# Patient Record
Sex: MALE | Race: WHITE | NOT HISPANIC OR LATINO | Employment: FULL TIME | URBAN - METROPOLITAN AREA
[De-identification: names, ages, dates, MRNs, and addresses within clinical notes are randomized per-mention and may not be internally consistent; named-entity substitution may affect disease eponyms.]

---

## 2018-05-28 ENCOUNTER — HOSPITAL ENCOUNTER (EMERGENCY)
Facility: HOSPITAL | Age: 33
Discharge: HOME | End: 2018-05-28
Attending: EMERGENCY MEDICINE | Admitting: EMERGENCY MEDICINE
Payer: COMMERCIAL

## 2018-05-28 VITALS
SYSTOLIC BLOOD PRESSURE: 119 MMHG | WEIGHT: 185 LBS | TEMPERATURE: 97.9 F | BODY MASS INDEX: 23 KG/M2 | DIASTOLIC BLOOD PRESSURE: 59 MMHG | RESPIRATION RATE: 18 BRPM | OXYGEN SATURATION: 99 % | HEIGHT: 75 IN | HEART RATE: 114 BPM

## 2018-05-28 DIAGNOSIS — R44.0 AUDITORY HALLUCINATIONS: Primary | ICD-10-CM

## 2018-05-28 PROCEDURE — 99284 EMERGENCY DEPT VISIT MOD MDM: CPT

## 2018-05-28 NOTE — ED NOTES
Pt changed into paper scrubs per protocol  Pt's belongings inventoried and placed in bags in locker number 2  Pt given blankets for comfort measures  Pt calm and cooperative  Constant watch observation initiated       Marielena Jackson  05/28/18 3808

## 2018-05-28 NOTE — ED PROVIDER NOTES
History  Chief Complaint   Patient presents with    Psychiatric Evaluation     Patient states"he wants to voluntarily admit himself"  Patient states"he is having auditory hallucinations"  History provided by:  Patient  Psychiatric Evaluation   Presenting symptoms: hallucinations    Presenting symptoms: no aggressive behavior, no agitation, no delusions, no depression, no disorganized speech, no disorganized thought process, no self-mutilation, no suicidal thoughts, no suicidal threats and no suicide attempt    Patient accompanied by: family member  Degree of incapacity (severity):  Mild  Onset quality:  Gradual  Duration:  10 weeks  Timing:  Constant  Progression:  Waxing and waning  Chronicity:  New  Context: drug abuse (prior methamphetamine use, just got out of [facility] for 40 days and has not used)    Context: not alcohol use, not noncompliant and not recent medication change    Treatment compliance:  Untreated  Relieved by:  Nothing  Worsened by:  Drugs  Ineffective treatments:  None tried  Associated symptoms: irritability    Associated symptoms: no abdominal pain, no anxiety, no appetite change, no chest pain, not distractible, no euphoric mood and no fatigue    Risk factors: no family hx of mental illness, no hx of mental illness, no hx of suicide attempts and no recent psychiatric admission        None       Past Medical History:   Diagnosis Date    Drug abuse     ETOH abuse        History reviewed  No pertinent surgical history  History reviewed  No pertinent family history  I have reviewed and agree with the history as documented  Social History   Substance Use Topics    Smoking status: Current Every Day Smoker    Smokeless tobacco: Current User    Alcohol use Yes        Review of Systems   Constitutional: Positive for irritability  Negative for appetite change and fatigue  Cardiovascular: Negative for chest pain  Gastrointestinal: Negative for abdominal pain  Psychiatric/Behavioral: Positive for hallucinations  Negative for agitation, self-injury and suicidal ideas  The patient is not nervous/anxious  All other systems reviewed and are negative  Physical Exam  Physical Exam   Constitutional: He appears well-developed and well-nourished  No distress  HENT:   Head: Normocephalic  Eyes: EOM are normal  Pupils are equal, round, and reactive to light  Neck: Neck supple  Cardiovascular: Normal rate and regular rhythm  Pulmonary/Chest: Effort normal and breath sounds normal    Abdominal: Soft  Bowel sounds are normal    Musculoskeletal: Normal range of motion  He exhibits no edema  Neurological: He is alert  No cranial nerve deficit  He exhibits normal muscle tone  Skin: Skin is warm  Capillary refill takes less than 2 seconds  He is not diaphoretic  Psychiatric: His mood appears not anxious  His speech is not slurred  He is not agitated and not actively hallucinating (no active hallucinations at present)  He does not express impulsivity or inappropriate judgment  He does not exhibit a depressed mood  He expresses no homicidal and no suicidal ideation  He expresses no suicidal plans and no homicidal plans  Vitals reviewed        Vital Signs  ED Triage Vitals   Temperature Pulse Respirations Blood Pressure SpO2   05/28/18 1815 05/28/18 1815 05/28/18 1815 05/28/18 1815 05/28/18 1815   97 9 °F (36 6 °C) 99 20 118/71 99 %      Temp Source Heart Rate Source Patient Position - Orthostatic VS BP Location FiO2 (%)   05/28/18 1815 05/28/18 1815 05/28/18 1815 05/28/18 1815 --   Oral Monitor Sitting Left arm       Pain Score       05/28/18 1830       No Pain           Vitals:    05/28/18 1815 05/28/18 1830   BP: 118/71 119/59   Pulse: 99 (!) 114   Patient Position - Orthostatic VS: Sitting Sitting       Visual Acuity      ED Medications  Medications - No data to display    Diagnostic Studies  Results Reviewed     None                 No orders to display Procedures  Procedures       Phone Contacts  ED Phone Contact    ED Course                               MDM  Number of Diagnoses or Management Options  Auditory hallucinations: new and requires workup     Amount and/or Complexity of Data Reviewed  Discuss the patient with other providers: yes (C/w crisis)      CritCare Time    Disposition  Final diagnoses: Auditory hallucinations     Time reflects when diagnosis was documented in both MDM as applicable and the Disposition within this note     Time User Action Codes Description Comment    5/28/2018  8:23 PM Maria Esther MALLOY Add [R44 0] Auditory hallucinations       ED Disposition     ED Disposition Condition Comment    Discharge  Bhavani Jena discharge to home/self care  Condition at discharge: Good        MD Documentation      Most Recent Value   Sending MD Dr Melinda Lockwood up With Specialties Details Why Contact Info Additional Information    [address] [facility] Emergency Department Emergency Medicine   [address] [facility] [address] [facility] ED, [address] [place] [address], [id number]          Patient's Medications    No medications on file     No discharge procedures on file      ED Provider  Electronically Signed by           Osman Keller MD  05/28/18 5003

## 2018-05-28 NOTE — ED NOTES
Patients belongings:   [address] [facility]  Following items sent to security per request:  $[phone]  [address] [place] [address] with ID  Notebook  2x credit cards         Marielena Jackson  05/28/18 3212

## 2018-05-28 NOTE — ED NOTES
Patient is seated in bed and appears to be talking to friend  Patient does not appear to be showing any signs of distress  Will continue to monitor       Malou Sheets  05/28/18 1958

## 2018-05-29 NOTE — DISCHARGE INSTRUCTIONS
Hallucinations   WHAT YOU NEED TO KNOW:   Hallucinations are things you see, hear, feel, taste, or smell that seem real but are not  Some hallucinations are temporary  Hallucinations that continue, interfere with daily activities, or worsen may be a sign of a serious medical or mental condition that needs treatment  DISCHARGE INSTRUCTIONS:   Call 911 if you or someone else notices any of the following:   · You want to harm yourself or someone else  · You hear voices telling you to harm yourself or someone else  · You have a seizure  · You are confused, do not know where you are, or are not making sense when you speak  Return to the emergency department if:   · Your hallucinations get worse  · You vomit several times in a row  · Your heartbeat or breathing is faster or slower than usual      · You have trouble breathing or shortness of breath  Contact your healthcare provider if:   · You have new hallucinations  · You have questions or concerns about your condition or care  Medicines:   · Medicines  may be given to stop the hallucinations, reduce anxiety, or relax your muscles  · Take your medicine as directed  Contact your healthcare provider if you think your medicine is not helping or if you have side effects  Tell him or her if you are allergic to any medicine  Keep a list of the medicines, vitamins, and herbs you take  Include the amounts, and when and why you take them  Bring the list or the pill bottles to follow-up visits  Carry your medicine list with you in case of an emergency  Follow up with your healthcare provider as directed:  Write down your questions so you remember to ask them during your visits  © 2017 [facility] Information is for End User's use only and may not be sold, redistributed or otherwise used for commercial purposes   All illustrations and images included in CareNotes® are the copyrighted property of A D A M , Inc  or [employer]  The above information is an  only  It is not intended as medical advice for individual conditions or treatments  Talk to your doctor, nurse or pharmacist before following any medical regimen to see if it is safe and effective for you
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
## 2018-05-31 LAB
ALBUMIN SERPL BCP-MCNC: 4.1 G/DL (ref 3.5–5.7)
ALP SERPL-CCNC: 72 IU/L (ref 40–150)
ALT SERPL W P-5'-P-CCNC: 17 IU/L (ref 0–50)
AMPHETAMINES (HISTORICAL): NEGATIVE
ANION GAP SERPL CALCULATED.3IONS-SCNC: 11.5 MM/L
AST SERPL W P-5'-P-CCNC: 20 U/L (ref 8–27)
BARBITURATES (HISTORICAL): NEGATIVE
BASOPHILS # BLD AUTO: 0 X3/UL (ref 0–0.3)
BASOPHILS # BLD AUTO: 0.6 % (ref 0–2)
BENZODIAZEPINES (HISTORICAL): NEGATIVE
BILIRUB SERPL-MCNC: 0.7 MG/DL (ref 0.3–1)
BILIRUB UR QL STRIP: NEGATIVE
BUN SERPL-MCNC: 13 MG/DL (ref 7–25)
CALCIUM SERPL-MCNC: 9.2 MG/DL (ref 8.6–10.5)
CANNABINOIDS (HISTORICAL): NEGATIVE
CHLORIDE SERPL-SCNC: 105 MM/L (ref 98–107)
CLARITY UR: CLEAR
CO2 SERPL-SCNC: 27 MM/L (ref 21–31)
COCAINE (HISTORICAL): NEGATIVE
COLOR UR: YELLOW
CREAT SERPL-MCNC: 0.91 MG/DL (ref 0.7–1.3)
DEPRECATED RDW RBC AUTO: 12.4 % (ref 11.5–14.5)
EGFR (HISTORICAL): > 60 GFR
EGFR AFRICAN AMERICAN (HISTORICAL): > 60 GFR
EOSINOPHIL # BLD AUTO: 0.1 X3/UL (ref 0–0.5)
EOSINOPHIL NFR BLD AUTO: 2.3 % (ref 0–5)
ETHANOL (HISTORICAL): < 10 MG/DL
GLUCOSE (HISTORICAL): 112 MG/DL (ref 65–99)
GLUCOSE UR STRIP-MCNC: NEGATIVE MG/DL
HCT VFR BLD AUTO: 40.7 % (ref 42–52)
HGB BLD-MCNC: 14.1 G/DL (ref 14–18)
HGB UR QL STRIP.AUTO: NEGATIVE
KETONES UR STRIP-MCNC: NEGATIVE MG/DL
LEUKOCYTE ESTERASE UR QL STRIP: NEGATIVE
LYMPHOCYTES # BLD AUTO: 2.3 X3/UL (ref 1.2–4.2)
LYMPHOCYTES NFR BLD AUTO: 37.5 % (ref 20.5–51.1)
MCH RBC QN AUTO: 30.4 PG (ref 26–34)
MCHC RBC AUTO-ENTMCNC: 34.7 G/DL (ref 31–36)
MCV RBC AUTO: 87.8 FL (ref 81–99)
MEDICAL ALCOHOL (HISTORICAL): 0 %
METHADONE (HISTORICAL): NEGATIVE
MONOCYTES # BLD AUTO: 0.5 X3/UL (ref 0–1)
MONOCYTES NFR BLD AUTO: 7.9 % (ref 1.7–12)
NEUTROPHILS # BLD AUTO: 3.1 X3/UL (ref 1.4–6.5)
NEUTS SEG NFR BLD AUTO: 51.7 % (ref 42.2–75.2)
NITRITE UR QL STRIP: NEGATIVE
OPIATES (HISTORICAL): NEGATIVE
OSMOLALITY, SERUM (HISTORICAL): 280 MOSM (ref 262–291)
OXYCODONE (HISTORICAL): NEGATIVE
PH UR STRIP.AUTO: 6.5 [PH] (ref 4.5–8)
PHENCYCLIDINE URINE (HISTORICAL): NEGATIVE
PLATELET # BLD AUTO: 221 X3/UL (ref 130–400)
PLEASE NOTE (HISTORICAL): NORMAL
PMV BLD AUTO: 8.2 FL (ref 8.6–11.7)
POTASSIUM SERPL-SCNC: 3.5 MM/L (ref 3.5–5.5)
PROPOXYPHENE (HISTORICAL): NEGATIVE
PROT UR STRIP-MCNC: NEGATIVE MG/DL
RBC # BLD AUTO: 4.64 X6/UL (ref 4.3–5.9)
SODIUM SERPL-SCNC: 140 MM/L (ref 134–143)
SP GR UR STRIP.AUTO: 1.02 (ref 1–1.03)
TOTAL PROTEIN (HISTORICAL): 6.2 G/DL (ref 6.4–8.9)
TSH SERPL DL<=0.05 MIU/L-ACNC: 36.63 UIU/M (ref 0.45–5.33)
UROBILINOGEN UR QL STRIP.AUTO: 0.2 EU/DL (ref 0.2–8)
WBC # BLD AUTO: 6 X3/UL (ref 4.8–10.8)

## 2018-05-31 PROCEDURE — 36415 COLL VENOUS BLD VENIPUNCTURE: CPT

## 2018-05-31 PROCEDURE — 9990 DRUG SCREEN MULTIPLE CLASS (71121123)

## 2018-05-31 PROCEDURE — 9990 ASSAY OF ETHANOL (71120794)

## 2018-05-31 PROCEDURE — 99285 EMERGENCY DEPT VISIT HI MDM: CPT

## 2018-05-31 PROCEDURE — 80053 COMPREHEN METABOLIC PANEL: CPT

## 2018-05-31 PROCEDURE — 80307 DRUG TEST PRSMV CHEM ANLYZR: CPT

## 2018-05-31 PROCEDURE — 9990 VENIPUNCTURE-ER (9010166)

## 2018-05-31 PROCEDURE — 9990 VENIPUNCTURE (14170013)

## 2018-05-31 PROCEDURE — 9990 CBC AUTO & AUTO DIFF (71200000)

## 2018-05-31 PROCEDURE — 81003 URINALYSIS AUTO W/O SCOPE: CPT

## 2018-05-31 PROCEDURE — 9990 URINALYSIS AUTO W/O MICRO (71400121)

## 2018-05-31 PROCEDURE — 9990 BACTERIAL CULTURE OTHER SOURCE (71700058)

## 2018-05-31 PROCEDURE — 85025 COMPLETE CBC W/AUTO DIFF WBC: CPT

## 2018-05-31 PROCEDURE — 9990 TSH (71100234)

## 2018-05-31 PROCEDURE — 87070 CULTURE OTHR SPECIMN AEROBIC: CPT

## 2018-05-31 PROCEDURE — 9990 EM LEVEL V (9020181)

## 2018-05-31 PROCEDURE — 9990 COMPREHEN METABOLIC PANEL (71120349)

## 2018-05-31 PROCEDURE — 84443 ASSAY THYROID STIM HORMONE: CPT

## 2018-06-01 ENCOUNTER — HOSPITAL ENCOUNTER (INPATIENT)
Dept: OTHER | Facility: HOSPITAL | Age: 33
LOS: 68 days | Discharge: HOME | DRG: 750 | End: 2018-08-08
Attending: PSYCHIATRY & NEUROLOGY | Admitting: PSYCHIATRY & NEUROLOGY
Payer: COMMERCIAL

## 2018-06-01 PROCEDURE — 9990 ADULT BEHAVIOR UNIT GEN SUPPLY (7900053)

## 2018-06-01 PROCEDURE — 9990

## 2018-06-01 PROCEDURE — 9990 ADULT BEHAVOR UNIT (100305)

## 2018-06-01 PROCEDURE — 9990 NICOTINE 21 MG/24H (27022359)

## 2018-06-02 LAB — TSH SERPL DL<=0.05 MIU/L-ACNC: 12.84 UIU/M (ref 0.45–5.33)

## 2018-06-02 PROCEDURE — 9990 VENIPUNCTURE (14170013)

## 2018-06-02 PROCEDURE — 9990 TSH (71100234)

## 2018-06-02 PROCEDURE — 9990 ADULT BEHAVIOR UNIT GEN SUPPLY (7900053)

## 2018-06-02 PROCEDURE — 9990 NICOTINE 21 MG/24H (27022359)

## 2018-06-02 PROCEDURE — 9990 ADULT BEHAVOR UNIT (100305)

## 2018-06-02 PROCEDURE — 36415 COLL VENOUS BLD VENIPUNCTURE: CPT

## 2018-06-02 PROCEDURE — 84443 ASSAY THYROID STIM HORMONE: CPT

## 2018-06-02 PROCEDURE — 9990

## 2018-06-03 PROCEDURE — 9990 ADULT BEHAVOR UNIT (100305)

## 2018-06-03 PROCEDURE — 9990 ADULT BEHAVIOR UNIT GEN SUPPLY (7900053)

## 2018-06-03 PROCEDURE — 9990 NICOTINE 21 MG/24H (27022359)

## 2018-06-03 PROCEDURE — 9990

## 2018-06-04 PROCEDURE — 9990 ADULT BEHAVIOR UNIT GEN SUPPLY (7900053)

## 2018-06-04 PROCEDURE — 9990 NICOTINE 21 MG/24H (27022359)

## 2018-06-04 PROCEDURE — 9990

## 2018-06-04 PROCEDURE — 9990 ADULT BEHAVOR UNIT (100305)

## 2018-06-05 PROCEDURE — 9990 NICOTINE 21 MG/24H (27022359)

## 2018-06-05 PROCEDURE — 9990 ADULT BEHAVIOR UNIT GEN SUPPLY (7900053)

## 2018-06-05 PROCEDURE — 9990

## 2018-06-05 PROCEDURE — 9990 ADULT BEHAVOR UNIT (100305)

## 2018-06-06 PROCEDURE — 9990

## 2018-06-06 PROCEDURE — 9990 ADULT BEHAVIOR UNIT GEN SUPPLY (7900053)

## 2018-06-06 PROCEDURE — 9990 HALOPERIDOL TAB 5 MG (27102102)

## 2018-06-06 PROCEDURE — 9990 NICOTINE 21 MG/24H (27022359)

## 2018-06-06 PROCEDURE — 9990 ADULT BEHAVOR UNIT (100305)

## 2018-06-06 PROCEDURE — 9990 LORAZEPAM TAB 1 MG (27102144)

## 2018-06-07 PROCEDURE — 9990 NICOTINE 21 MG/24H (27022359)

## 2018-06-07 PROCEDURE — 9990

## 2018-06-07 PROCEDURE — 9990 ADULT BEHAVOR UNIT (100305)

## 2018-06-07 PROCEDURE — 9990 ADULT BEHAVIOR UNIT GEN SUPPLY (7900053)

## 2018-06-08 PROCEDURE — 9990

## 2018-06-08 PROCEDURE — 9990 NICOTINE 21 MG/24H (27022359)

## 2018-06-08 PROCEDURE — 9990 ADULT BEHAVOR UNIT (100305)

## 2018-06-08 PROCEDURE — 9990 ADULT BEHAVIOR UNIT GEN SUPPLY (7900053)

## 2018-06-09 PROCEDURE — 9990

## 2018-06-09 PROCEDURE — 9990 ADULT BEHAVIOR UNIT GEN SUPPLY (7900053)

## 2018-06-09 PROCEDURE — 9990 ADULT BEHAVOR UNIT (100305)

## 2018-06-09 PROCEDURE — 9990 NICOTINE 21 MG/24H (27022359)

## 2018-06-10 PROCEDURE — 9990

## 2018-06-10 PROCEDURE — 9990 NICOTINE 21 MG/24H (27022359)

## 2018-06-10 PROCEDURE — 9990 ADULT BEHAVOR UNIT (100305)

## 2018-06-10 PROCEDURE — 9990 ADULT BEHAVIOR UNIT GEN SUPPLY (7900053)

## 2018-06-11 PROCEDURE — 9990 ADULT BEHAVOR UNIT (100305)

## 2018-06-11 PROCEDURE — 9990

## 2018-06-11 PROCEDURE — 9990 NICOTINE 21 MG/24H (27022359)

## 2018-06-11 PROCEDURE — 9990 ADULT BEHAVIOR UNIT GEN SUPPLY (7900053)

## 2018-06-12 PROCEDURE — 9990 HALOPERIDOL TAB 5 MG (27102102)

## 2018-06-12 PROCEDURE — 9990 BENZTROPINE 1 MG (27101039)

## 2018-06-12 PROCEDURE — 9990 ADULT BEHAVOR UNIT (100305)

## 2018-06-12 PROCEDURE — 9990 ADULT BEHAVIOR UNIT GEN SUPPLY (7900053)

## 2018-06-12 PROCEDURE — 9990

## 2018-06-12 PROCEDURE — 9990 NICOTINE 21 MG/24H (27022359)

## 2018-06-13 PROCEDURE — 9990 ADULT BEHAVIOR UNIT GEN SUPPLY (7900053)

## 2018-06-13 PROCEDURE — 9990 NICOTINE 21 MG/24H (27022359)

## 2018-06-13 PROCEDURE — 9990 ADULT BEHAVOR UNIT (100305)

## 2018-06-13 PROCEDURE — 9990 BENZTROPINE 1 MG (27101039)

## 2018-06-13 PROCEDURE — 9990 HALOPERIDOL TAB 5 MG (27102102)

## 2018-06-14 PROCEDURE — 9990 ADULT BEHAVIOR UNIT GEN SUPPLY (7900053)

## 2018-06-14 PROCEDURE — 9990 ADULT BEHAVOR UNIT (100305)

## 2018-06-14 PROCEDURE — 9990 BENZTROPINE 1 MG (27101039)

## 2018-06-14 PROCEDURE — 9990 NICOTINE 21 MG/24H (27022359)

## 2018-06-14 PROCEDURE — 9990 HALOPERIDOL TAB 5 MG (27102102)

## 2018-06-15 PROCEDURE — 9990 ADULT BEHAVIOR UNIT GEN SUPPLY (7900053)

## 2018-06-15 PROCEDURE — 9990 ADULT BEHAVOR UNIT (100305)

## 2018-06-15 PROCEDURE — 9990 BENZTROPINE 1 MG (27101039)

## 2018-06-15 PROCEDURE — 9990 NICOTINE 21 MG/24H (27022359)

## 2018-06-15 PROCEDURE — 9990 HALOPERIDOL TAB 5 MG (27102102)

## 2018-06-16 PROCEDURE — 9990 HALOPERIDOL TAB 5 MG (27102102)

## 2018-06-16 PROCEDURE — 9990 NICOTINE 21 MG/24H (27022359)

## 2018-06-16 PROCEDURE — 9990 ADULT BEHAVIOR UNIT GEN SUPPLY (7900053)

## 2018-06-16 PROCEDURE — 9990 ADULT BEHAVOR UNIT (100305)

## 2018-06-16 PROCEDURE — 9990

## 2018-06-16 PROCEDURE — 9990 BENZTROPINE 1 MG (27101039)

## 2018-06-17 PROCEDURE — 9990 NICOTINE 21 MG/24H (27022359)

## 2018-06-17 PROCEDURE — 9990 BENZTROPINE 1 MG (27101039)

## 2018-06-17 PROCEDURE — 9990

## 2018-06-17 PROCEDURE — 9990 ADULT BEHAVOR UNIT (100305)

## 2018-06-17 PROCEDURE — 9990 HALOPERIDOL TAB 5 MG (27102102)

## 2018-06-17 PROCEDURE — 9990 ADULT BEHAVIOR UNIT GEN SUPPLY (7900053)

## 2018-06-18 DIAGNOSIS — F25.0 SCHIZOAFFECTIVE DISORDER, BIPOLAR TYPE (HCC): Chronic | ICD-10-CM

## 2018-06-18 DIAGNOSIS — E03.9 HYPOTHYROIDISM, UNSPECIFIED TYPE: Primary | ICD-10-CM

## 2018-06-18 LAB — TSH SERPL DL<=0.05 MIU/L-ACNC: 36.55 UIU/M (ref 0.45–5.33)

## 2018-06-18 PROCEDURE — 84443 ASSAY THYROID STIM HORMONE: CPT

## 2018-06-18 PROCEDURE — 9990 NICOTINE 21 MG/24H (27022359)

## 2018-06-18 PROCEDURE — 80048 BASIC METABOLIC PNL TOTAL CA: CPT

## 2018-06-18 PROCEDURE — 9990 TSH (71100234)

## 2018-06-18 PROCEDURE — 9990 VENIPUNCTURE (14170013)

## 2018-06-18 PROCEDURE — 80178 ASSAY OF LITHIUM: CPT

## 2018-06-18 PROCEDURE — 36415 COLL VENOUS BLD VENIPUNCTURE: CPT

## 2018-06-18 PROCEDURE — 9990 METABOLIC PANEL TOTAL CA (71120323)

## 2018-06-18 PROCEDURE — 9990

## 2018-06-18 PROCEDURE — 9990 HALOPERIDOL TAB 5 MG (27102102)

## 2018-06-18 PROCEDURE — 9990 ADULT BEHAVIOR UNIT GEN SUPPLY (7900053)

## 2018-06-18 PROCEDURE — 9990 BENZTROPINE 1 MG (27101039)

## 2018-06-18 PROCEDURE — 9990 CBC AUTO & AUTO DIFF (71200000)

## 2018-06-18 PROCEDURE — 9990 ADULT BEHAVOR UNIT (100305)

## 2018-06-18 PROCEDURE — 85025 COMPLETE CBC W/AUTO DIFF WBC: CPT

## 2018-06-19 LAB — COMMENT (HISTORICAL): NORMAL

## 2018-06-19 PROCEDURE — 9990 HALOPERIDOL TAB 5 MG (27102102)

## 2018-06-19 PROCEDURE — 9990 ADULT BEHAVIOR UNIT GEN SUPPLY (7900053)

## 2018-06-19 PROCEDURE — 9990 BENZTROPINE 1 MG (27101039)

## 2018-06-19 PROCEDURE — 9990

## 2018-06-19 PROCEDURE — 9990 NICOTINE 21 MG/24H (27022359)

## 2018-06-19 PROCEDURE — 9990 ADULT BEHAVOR UNIT (100305)

## 2018-06-19 PROCEDURE — 9990 OLANZAPINE 5 MG (27112317)

## 2018-06-20 PROCEDURE — 9990 BENZTROPINE 1 MG (27101039)

## 2018-06-20 PROCEDURE — 9990

## 2018-06-20 PROCEDURE — 9990 ADULT BEHAVIOR UNIT GEN SUPPLY (7900053)

## 2018-06-20 PROCEDURE — 9990 ADULT BEHAVOR UNIT (100305)

## 2018-06-20 PROCEDURE — 9990 OLANZAPINE 5 MG (27112317)

## 2018-06-20 PROCEDURE — 9990 NICOTINE 21 MG/24H (27022359)

## 2018-06-21 PROCEDURE — 9990

## 2018-06-21 PROCEDURE — 9990 HALOPERIDOL TAB 5 MG (27102102)

## 2018-06-21 PROCEDURE — 9990 ADULT BEHAVOR UNIT (100305)

## 2018-06-21 PROCEDURE — 80156 ASSAY CARBAMAZEPINE TOTAL: CPT

## 2018-06-21 PROCEDURE — 9990 BENZTROPINE 1 MG (27101039)

## 2018-06-21 PROCEDURE — 9990 ADULT BEHAVIOR UNIT GEN SUPPLY (7900053)

## 2018-06-21 PROCEDURE — 9990 NICOTINE 21 MG/24H (27022359)

## 2018-06-21 PROCEDURE — 82248 BILIRUBIN DIRECT: CPT

## 2018-06-21 PROCEDURE — 80053 COMPREHEN METABOLIC PANEL: CPT

## 2018-06-21 PROCEDURE — 9990 OLANZAPINE 5 MG (27112317)

## 2018-06-21 PROCEDURE — 9990 COMPREHEN METABOLIC PANEL (71120349)

## 2018-06-22 LAB
ALBUMIN SERPL BCP-MCNC: 4.3 G/DL (ref 3.5–5.7)
ALP SERPL-CCNC: 60 IU/L (ref 40–150)
ALT SERPL W P-5'-P-CCNC: 21 IU/L (ref 0–50)
ANION GAP SERPL CALCULATED.3IONS-SCNC: 14.2 MM/L
AST SERPL W P-5'-P-CCNC: 14 U/L (ref 8–27)
BILIRUB SERPL-MCNC: 0.4 MG/DL (ref 0.3–1)
BILIRUBIN DIRECT (HISTORICAL): 0 MG/DL (ref 0–0.2)
BUN SERPL-MCNC: 23 MG/DL (ref 7–25)
CALCIUM SERPL-MCNC: 9.7 MG/DL (ref 8.6–10.5)
CARBAMAZEPINE LEVEL (HISTORICAL): 2.4 MCQ/M (ref 4–12)
CHLORIDE SERPL-SCNC: 101 MM/L (ref 98–107)
CO2 SERPL-SCNC: 25 MM/L (ref 21–31)
CREAT SERPL-MCNC: 0.97 MG/DL (ref 0.7–1.3)
EGFR (HISTORICAL): > 60 GFR
EGFR AFRICAN AMERICAN (HISTORICAL): > 60 GFR
GLUCOSE (HISTORICAL): 92 MG/DL (ref 65–99)
OSMOLALITY, SERUM (HISTORICAL): 275 MOSM (ref 262–291)
POTASSIUM SERPL-SCNC: 4.2 MM/L (ref 3.5–5.5)
SODIUM SERPL-SCNC: 136 MM/L (ref 134–143)
TOTAL PROTEIN (HISTORICAL): 6.5 G/DL (ref 6.4–8.9)

## 2018-06-22 PROCEDURE — 9990 VENIPUNCTURE (14170013)

## 2018-06-22 PROCEDURE — 9990 ADULT BEHAVIOR UNIT GEN SUPPLY (7900053)

## 2018-06-22 PROCEDURE — 36415 COLL VENOUS BLD VENIPUNCTURE: CPT

## 2018-06-22 PROCEDURE — 9990 NICOTINE 21 MG/24H (27022359)

## 2018-06-22 PROCEDURE — 9990

## 2018-06-22 PROCEDURE — 9990 ADULT BEHAVOR UNIT (100305)

## 2018-06-22 PROCEDURE — 9990 OLANZAPINE 10 MG (27112341)

## 2018-06-22 PROCEDURE — 9990 BENZTROPINE 1 MG (27101039)

## 2018-06-22 RX ORDER — DIPHENHYDRAMINE HYDROCHLORIDE 50 MG/ML
50 INJECTION INTRAMUSCULAR; INTRAVENOUS EVERY 4 HOURS PRN
Status: DISCONTINUED | OUTPATIENT
Start: 2018-06-23 | End: 2018-08-08 | Stop reason: HOSPADM

## 2018-06-22 RX ORDER — OLANZAPINE 5 MG/1
5 TABLET ORAL DAILY
Status: DISCONTINUED | OUTPATIENT
Start: 2018-06-23 | End: 2018-06-22

## 2018-06-22 RX ORDER — ACETAMINOPHEN 325 MG/1
650 TABLET ORAL EVERY 4 HOURS PRN
Status: DISCONTINUED | OUTPATIENT
Start: 2018-06-23 | End: 2018-08-08 | Stop reason: HOSPADM

## 2018-06-22 RX ORDER — CHLORPROMAZINE HYDROCHLORIDE 25 MG/ML
25 INJECTION INTRAMUSCULAR EVERY 6 HOURS PRN
Status: DISCONTINUED | OUTPATIENT
Start: 2018-06-23 | End: 2018-08-08 | Stop reason: HOSPADM

## 2018-06-22 RX ORDER — DIPHENHYDRAMINE HCL 25 MG
50 TABLET ORAL EVERY 4 HOURS PRN
Status: DISCONTINUED | OUTPATIENT
Start: 2018-06-23 | End: 2018-08-08 | Stop reason: HOSPADM

## 2018-06-22 RX ORDER — CHLORPROMAZINE HYDROCHLORIDE 25 MG/1
25 TABLET, FILM COATED ORAL EVERY 6 HOURS PRN
Status: DISCONTINUED | OUTPATIENT
Start: 2018-06-23 | End: 2018-08-08 | Stop reason: HOSPADM

## 2018-06-22 RX ORDER — PALIPERIDONE 3 MG/1
3 TABLET, EXTENDED RELEASE ORAL DAILY
Status: DISCONTINUED | OUTPATIENT
Start: 2018-06-23 | End: 2018-06-26

## 2018-06-22 RX ORDER — CARBAMAZEPINE 200 MG/1
200 TABLET ORAL 2 TIMES DAILY
Status: DISCONTINUED | OUTPATIENT
Start: 2018-06-23 | End: 2018-07-01

## 2018-06-22 RX ORDER — TRAZODONE HYDROCHLORIDE 50 MG/1
50 TABLET ORAL
Status: DISCONTINUED | OUTPATIENT
Start: 2018-06-23 | End: 2018-08-08 | Stop reason: HOSPADM

## 2018-06-22 RX ORDER — MAGNESIUM HYDROXIDE/ALUMINUM HYDROXICE/SIMETHICONE 120; 1200; 1200 MG/30ML; MG/30ML; MG/30ML
30 SUSPENSION ORAL EVERY 4 HOURS PRN
Status: DISCONTINUED | OUTPATIENT
Start: 2018-06-23 | End: 2018-08-08 | Stop reason: HOSPADM

## 2018-06-22 RX ORDER — BENZTROPINE MESYLATE 1 MG/1
1 TABLET ORAL 2 TIMES DAILY
Status: DISCONTINUED | OUTPATIENT
Start: 2018-06-23 | End: 2018-08-08 | Stop reason: HOSPADM

## 2018-06-22 RX ORDER — OLANZAPINE 10 MG/1
10 TABLET ORAL DAILY
Status: DISCONTINUED | OUTPATIENT
Start: 2018-06-23 | End: 2018-06-22

## 2018-06-22 RX ORDER — NICOTINE 21 MG/24HR
21 PATCH, TRANSDERMAL 24 HOURS TRANSDERMAL DAILY
Status: DISCONTINUED | OUTPATIENT
Start: 2018-06-23 | End: 2018-08-08 | Stop reason: HOSPADM

## 2018-06-22 RX ORDER — LEVOTHYROXINE SODIUM 0.03 MG/1
50 TABLET ORAL
Status: DISCONTINUED | OUTPATIENT
Start: 2018-06-23 | End: 2018-08-08 | Stop reason: HOSPADM

## 2018-06-23 RX ADMIN — CARBAMAZEPINE 200 MG: 200 TABLET ORAL at 21:43

## 2018-06-23 RX ADMIN — CHLORPROMAZINE HYDROCHLORIDE 25 MG: 25 TABLET, SUGAR COATED ORAL at 21:42

## 2018-06-23 RX ADMIN — BENZTROPINE MESYLATE 1 MG: 1 TABLET ORAL at 09:26

## 2018-06-23 RX ADMIN — PALIPERIDONE 3 MG: 3 TABLET, EXTENDED RELEASE ORAL at 09:27

## 2018-06-23 RX ADMIN — BENZTROPINE MESYLATE 1 MG: 1 TABLET ORAL at 21:42

## 2018-06-23 RX ADMIN — CARBAMAZEPINE 200 MG: 200 TABLET ORAL at 09:27

## 2018-06-23 RX ADMIN — LEVOTHYROXINE SODIUM 50 MCG: 25 TABLET ORAL at 05:55

## 2018-06-23 NOTE — NURSING NOTE
Pt  Has been isolated to room for most of day  Compliant with meals and meds  Continues to have auditory hallucinations but has not acted on them  Denies SI, HI  Minimally social with peers  Cooperative

## 2018-06-24 PROBLEM — F39 UNSPECIFIED MOOD (AFFECTIVE) DISORDER (HCC): Status: ACTIVE | Noted: 2018-06-24

## 2018-06-24 PROBLEM — F15.10 AMPHETAMINE ABUSE (HCC): Status: ACTIVE | Noted: 2018-06-24

## 2018-06-24 PROBLEM — F15.951 AMPHETAMINE AND PSYCHOSTIMULANT-INDUCED PSYCHOSIS WITH HALLUCINATIONS (HCC): Status: ACTIVE | Noted: 2018-06-24

## 2018-06-24 RX ORDER — PALIPERIDONE 3 MG/1
3 TABLET, EXTENDED RELEASE ORAL DAILY
Status: COMPLETED | OUTPATIENT
Start: 2018-06-24 | End: 2018-06-24

## 2018-06-24 RX ADMIN — CARBAMAZEPINE 200 MG: 200 TABLET ORAL at 10:29

## 2018-06-24 RX ADMIN — CARBAMAZEPINE 200 MG: 200 TABLET ORAL at 21:58

## 2018-06-24 RX ADMIN — LEVOTHYROXINE SODIUM 50 MCG: 25 TABLET ORAL at 06:03

## 2018-06-24 RX ADMIN — NICOTINE 21 MG: 21 PATCH, EXTENDED RELEASE TRANSDERMAL at 10:31

## 2018-06-24 RX ADMIN — DIPHENHYDRAMINE HCL 50 MG: 25 TABLET ORAL at 13:48

## 2018-06-24 RX ADMIN — PALIPERIDONE 3 MG: 3 TABLET, EXTENDED RELEASE ORAL at 14:49

## 2018-06-24 RX ADMIN — BENZTROPINE MESYLATE 1 MG: 1 TABLET ORAL at 10:29

## 2018-06-24 RX ADMIN — PALIPERIDONE 3 MG: 3 TABLET, EXTENDED RELEASE ORAL at 10:29

## 2018-06-24 RX ADMIN — CHLORPROMAZINE HYDROCHLORIDE 25 MG: 25 TABLET, SUGAR COATED ORAL at 21:59

## 2018-06-24 RX ADMIN — BENZTROPINE MESYLATE 1 MG: 1 TABLET ORAL at 21:59

## 2018-06-24 RX ADMIN — ACETAMINOPHEN 650 MG: 325 TABLET ORAL at 13:47

## 2018-06-24 NOTE — PLAN OF CARE
Alteration in Thoughts and Perception     Treatment Goal: Gain control of psychotic behaviors/thinking, reduce/eliminate presenting symptoms and demonstrate improved reality functioning upon discharge Progressing     Verbalize thoughts and feelings Progressing     Refrain from acting on delusional thinking/internal stimuli Progressing     Agree to be compliant with medication regime, as prescribed and report medication side effects Progressing     Attend and participate in unit activities, including therapeutic, recreational, and educational groups Progressing     Recognize dysfunctional thoughts, communicate reality-based thoughts at the time of discharge Progressing     Complete daily ADLs, including personal hygiene independently, as able Progressing        Anxiety     Anxiety is at manageable level Progressing        Individualized Interventions     Patient will verbalize need for hospitalization and will no longer attempt elopement within 5 days Progressing     Patient will recognize inappropriate behaviors and develop alternative behaviors within 5 days Progressing        Risk for Self Injury/Neglect     Treatment Goal: Remain safe during length of stay, learn and adopt new coping skills, and be free of self-injurious ideation, impulses and acts at the time of discharge Progressing     Verbalize thoughts and feelings Progressing     Refrain from harming self Progressing     Attend and participate in unit activities, including therapeutic, recreational, and educational groups Progressing     Recognize maladaptive responses and adopt new coping mechanisms Progressing     Complete daily ADLs, including personal hygiene independently, as able Progressing        Risk for Violence/Aggression Toward Others     Treatment Goal: Refrain from acts of violence/aggression during length of stay, and demonstrate improved impulse control at the time of discharge Progressing     Verbalize thoughts and feelings Progressing     Refrain from harming others Progressing     Refrain from destructive acts on the environment or property Progressing     Control angry outbursts Progressing     Attend and participate in unit activities, including therapeutic, recreational, and educational groups Progressing     Identify appropriate positive anger management techniques Progressing

## 2018-06-24 NOTE — NURSING NOTE
Jael Myers had a noted irritable edge during assessment  Stated that he was feeling "fine"  Denied active hallucinations, but appears internally preoccupied  No overt aggression noted  Maintained on Q 15 minute safety checks  No acting out, suicidal ideations, or homicidal behaviors  No change in medical condition or current complaints  Remains a fall risk  Fluids maintained at bedside to promote hydration

## 2018-06-24 NOTE — PROGRESS NOTES
Sebastian Peters 33 y o  Sex:male; [facility]#[id number]        History of Present Illness  This is a 15 minute progress note on Sebastian Peters, of which five minutes was spent in coordination of care, which consisted of meeting with the nursing staff to discuss the patient's progress, response to treatment, documentation, medication orders,and behaviors over the past 24 hours  Sebastian Peters was seen on the unit today  Chart reviewed  Nursing reports that the patient eats and sleeps well  The patient told me he feels much better today  He has been observed socializing appropriately with staff and peers  He denies any adverse side effects from using his medication  He said that he no longer has visual hallucinations  He still has auditory hallucinations         Mental Status Evaluation  Adult male  Not in acute distress  Casually dressed  Poor hygiene  Friendly on approach  Good eye contact  No psychomotor agitation or retardation  Speech: poverty of speech  Mood has improved  He is less anxious  Thought process: less tangential  Thought content: he denies suicidal or homicidal thoughts or plans  He reports hallucinations  Hehas been observed responding to internal stimuli  He has not been aggressive to self or others since he came to the unit  Poor attention and  Concentration  Poor short term and long term memory  Limited understanding of his mental illness  Compliant with his treatment plan      Diagnosis  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  Mood disorder unspecified  Tobacco use disorder      Assessment/ Plan    Tyler Davenport been using crystal meth prior to admission  A drug-induced psychosis resembling paranoid schizophrenia can occur with repeated high-dose use of methamphetamine in vulnerable individuals,  and neuroleptics may prevent the recurrence of further psychosis [place], 2006)   The patient has responded to treatment with medication and the therapeutic lieberman milieu  He reports that visual hallucinations have ceased, but auditory hallucinations persist  Consider Quetiapine  Continue current medication and treatment  Discharge planning and disposition is ongoing

## 2018-06-24 NOTE — PROGRESS NOTES
Gadiel Waite 33 y o  Sex:male; FKQ#[id number]           History of Present Illness  This is a 15 minute progress note on Gadiel Waite, of which five minutes was spent in coordination of care, which consisted of meeting with the nursing staff to discuss the patient's progress, response to treatment, documentation, medication orders,and behaviors over the past 24 hours  Gadiel Waite was seen on the unit today  Chart reviewed  Nursing reports that the patient eats and sleeps well  The patient told me he feels much better today  He has been observed socializing appropriately with staff and peers  Ashley Patel denies any adverse side effects from using his medication  He said that he no longer has visual hallucinations  He still has auditory hallucinations         Mental Status Evaluation  Adult male    Casually dressed  Poor hygiene  Friendly on approach  Good eye contact  No psychomotor agitation or retardation  Speech: poverty of speech  Mood has improved  He is less anxious  Thought process: less tangential  Thought content: he denies suicidal or homicidal thoughts or plans  He reports command auditory  hallucinations  Hehas been observed responding to internal stimuli  He has not been aggressive to self or others since he came to the unit  Poor attention and  Concentration  Poor short term and long term memory  Limited understanding of his mental illness  Compliant with his treatment plan      Diagnosis  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  Mood disorder unspecified  Tobacco use disorder      Assessment/ Plan    Day John been using crystal meth prior to admission  A drug-induced psychosis resembling paranoid schizophrenia can occur with repeated high-dose use of methamphetamine in vulnerable individuals,  and neuroleptics may prevent the recurrence of further psychosis [employer], 2006)   The patient has responded to treatment with medication and the therapeutic lieberman milieu  He reports that visual hallucinations have ceased, but auditory hallucinations persist  Increase Invega to 6mg daily- additional 3mg today    Continue current medication and treatment  Discharge planning and disposition is ongoing

## 2018-06-24 NOTE — NURSING NOTE
Pt reports feeling stressed out due to continued auditory hallucinations  Pt given verbal education meds and PRNs administered, which were affective

## 2018-06-25 RX ADMIN — BENZTROPINE MESYLATE 1 MG: 1 TABLET ORAL at 09:10

## 2018-06-25 RX ADMIN — PALIPERIDONE 3 MG: 3 TABLET, EXTENDED RELEASE ORAL at 09:10

## 2018-06-25 RX ADMIN — CARBAMAZEPINE 200 MG: 200 TABLET ORAL at 21:49

## 2018-06-25 RX ADMIN — LEVOTHYROXINE SODIUM 50 MCG: 25 TABLET ORAL at 05:25

## 2018-06-25 RX ADMIN — CARBAMAZEPINE 200 MG: 200 TABLET ORAL at 09:10

## 2018-06-25 RX ADMIN — BENZTROPINE MESYLATE 1 MG: 1 TABLET ORAL at 21:49

## 2018-06-25 NOTE — PLAN OF CARE
Alteration in Thoughts and Perception     Treatment Goal: Gain control of psychotic behaviors/thinking, reduce/eliminate presenting symptoms and demonstrate improved reality functioning upon discharge Progressing     Verbalize thoughts and feelings Progressing     Refrain from acting on delusional thinking/internal stimuli Progressing     Agree to be compliant with medication regime, as prescribed and report medication side effects Progressing     Attend and participate in unit activities, including therapeutic, recreational, and educational groups Progressing     Recognize dysfunctional thoughts, communicate reality-based thoughts at the time of discharge Progressing     Complete daily ADLs, including personal hygiene independently, as able Progressing        Anxiety     Anxiety is at manageable level Progressing        Individualized Interventions     Patient will verbalize need for hospitalization and will no longer attempt elopement within 5 days Progressing     Patient will recognize inappropriate behaviors and develop alternative behaviors within 5 days Progressing        Ineffective Coping     Participates in unit activities Progressing        Risk for Self Injury/Neglect     Treatment Goal: Remain safe during length of stay, learn and adopt new coping skills, and be free of self-injurious ideation, impulses and acts at the time of discharge Progressing     Verbalize thoughts and feelings Progressing     Refrain from harming self Progressing     Attend and participate in unit activities, including therapeutic, recreational, and educational groups Progressing     Recognize maladaptive responses and adopt new coping mechanisms Progressing     Complete daily ADLs, including personal hygiene independently, as able Progressing        Risk for Violence/Aggression Toward Others     Treatment Goal: Refrain from acts of violence/aggression during length of stay, and demonstrate improved impulse control at the time of discharge Progressing     Verbalize thoughts and feelings Progressing     Refrain from harming others Progressing     Refrain from destructive acts on the environment or property Progressing     Control angry outbursts Progressing     Attend and participate in unit activities, including therapeutic, recreational, and educational groups Progressing     Identify appropriate positive anger management techniques Progressing

## 2018-06-25 NOTE — NURSING NOTE
Withdrawn to room mostly  No acting out noted  Continues to have active auditory hallucinations  Maintained on Q 15 minute safety checks  No acting out, suicidal ideations, and/or homicidal behaviors  No changes in medical condition or current complaints noted  Remains a fall risk  Fluids maintained at bedside to promote hydration

## 2018-06-25 NOTE — PROGRESS NOTES
Sw met with patient  Patient reports; voices kept me up last night, I didn't get to sleep until 5am  Pt reports the voices aren't as bad today  Patient pleasant and cooperative with staff  Denies current SI/HI/VH  Encouraged pt to attend group therapy sessions

## 2018-06-25 NOTE — NURSING NOTE
Pt is flat and remains WD to self  Pt admits that he is still having AH  Pt stated that they are a little more controlled  Pt does not elaborate on what the voices are saying  No c/o pain  No S/S of distress at this time  Pt tends to his hygiene and ADL's  Pt is able to make his needs known  No issues with pt trying to elope at this time  No outbursts at this time  Pt has been compliant with meds  Pt cooperative with staff  Denies SI/HI at this time  Will continue Q 15 min checks to maintain pt safety

## 2018-06-25 NOTE — PROGRESS NOTES
Angela hart o  Sex:male; [facility]#[id number]           History of Present Illness  This is a 25 minute progress note on Grace Armstrong, of which fifteen minutes was spent in coordination of care, which consisted of meeting with the multidisciplinary treatment team to discuss the patient's progress, response to treatment, documentation, medication orders,and behaviors over the past 24 hours  Nadiya POMPA Tien was seen on the unit today  Chart reviewed  Nursing reports that the patient eats and sleeps well  The patient told me he feels 'okay' today  He has been observed socializing appropriately with staff and peers  Shaniqua Champion denies any adverse side effects from using his medication  He said that he no longer has visual hallucinations  He still has auditory hallucinations         Mental Status Evaluation  Adult male  Casually dressed  Poor hygiene  Cooperative  Good eye contact  No psychomotor agitation or retardation  Speech: poverty of speech  Mood has improved  Affect: restricted  Thought content: he denies suicidal or homicidal thoughts or plans  He reports command auditory  hallucinations  He has been observed responding to internal stimuli  He has not been aggressive to self or others since he came to the unit  Poor attention and  Concentration  Poor short term and long term memory  Limited understanding of his mental illness  Compliant with his treatment plan      Diagnosis  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

## 2018-06-25 NOTE — PROGRESS NOTES
SW received a phone call from pt's friend, Deepti Igor [phone]  Farrah Preciado stated pt has called her five times today about being discharged  Pt believes he is being discharged today  Farrah Preciado stated concerns for pt's mental health  When Farrah Preciado visits, she notices pt having agitation  Farrah Preciado stated she did not feel current medications are working    CASTRO will discuss concerns with treatment team

## 2018-06-26 LAB
ALBUMIN SERPL BCP-MCNC: 4.1 G/DL (ref 3.5–5.7)
ALP SERPL-CCNC: 52 U/L (ref 40–150)
ALT SERPL W P-5'-P-CCNC: 18 U/L (ref 7–52)
ANION GAP SERPL CALCULATED.3IONS-SCNC: 9 MMOL/L (ref 4–13)
AST SERPL W P-5'-P-CCNC: 12 U/L (ref 13–39)
BILIRUB DIRECT SERPL-MCNC: 0 MG/DL (ref 0–0.2)
BILIRUB SERPL-MCNC: 0.4 MG/DL (ref 0.2–1)
BUN SERPL-MCNC: 22 MG/DL (ref 7–25)
CALCIUM SERPL-MCNC: 9.3 MG/DL (ref 8.6–10.5)
CHLORIDE SERPL-SCNC: 101 MMOL/L (ref 98–107)
CO2 SERPL-SCNC: 27 MMOL/L (ref 21–31)
CREAT SERPL-MCNC: 0.86 MG/DL (ref 0.7–1.3)
GFR SERPL CREATININE-BSD FRML MDRD: 116 ML/MIN/1.73SQ M
GLUCOSE SERPL-MCNC: 90 MG/DL (ref 65–99)
POTASSIUM SERPL-SCNC: 3.8 MMOL/L (ref 3.5–5.5)
PROT SERPL-MCNC: 6.9 G/DL (ref 6.4–8.9)
SODIUM SERPL-SCNC: 137 MMOL/L (ref 134–143)

## 2018-06-26 PROCEDURE — 80053 COMPREHEN METABOLIC PANEL: CPT

## 2018-06-26 PROCEDURE — 82248 BILIRUBIN DIRECT: CPT

## 2018-06-26 RX ORDER — QUETIAPINE FUMARATE 25 MG/1
25 TABLET, FILM COATED ORAL 2 TIMES DAILY
Status: DISCONTINUED | OUTPATIENT
Start: 2018-06-26 | End: 2018-06-27

## 2018-06-26 RX ADMIN — QUETIAPINE FUMARATE 25 MG: 25 TABLET ORAL at 17:13

## 2018-06-26 RX ADMIN — CARBAMAZEPINE 200 MG: 200 TABLET ORAL at 20:55

## 2018-06-26 RX ADMIN — LEVOTHYROXINE SODIUM 50 MCG: 25 TABLET ORAL at 05:43

## 2018-06-26 RX ADMIN — CARBAMAZEPINE 200 MG: 200 TABLET ORAL at 09:08

## 2018-06-26 RX ADMIN — BENZTROPINE MESYLATE 1 MG: 1 TABLET ORAL at 09:07

## 2018-06-26 RX ADMIN — PALIPERIDONE 3 MG: 3 TABLET, EXTENDED RELEASE ORAL at 09:08

## 2018-06-26 RX ADMIN — BENZTROPINE MESYLATE 1 MG: 1 TABLET ORAL at 20:55

## 2018-06-26 RX ADMIN — QUETIAPINE FUMARATE 25 MG: 25 TABLET ORAL at 12:25

## 2018-06-26 RX ADMIN — CHLORPROMAZINE HYDROCHLORIDE 25 MG: 25 TABLET, SUGAR COATED ORAL at 21:23

## 2018-06-26 NOTE — NURSING NOTE
Pt remains flat and mostly WD to self  Pt is still internally preoccupied and having AH  Pt refused to have his Tegretol lab drawn because he stated "the voices are telling me not to "   Explained to the pt the importance of having the labs done  Pt stated the voices are telling me that "too much blood went out today "  Unable to draw the lab  Pt denies any VH at this time  Pt denies AH telling him to harm himself or others  Denies SI/HI at this time  Pt compliant with meds and meals  No exit seeking at this time  No c/o pain  No S/S of distress at this time  Will continue Q 15 min checks to maintain pt safety

## 2018-06-26 NOTE — NURSING NOTE
Spent the evening withdrawn to room and sleeping in bed  Pt denies changes in condition for the past few days  Continues to reports active auditory hallucinations which are "tolerable"  Also reports occasional visual hallucinations of people he recognizes but aren't really here  States he is coping by just "laughing it off"  Pt is calm and attentive during interaction  Appropriate responses to prompting  Poor insight  Denies SI or other acute concerns  Med compliant  Agreeable to making needs known   Will maintain sp1 and 15 minute checks for safety

## 2018-06-26 NOTE — PROGRESS NOTES
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)  Active Problems:    Unspecified mood (affective) disorder (HCC)    Amphetamine abuse    Behavior over the last 24 hours:  Patient secluded to room and sleeping intermittently per nursing report  He has not exhibited exit-seeking behaviors over the past 24 hours  Appetite good  During interview today, Anne-Marie Lynch was internally preoccupied and attests that he is hearing voices all of the time  He relates one of the voices is that of his brother who wants to "keep me here forever"  He states the voices are non-commanding but are insulting  He feels Mer[facility]bar[facility] Lee is not helping  He appears calm and offers no other complaints at this time  He rates his mood as "OK" and states he feels safe here  Sleep: normal  Appetite: normal  Medication side effects: No  ROS: no complaints    Mental Status Evaluation:  Appearance:  casually dressed and disheveled   Behavior:  restless and fidgety   Speech:  normal pitch, normal volume and tangential   Mood:  anxious   Affect:  mood-congruent   Thought Process:  circumstantial, disorganized, flight of ideas, illogical, loose associations and tangential   Thought Content:  paranoia toward family   Perceptual Disturbances: Auditory hallucinations without commands   Risk Potential: Pt  denies ISIAH   Sensorium:  person and place and time   Cognition:  grossly intact   Consciousness:  alert and awake    Attention: impaired   Insight:  limited   Judgment: limited   Gait/Station: normal gait/station   Motor Activity: no abnormal movements     Progress Toward Goals: Unimproved    Recommended Treatment: Continue with group therapy, milieu therapy and occupational therapy        Risks, benefits and possible side effects of Medications:   Risks, benefits, and possible side effects of medications explained to patient and patient verbalizes understanding  Medications: all current active meds have been reviewed  Labs: I have personally reviewed all laboratory/test results  Counseling / Coordination of Care  Medications and treatment plan reviewed with physician and treatment team, and per physician, treatment plan is as follows:  1  Will D/C Invega due to inefectiveness  2  Will initiate Seroquel 25mg PO BID for psychotic symptoms  3  Will continue to observe patient and re-adjust medications as needed  4  Will encourage patient to participate in groups in order to benefit from the therapeutic milieu as tolerated

## 2018-06-26 NOTE — PLAN OF CARE
PT has been coming to 45% of Art Therapy groups with much encouragement  Pt lacks motivation into participation in art therapy groups but has positive interactions when he participates  Pt is pleasant in social interactions  PT will continue to be encouraged to attend art therapy groups  Will continue to follow

## 2018-06-26 NOTE — SOCIAL WORK
SW attempted to meet with patient for clinical update  Patient was sleeping soundly at the times contact was attempted  Will return to evaluate at a later time

## 2018-06-27 RX ORDER — QUETIAPINE FUMARATE 25 MG/1
50 TABLET, FILM COATED ORAL 2 TIMES DAILY
Status: DISCONTINUED | OUTPATIENT
Start: 2018-06-27 | End: 2018-06-29

## 2018-06-27 RX ADMIN — CARBAMAZEPINE 200 MG: 200 TABLET ORAL at 08:33

## 2018-06-27 RX ADMIN — BENZTROPINE MESYLATE 1 MG: 1 TABLET ORAL at 21:05

## 2018-06-27 RX ADMIN — QUETIAPINE FUMARATE 50 MG: 25 TABLET ORAL at 17:41

## 2018-06-27 RX ADMIN — CHLORPROMAZINE HYDROCHLORIDE 25 MG: 25 TABLET, SUGAR COATED ORAL at 21:07

## 2018-06-27 RX ADMIN — BENZTROPINE MESYLATE 1 MG: 1 TABLET ORAL at 08:33

## 2018-06-27 RX ADMIN — LEVOTHYROXINE SODIUM 50 MCG: 25 TABLET ORAL at 06:49

## 2018-06-27 RX ADMIN — QUETIAPINE FUMARATE 25 MG: 25 TABLET ORAL at 08:33

## 2018-06-27 RX ADMIN — CARBAMAZEPINE 200 MG: 200 TABLET ORAL at 21:05

## 2018-06-27 NOTE — DISCHARGE INSTR - OTHER ORDERS
You are being discharged to     Triggers you have identified during your hospital that led to distressed mood and auditory hallucinations include: long history of substance abuse and a necessary medication adjustment  Coping skills you have identified during your hospitalization include: talking to your brother and listening to music  If you are unable to deal with your distressed mood alone please call your psychiatric provider, Dr Lior Davis  If that is not effective and you continue to have distressed mood, please call [facility] at [phone], dial [phone], or go to the nearest emergency center

## 2018-06-27 NOTE — PLAN OF CARE
Alteration in Thoughts and Perception     Treatment Goal: Gain control of psychotic behaviors/thinking, reduce/eliminate presenting symptoms and demonstrate improved reality functioning upon discharge Not Progressing     Attend and participate in unit activities, including therapeutic, recreational, and educational groups Not Progressing        Anxiety     Anxiety is at manageable level Not Progressing        Risk for Self Injury/Neglect     Treatment Goal: Remain safe during length of stay, learn and adopt new coping skills, and be free of self-injurious ideation, impulses and acts at the time of discharge Not Progressing     Recognize maladaptive responses and adopt new coping mechanisms Not Progressing        Risk for Violence/Aggression Toward Others     Identify appropriate positive anger management techniques Not Progressing          Alteration in Thoughts and Perception     Verbalize thoughts and feelings Progressing     Refrain from acting on delusional thinking/internal stimuli Progressing     Agree to be compliant with medication regime, as prescribed and report medication side effects Progressing     Recognize dysfunctional thoughts, communicate reality-based thoughts at the time of discharge Progressing     Complete daily ADLs, including personal hygiene independently, as able Progressing        Individualized Interventions     Patient will verbalize need for hospitalization and will no longer attempt elopement within 5 days Progressing     Patient will recognize inappropriate behaviors and develop alternative behaviors within 5 days Progressing        Risk for Self Injury/Neglect     Refrain from harming self Progressing        Risk for Violence/Aggression Toward Others     Treatment Goal: Refrain from acts of violence/aggression during length of stay, and demonstrate improved impulse control at the time of discharge Progressing     Refrain from harming others Progressing     Refrain from destructive acts on the environment or property Progressing     Control angry outbursts Progressing

## 2018-06-27 NOTE — NURSING NOTE
Pt bright alert for breakfast, ate in dining room but back to bed after eating  Pt did shower without resistance  Pt denies si hi and c/o non-command auditory hallucinations that are "softer and not as often"  Pt seen by provider and increases eroquel from 25mg to 50mg, pt agreeable  Will maintain on safety precautions and 15 minute checks  No needs identified

## 2018-06-27 NOTE — PROGRESS NOTES
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)  Active Problems:    Unspecified mood (affective) disorder (HCC)    Amphetamine abuse      Behavior over the last 24 hours:  Unchanged  Nursing reports that Andre Crum refused morning labs, which included carbamazepine level  He remains very delusional, frequently observed to be responding to internal stimuli  He was cooperative throughout the interview  He states he doesn't feel he is improving at all  He continues to report that there are "still people in my head " He reports the voices are not improving, but thinks maybe the visual hallucinations are slightly less  He is tolerating a trial of Seroquel thus far, with no adverse effects  Sleep: normal  Appetite: normal  Medication side effects: No  ROS: no complaints    Mental Status Evaluation:  Appearance:  age appropriate and disheveled   Behavior:  cooperative   Speech:  normal   Mood:  anxious and dysthymic   Affect:  mood-congruent   Thought Process:  disorganized and tangential   Thought Content:  delusions  paranoid   Perceptual Disturbances: Auditory hallucinations without commands and Visual hallucinations   Risk Potential: denies suicidal and homicidal ideations   Sensorium:  person, place, time/date and situation   Cognition:  grossly intact   Consciousness:  alert and awake    Attention: attention span appeared shorter than expected for age   Insight:  limited   Judgment: limited   Gait/Station: normal gait/station   Motor Activity: no abnormal movements     Progress Toward Goals: Ongoing    Recommended Treatment: Continue with group therapy, milieu therapy and occupational therapy        Risks, benefits and possible side effects of Medications:   Risks, benefits, and possible side effects of medications explained to patient and patient verbalizes understanding  Medications: all current active meds have been reviewed and planned medication changes: Increase Seroquel dose and monitor response and tolerability       Labs: Reviewed    Counseling / Coordination of Care  Total floor / unit time spent today 15 minutes  Greater than 50% of total time was spent with the patient and / or family counseling and / or coordination of care   A description of the counseling / coordination of care: 15

## 2018-06-27 NOTE — PLAN OF CARE
PT continues to decline most art therapy groups and is mostly isolative to pt room   Will continue to encourage groups

## 2018-06-27 NOTE — NURSING NOTE
Pt has been withdrawn to self and room  Little activity noted in the community  No socialization with peers  No longer seeks out HS medication, requires staff prompting  Pt continues to report auditory hallucinations that are bothersome and causing him to be "grumpy"  He states the voices haven't changed in volume but that there are more voices now  Pt was actively responding to internal stimulation during interaction with this writer  Required restatement of assessment questions  Pt made request for PRN medication to help with sleep  Pt reports poor sleep was d/t voices  PRN thorazine was administered for hallucinations  Pt was asleep shortly after  Pt does deny SI  Pt denies elopement urges  Pt is able to make needs known and agrees to report changes in condition/needs to staff  Nursing will continue to monitor and assess for changes and safety with SP1 and 15 minute checks

## 2018-06-27 NOTE — NURSING NOTE
With use of PRN thorazine, Pt is and has been sleeping soundly through the night  No periods of waking have been observed  No acute concerns have been noted  SP1 and 15 minute checks have been maintained incident free   Will continue to monitor

## 2018-06-27 NOTE — PROGRESS NOTES
SW met with patient  Patient in bed this afternoon  Pt reports hearing voices;"trying to mess with me"  Patient denies current SI/HI/VH/, pt is less paranoid  Patient has insight to his limitations  SW discussesd with patient the importance of medication compliance; attending groups, and utilizing coping skills

## 2018-06-28 RX ADMIN — LEVOTHYROXINE SODIUM 50 MCG: 25 TABLET ORAL at 06:12

## 2018-06-28 RX ADMIN — QUETIAPINE FUMARATE 50 MG: 25 TABLET ORAL at 08:43

## 2018-06-28 RX ADMIN — BENZTROPINE MESYLATE 1 MG: 1 TABLET ORAL at 21:03

## 2018-06-28 RX ADMIN — BENZTROPINE MESYLATE 1 MG: 1 TABLET ORAL at 08:44

## 2018-06-28 RX ADMIN — QUETIAPINE FUMARATE 50 MG: 25 TABLET ORAL at 18:02

## 2018-06-28 RX ADMIN — CHLORPROMAZINE HYDROCHLORIDE 25 MG: 25 TABLET, SUGAR COATED ORAL at 21:04

## 2018-06-28 RX ADMIN — CARBAMAZEPINE 200 MG: 200 TABLET ORAL at 08:44

## 2018-06-28 RX ADMIN — CARBAMAZEPINE 200 MG: 200 TABLET ORAL at 21:04

## 2018-06-28 NOTE — CASE MANAGEMENT
Pt requested to meet with  to discuss discharge  He stated he has been here a long time  Pt states he still has hallucinations both auditory and visual but the visual are much decreased  Pt states that he believes his brothers are causing these hallucinations

## 2018-06-28 NOTE — PROGRESS NOTES
PT has been attending art therapy groups  Pt has currently been declining to come to art therapy groups but continues to be encouraged to attend  Pt has been pleasant with both staff and peers  Will continue to follow

## 2018-06-28 NOTE — NURSING NOTE
Pt has been withdrawn to his room this shift  Pt presents as disheveled  Flat affect  Poor eye contact  Depressed mood  No attendance to evening programming or snack  When prompted for such, pt reports he is avoiding the community as it increases his aud stein  Pt does report active aud stein  Pt informs they are his brothers voices tonight and they are "pissing me off"  Pt was speaking negatively of his brothers d/t the hallucinations  With attempted reality orientation, pt angrily replied, "no, they are real people you know, they are actually telling me these things"  Pt denies seeing his brothers on the unit and states only one has visited him  Pt admitted to feelings of agitation  Pt shows little insight when expressing desire to be discharged given no improvement in condition  Continues to deny SI  Denies HI or urges to elope  After discussion pt requested PRN thorazine which he reports was effective in calming the hallucinations and promoting sleep last night  PRN thorazine mg was administered  Pt is able to make needs known and agrees to report changes in condition/needs to staff  Nursing will continue to monitor and assess for changes and safety with SP1 and 15 minute checks

## 2018-06-28 NOTE — NURSING NOTE
Following receipt of PRN thorazine, Pt is and has been sleeping soundly through the night  No periods of waking have been observed  No acute concerns have been noted  SP1 and 15 minute checks have been maintained incident free   Will continue to monitor

## 2018-06-28 NOTE — PLAN OF CARE
Problem: Nutrition/Hydration-ADULT  Goal: Nutrient/Hydration intake appropriate for improving, restoring or maintaining nutritional needs  Monitor and assess patient's nutrition/hydration status for malnutrition (ex- brittle hair, bruises, dry skin, pale skin and conjunctiva, muscle wasting, smooth red tongue, and disorientation)  Collaborate with interdisciplinary team and initiate plan and interventions as ordered  Monitor patient's weight and dietary intake as ordered or per policy  Utilize nutrition screening tool and intervene per policy  Determine patient's food preferences and provide high-protein, high-caloric foods as appropriate  INTERVENTIONS:  - Monitor oral intake, urinary output, labs, and treatment plans  - Assess nutrition and hydration status and recommend course of action  - Evaluate amount of meals eaten  - Assist patient with eating if necessary   - Allow adequate time for meals  - Recommend/ encourage appropriate diets, oral nutritional supplements, and vitamin/mineral supplements  - Order, calculate, and assess calorie counts as needed  - Recommend, monitor, and adjust tube feedings and TPN/PPN based on assessed needs  - Assess need for intravenous fluids  - Provide specific nutrition/hydration education as appropriate  - Include patient/family/caregiver in decisions related to nutrition  Outcome: Progressing  He is on a regular meal plan double portions with ensure at meals  His skin is intact  His intake ranges form 0 to 100 %  He is taking the ensure per staff  His weight was 185 on 6-22 then up 2 #  to 187 on 6-23 which was desirable  He is on a nicotine patch  RDN visited on rounds this am and he did eat his breakfast in the dining room  His albumin was 4 1 and BG was 90 on 6-26 which are both WNL  RDN will evaluate his labs when available    RDN to reassess his nutrition needs at low risk and follow PRN with his team

## 2018-06-28 NOTE — NURSING NOTE
On approach for blood work, pt again refused ordered Tegretol level stating, "No, I have had enough blood taken out of me"  Even after education on importance of monitoring blood levels when taking tegretol was offered, pt declined   Will endorse to day shift nursing

## 2018-06-29 LAB — CARBAMAZEPINE SERPL-MCNC: 8.2 UG/ML (ref 4–12)

## 2018-06-29 PROCEDURE — 80156 ASSAY CARBAMAZEPINE TOTAL: CPT

## 2018-06-29 PROCEDURE — 99232 SBSQ HOSP IP/OBS MODERATE 35: CPT | Performed by: PHYSICIAN ASSISTANT

## 2018-06-29 RX ORDER — QUETIAPINE FUMARATE 100 MG/1
100 TABLET, FILM COATED ORAL
Status: DISCONTINUED | OUTPATIENT
Start: 2018-06-29 | End: 2018-06-30

## 2018-06-29 RX ORDER — QUETIAPINE FUMARATE 25 MG/1
50 TABLET, FILM COATED ORAL
Status: DISCONTINUED | OUTPATIENT
Start: 2018-06-30 | End: 2018-07-01

## 2018-06-29 RX ADMIN — BENZTROPINE MESYLATE 1 MG: 1 TABLET ORAL at 21:11

## 2018-06-29 RX ADMIN — CARBAMAZEPINE 200 MG: 200 TABLET ORAL at 21:11

## 2018-06-29 RX ADMIN — QUETIAPINE FUMARATE 100 MG: 100 TABLET ORAL at 21:11

## 2018-06-29 RX ADMIN — LEVOTHYROXINE SODIUM 50 MCG: 25 TABLET ORAL at 05:43

## 2018-06-29 RX ADMIN — BENZTROPINE MESYLATE 1 MG: 1 TABLET ORAL at 09:39

## 2018-06-29 RX ADMIN — QUETIAPINE FUMARATE 50 MG: 25 TABLET ORAL at 09:39

## 2018-06-29 RX ADMIN — CARBAMAZEPINE 200 MG: 200 TABLET ORAL at 09:39

## 2018-06-29 NOTE — NURSING NOTE
Collins Stacks remains mildly paranoid  Continues to have auditory hallucination  Complaint with medications  Maintained on Q 15 minute safety checks  No acting out, suicidal ideations, and/or homicidal behaviors  No changes in medical condition or current complaints noted  Remains a fall risk  Fluids maintained at bedside to promote hydration

## 2018-06-29 NOTE — SOCIAL WORK
SW met with pt privately  Pt presents as calm but internally preoccupied at times  Pt reports that he is having AH and states that he believes it is his brothers and they are making fun of him  He reports that sometimes it makes him really angry  Pt expressed frustration regarding medication not helping  Pt is hoping to discharge next week and hopes they will find a medication that helps  Pt states that hallucinations have not improved and that this has been going on for 4 months  Pt states that even if they don't go away he wants to discharge next week as he doesn't feel he is a danger to himself or others  Pt denies depression, SI, and HI  Pt rates anxiety 5/10  Pt admits to [facility]; states that he see the "crab franca "  Pt states he feels safe in the hospital   SW spoke to pt about the ultimate goal is to eliminate AH/VH and at a minimum at least reduce them to a more controllable level  SW spoke to pt about coping skills he can use to maintain control and not escalate when [facility] becomes overwhelming  Pt identified: exercising, carving wood, and talking to his friend Deniz Villaloboss  Pt states that he has been opening up to staff more on the unit which is somewhat helpful  SW encouraged pt to continue to open up to staff and to speak openly to psychiatrist/CRNP about medication effectiveness and MH symptoms  Additionally, pt stated that he will likely return home rather than going to his friends  No needs expressed or behaviors observed

## 2018-06-29 NOTE — PROGRESS NOTES
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)  Active Problems:    Unspecified mood (affective) disorder (HCC)    Amphetamine abuse      Behavior over the last 24 hours:  unchanged  Sleep: normal  Appetite: normal  Medication side effects: No  ROS: no complaints    Mental Status Evaluation:  Appearance:  age appropriate and disheveled   Behavior:  cooperative   Speech:  soft   Mood:  anxious   Affect:  constricted   Thought Process:  blocked   Thought Content:  delusions  paranoid   Perceptual Disturbances: Auditory hallucinations without commands and Visual hallucinations   Risk Potential: No SI/HI   Sensorium:  person, place, time/date and situation   Cognition:  grossly intact   Consciousness:  alert and awake    Attention: attention span appeared shorter than expected for age   Insight:  limited   Judgment: limited   Gait/Station: normal gait/station   Motor Activity: no abnormal movements     Progress Toward Goals: Ongoing    Recommended Treatment: Continue with group therapy, milieu therapy and occupational therapy  Risks, benefits and possible side effects of Medications:   Risks, benefits, and possible side effects of medications explained to patient and patient verbalizes understanding  Medications: all current active meds have been reviewed and planned medication changes: Increase Seroquel to 50mg in morning and afternoon, 100mg qhs       Labs: Reviewed      Counseling / Coordination of Care  Total floor / unit time spent today 15 minutes  Greater than 50% of total time was spent with the patient and / or family counseling and / or coordination of care   A description of the counseling / coordination of care: 15

## 2018-06-29 NOTE — PLAN OF CARE
Problem: Alteration in Thoughts and Perception  Goal: Treatment Goal: Gain control of psychotic behaviors/thinking, reduce/eliminate presenting symptoms and demonstrate improved reality functioning upon discharge  Outcome: Not Progressing    Goal: Verbalize thoughts and feelings  Interventions:  - Promote a nonjudgmental and trusting relationship with the patient through active listening and therapeutic communication  - Assess patient's level of functioning, behavior and potential for risk  - Engage patient in 1 on 1 interactions for a minimum of 15 minutes each session  - Encourage patient to express fears, feelings, frustrations, and discuss symptoms    - [place] patient to reality, help patient recognize reality-based thinking   - Administer medications as ordered and assess for potential side effects  - Provide the patient education related to the signs and symptoms of the illness and desired effects of prescribed medications   Outcome: Not Progressing    Goal: Refrain from acting on delusional thinking/internal stimuli  Interventions:  - Monitor patient closely, per order   - Utilize least restrictive measures   - Set reasonable limits, give positive feedback for acceptable   - Administer medications as ordered and monitor of potential side effects   Outcome: Progressing    Goal: Agree to be compliant with medication regime, as prescribed and report medication side effects  Interventions:  - Offer appropriate PRN medication and supervise ingestion; conduct aims, as needed    Outcome: Progressing    Goal: Attend and participate in unit activities, including therapeutic, recreational, and educational groups  Interventions:  - Provide therapeutic and educational activities daily, encourage attendance and participation, and document same in the medical record    Outcome: Not Progressing    Goal: Recognize dysfunctional thoughts, communicate reality-based thoughts at the time of discharge  Interventions:  - Provide medication and psycho-education to assist patient in compliance and developing insight into his/her illness    Outcome: Not Progressing    Goal: Complete daily ADLs, including personal hygiene independently, as able  Interventions:  - Observe, teach, and assist patient with ADLS  - Monitor and promote a balance of rest/activity, with adequate nutrition and elimination    Outcome: Progressing      Problem: Risk for Self Injury/Neglect  Goal: Treatment Goal: Remain safe during length of stay, learn and adopt new coping skills, and be free of self-injurious ideation, impulses and acts at the time of discharge  Outcome: Progressing    Goal: Refrain from harming self  Interventions:  - Monitor patient closely, per order  - Develop a trusting relationship  - Supervise medication ingestion, monitor effects and side effects    Outcome: Progressing    Goal: Recognize maladaptive responses and adopt new coping mechanisms  Outcome: Not Progressing      Problem: Anxiety  Goal: Anxiety is at manageable level  Interventions:  - Assess and monitor patient's anxiety level  - Monitor for signs and symptoms of anxiety both physical and emotional (heart palpitations, chest pain, shortness of breath, headaches, nausea, feeling jumpy, restlessness, irritable, apprehensive)  - Collaborate with interdisciplinary team and initiate plan and interventions as ordered    - [place] patient to unit/surroundings  - Explain treatment plan  - Encourage participation in care  - Encourage verbalization of concerns/fears  - Identify coping mechanisms  - Assist in developing anxiety-reducing skills  - Administer/offer alternative therapies  - Limit or eliminate stimulants   Outcome: Not Progressing      Problem: Risk for Violence/Aggression Toward Others  Goal: Treatment Goal: Refrain from acts of violence/aggression during length of stay, and demonstrate improved impulse control at the time of discharge  Outcome: Progressing    Goal: Refrain from harming others  Outcome: Progressing    Goal: Refrain from destructive acts on the environment or property  Outcome: Progressing    Goal: Control angry outbursts  Interventions:  - Monitor patient closely, per order  - Ensure early verbal de-escalation  - Monitor prn medication needs  - Set reasonable/therapeutic limits, outline behavioral expectations, and consequences   - Provide a non-threatening milieu, utilizing the least restrictive interventions    Outcome: Progressing    Goal: Identify appropriate positive anger management techniques  Interventions:  - Offer anger management and coping skills groups   - Staff will provide positive feedback for appropriate anger control   Outcome: Progressing      Problem: Individualized Interventions  Goal: Patient will verbalize need for hospitalization and will no longer attempt elopement within 5 days  Interventions:  - Ongoing education to help patient understand need for hospitalization   Outcome: Not Progressing    Goal: Patient will recognize inappropriate behaviors and develop alternative behaviors within 5 days  Interventions:  - Patient in collaboration with Treatment Team will develop a behavior management plan to help identify effective coping skills to deal with stressors   Outcome: Progressing      Problem: Nutrition/Hydration-ADULT  Goal: Nutrient/Hydration intake appropriate for improving, restoring or maintaining nutritional needs  Monitor and assess patient's nutrition/hydration status for malnutrition (ex- brittle hair, bruises, dry skin, pale skin and conjunctiva, muscle wasting, smooth red tongue, and disorientation)  Collaborate with interdisciplinary team and initiate plan and interventions as ordered  Monitor patient's weight and dietary intake as ordered or per policy  Utilize nutrition screening tool and intervene per policy  Determine patient's food preferences and provide high-protein, high-caloric foods as appropriate       INTERVENTIONS:  - Monitor oral intake, urinary output, labs, and treatment plans  - Assess nutrition and hydration status and recommend course of action  - Evaluate amount of meals eaten  - Assist patient with eating if necessary   - Allow adequate time for meals  - Recommend/ encourage appropriate diets, oral nutritional supplements, and vitamin/mineral supplements  - Order, calculate, and assess calorie counts as needed  - Recommend, monitor, and adjust tube feedings and TPN/PPN based on assessed needs  - Assess need for intravenous fluids  - Provide specific nutrition/hydration education as appropriate  - Include patient/family/caregiver in decisions related to nutrition   Outcome: Progressing      Problem: Potential for Falls  Goal: Patient will remain free of falls  INTERVENTIONS:  - Assess patient frequently for physical needs  -  Identify cognitive and physical deficits and behaviors that affect risk of falls    -  [place] fall precautions as indicated by assessment   - Educate patient/family on patient safety including physical limitations  - Instruct patient to call for assistance with activity based on assessment  - Modify environment to reduce risk of injury  - Consider OT/PT consult to assist with strengthening/mobility   Outcome: Progressing

## 2018-06-29 NOTE — NURSING NOTE
Patient observed sleeping during most Q15 minute safety checks (second portion of shift)  No suicidal ideations, acting out or homicidal behaviors  No change in medical condition or complaints voiced  Remains a fall risk  Fluids maintained at bedside to promote hydration

## 2018-06-29 NOTE — PROGRESS NOTES
Sukhdeep Lay y o  Sex:male; ECV#[id number]           History of Present Illness  This is a 25 minute progress note on Grace Armstrong, of which fifteen minutes was spent in coordination of care, which consisted of meeting with the multidisciplinary treatment team to discuss the patient's progress, response to treatment, documentation, medication orders,and behaviors over the past 24 hours  Yesenia Chauhan was seen on the unit today  Chart reviewed  Nursing reports that the patient eats and sleeps well  The patient told me still hears voices  He thinks its his brothers are talking to him, but he is aware that his brothers are not here on the unit      Mental Status Evaluation  Adult male  Casually dressed  Poor hygiene  Cooperative  Good eye contact  He appeared restless  Speech: poverty of speech  Mood has improved  Affect: restricted  Thought content: he denies suicidal or homicidal thoughts or plans  He reports command auditory  hallucinations  He has been observed responding to internal stimuli  He has not been aggressive to self or others since he came to the unit  Poor attention and  Concentration  Poor short term and long term memory  Limited understanding of his mental illness  Compliant with his treatment plan      Diagnosis  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

## 2018-06-29 NOTE — PLAN OF CARE
Problem: DISCHARGE PLANNING - CARE MANAGEMENT  Goal: Discharge to post-acute care or home with appropriate resources  INTERVENTIONS:  - Conduct assessment to determine patient/family and health care team treatment goals, and need for post-acute services based on payer coverage, community resources, and patient preferences, and barriers to discharge  - Address psychosocial, clinical, and financial barriers to discharge as identified in assessment in conjunction with the patient/family and health care team  - Arrange appropriate level of post-acute services according to patient's   needs and preference and payer coverage in collaboration with the physician and health care team  - Communicate with and update the patient/family, physician, and health care team regarding progress on the discharge plan  - Arrange appropriate transportation to post-acute venues  Outcome: Progressing  Pt working with SW to determine safe and appropriate aftercare plan

## 2018-06-29 NOTE — PROGRESS NOTES
Patient eats measl and returns immediately to bed after eating  Pt c/o auditory hallucinations and "wants them to stop" pt denies desire to elope and states mild irritabilty present related to a/h but denies si and HI  Will continue to monitor

## 2018-06-29 NOTE — PLAN OF CARE
Problem: Ineffective Coping  Goal: Participates in unit activities  Interventions:  - Provide therapeutic environment   - Provide required programming   - Redirect inappropriate behaviors   Outcome: Not Progressing  PT continues to decline most art therapy groups and is mostly isolative to pt room, minimal social interactions with peers

## 2018-06-30 PROBLEM — F25.0 SCHIZOAFFECTIVE DISORDER, BIPOLAR TYPE (HCC): Chronic | Status: ACTIVE | Noted: 2018-06-30

## 2018-06-30 PROCEDURE — 93005 ELECTROCARDIOGRAM TRACING: CPT

## 2018-06-30 PROCEDURE — 99231 SBSQ HOSP IP/OBS SF/LOW 25: CPT | Performed by: NURSE PRACTITIONER

## 2018-06-30 RX ADMIN — QUETIAPINE FUMARATE 50 MG: 25 TABLET ORAL at 17:11

## 2018-06-30 RX ADMIN — BENZTROPINE MESYLATE 1 MG: 1 TABLET ORAL at 08:53

## 2018-06-30 RX ADMIN — CARBAMAZEPINE 200 MG: 200 TABLET ORAL at 21:35

## 2018-06-30 RX ADMIN — CARBAMAZEPINE 200 MG: 200 TABLET ORAL at 08:53

## 2018-06-30 RX ADMIN — CHLORPROMAZINE HYDROCHLORIDE 25 MG: 25 TABLET, SUGAR COATED ORAL at 08:53

## 2018-06-30 RX ADMIN — QUETIAPINE FUMARATE 50 MG: 25 TABLET ORAL at 08:53

## 2018-06-30 RX ADMIN — QUETIAPINE FUMARATE 150 MG: 100 TABLET ORAL at 21:36

## 2018-06-30 RX ADMIN — BENZTROPINE MESYLATE 1 MG: 1 TABLET ORAL at 21:35

## 2018-06-30 RX ADMIN — LEVOTHYROXINE SODIUM 50 MCG: 25 TABLET ORAL at 05:56

## 2018-06-30 NOTE — PLAN OF CARE
Individualized Interventions     Patient will verbalize need for hospitalization and will no longer attempt elopement within 5 days Completed     Patient will recognize inappropriate behaviors and develop alternative behaviors within 5 days Completed          Alteration in Thoughts and Perception     Treatment Goal: Gain control of psychotic behaviors/thinking, reduce/eliminate presenting symptoms and demonstrate improved reality functioning upon discharge Not Progressing     Attend and participate in unit activities, including therapeutic, recreational, and educational groups Not Progressing     Complete daily ADLs, including personal hygiene independently, as able Not Progressing        Anxiety     Anxiety is at manageable level Not Progressing        Ineffective Coping     Participates in unit activities Not Progressing        Risk for Violence/Aggression Toward Others     Identify appropriate positive anger management techniques Not Progressing

## 2018-06-30 NOTE — PROGRESS NOTES
Pt is and has been sleeping soundly through the night  No periods of waking have been observed  No acute concerns have been noted  SP1 and 15 minute checks have been maintained incident free   Will continue to monitor

## 2018-06-30 NOTE — PROGRESS NOTES
Progress Note - [address] 33 y o  male MRN: [id number]  Unit/Bed#: [facility] 220-02 Encounter: [id number]    The patient was seen for continuing care and reviewed with treatment team     Mental Status Evaluation:  Appearance:  Adequate hygiene and grooming and Poor eye contact   Behavior:  cooperative and guarded   Mood:  dysphoric   Affect: blunted   Speech: Increased latency of response   Thought Process:  Poverty of thoughts   Thought Content:  Does not verbalize delusional material   Perceptual Disturbances: Auditory hallucinations without commands   Risk Potential: No suicidal or homicidal ideation   Attention/Concentration attention span appeared shorter than expected for age   Orientation:   Oriented x 3   Gait/Station: normal gait/station   Motor Activity: No abnormal movement noted     Progress Toward Goals: Patient reports he continues to hear voices, although he reports they are less bothersome and he can ignore them  He otherwise presents with blunted affect and internally preoccupied but cooperative and agreeable  Staff report no issues  Will continue to titrate up his Seroquel, increasing his HS dose to 150 mg, for total of 250 mg/ day  Will check Carbamezapine level in AM      Assessment/Plan    Principal Problem:    Schizoaffective disorder, bipolar type ([facility] [address] )  Active Problems:    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)    Unspecified mood (affective) disorder (HCC)    Amphetamine abuse      Recommended Treatment: Continue with pharmacotherapy, group therapy, milieu therapy and occupational therapy    The patient will be maintained on the following medications:    Current Facility-Administered Medications:  acetaminophen 650 mg Oral Q4H PRN Provider Cutover   aluminum-magnesium hydroxide-simethicone 30 mL Oral Q4H PRN Provider Cutover   benztropine 1 mg Oral BID Provider Cutover   carBAMazepine 200 mg Oral BID Provider [facility] chlorproMAZINE 25 mg Intramuscular Q6H PRN Provider Cutover   chlorproMAZINE 25 mg Oral Q6H PRN Provider Cutover   diphenhydrAMINE 50 mg Intramuscular Q4H PRN Provider Cutover   diphenhydrAMINE 50 mg Oral Q4H PRN Provider Cutover   levothyroxine 50 mcg Oral Early Morning Provider Cutover   magnesium hydroxide 30 mL Oral Daily PRN Provider Cutover   nicotine 21 mg Transdermal Daily Provider Cutover   QUEtiapine 150 mg Oral HS BRIDGET Rodriguez   QUEtiapine 50 mg Oral BID (AM & Afternoon) Gabbie Milian PA-C   traZODone 50 mg Oral HS PRN Provider Cutover       Risks, benefits and possible side effects of Medications:   Risks, benefits, and possible side effects of medications explained to patient and patient verbalizes understanding

## 2018-06-30 NOTE — PROGRESS NOTES
Pt has been withdrawn to his room for the entire night  No attendance in snack or groups  On approach in room, pt readily sits up in bed and greets staff  Is pleasant  Continues to report active aud hallucination, which he actively laughs and responds to during assessment  Pt denies any acute changes in the hallucinations  Continues to report that they are the voices of his brothers  Denies current agitation/urges to elope  Does agree to notify staff of changes in hallucinations  Denies SI/HI  Pt was compliant with HS medication including new dose of seroquel 100  Pt is able to make needs known and agrees to report changes in condition/needs to staff  Nursing will continue to monitor and assess for changes and safety with SP1 and 15 minute checks

## 2018-06-30 NOTE — PROGRESS NOTES
Patient bright alert for breakfast,  back to bed after eating  Pt remains internally preoccupied, withdrawn to room and continues to c/o non-command auditory hallucinations  Pt denies si hi and visual hallucinations   Patient  Given thorazine prn in am for excessive laughing in bed alone under the covers  Pt asleep shortly thereafter  Will maintain on safety precautions and 15 minute checks    No needs identified

## 2018-07-01 PROCEDURE — 99231 SBSQ HOSP IP/OBS SF/LOW 25: CPT | Performed by: NURSE PRACTITIONER

## 2018-07-01 RX ORDER — QUETIAPINE FUMARATE 100 MG/1
100 TABLET, FILM COATED ORAL
Status: DISCONTINUED | OUTPATIENT
Start: 2018-07-01 | End: 2018-07-06

## 2018-07-01 RX ORDER — QUETIAPINE FUMARATE 200 MG/1
200 TABLET, FILM COATED ORAL
Status: DISCONTINUED | OUTPATIENT
Start: 2018-07-01 | End: 2018-07-03

## 2018-07-01 RX ORDER — CARBAMAZEPINE 200 MG/1
200 TABLET ORAL DAILY
Status: DISCONTINUED | OUTPATIENT
Start: 2018-07-02 | End: 2018-07-07

## 2018-07-01 RX ADMIN — QUETIAPINE FUMARATE 50 MG: 25 TABLET ORAL at 08:43

## 2018-07-01 RX ADMIN — BENZTROPINE MESYLATE 1 MG: 1 TABLET ORAL at 21:20

## 2018-07-01 RX ADMIN — BENZTROPINE MESYLATE 1 MG: 1 TABLET ORAL at 08:42

## 2018-07-01 RX ADMIN — QUETIAPINE FUMARATE 100 MG: 100 TABLET ORAL at 14:13

## 2018-07-01 RX ADMIN — QUETIAPINE FUMARATE 200 MG: 200 TABLET ORAL at 21:20

## 2018-07-01 RX ADMIN — LEVOTHYROXINE SODIUM 50 MCG: 25 TABLET ORAL at 05:15

## 2018-07-01 RX ADMIN — CARBAMAZEPINE 200 MG: 200 TABLET ORAL at 08:43

## 2018-07-01 NOTE — PLAN OF CARE
Alteration in Thoughts and Perception     Treatment Goal: Gain control of psychotic behaviors/thinking, reduce/eliminate presenting symptoms and demonstrate improved reality functioning upon discharge Not Progressing     Attend and participate in unit activities, including therapeutic, recreational, and educational groups Not Progressing     Recognize dysfunctional thoughts, communicate reality-based thoughts at the time of discharge Not Progressing     Complete daily ADLs, including personal hygiene independently, as able Not Progressing        Anxiety     Anxiety is at manageable level Not [facility] Discharge to post-acute care or home with appropriate resources Not Progressing        Ineffective Coping     Participates in unit activities Not Progressing        Risk for Self Injury/Neglect     Refrain from harming self Not Progressing     Recognize maladaptive responses and adopt new coping mechanisms Not Progressing          Alteration in Thoughts and Perception     Verbalize thoughts and feelings Progressing     Refrain from acting on delusional thinking/internal stimuli Progressing     Agree to be compliant with medication regime, as prescribed and report medication side effects Progressing        Nutrition/Hydration-ADULT     Nutrient/Hydration intake appropriate for improving, restoring or maintaining nutritional needs Progressing        Potential for Falls     Patient will remain free of falls Progressing        Risk for Self Injury/Neglect     Treatment Goal: Remain safe during length of stay, learn and adopt new coping skills, and be free of self-injurious ideation, impulses and acts at the time of discharge Progressing        Risk for Violence/Aggression Toward Others     Treatment Goal: Refrain from acts of violence/aggression during length of stay, and demonstrate improved impulse control at the time of discharge Progressing     Refrain from harming others Progressing     Refrain from destructive acts on the environment or property Progressing     Control angry outbursts Progressing     Identify appropriate positive anger management techniques Progressing          Alteration in Thoughts and Perception     Treatment Goal: Gain control of psychotic behaviors/thinking, reduce/eliminate presenting symptoms and demonstrate improved reality functioning upon discharge Not Progressing     Attend and participate in unit activities, including therapeutic, recreational, and educational groups Not Progressing     Recognize dysfunctional thoughts, communicate reality-based thoughts at the time of discharge Not Progressing     Complete daily ADLs, including personal hygiene independently, as able Not Progressing        Anxiety     Anxiety is at manageable level Not Progressing        DISCHARGE PLANNING - CARE MANAGEMENT     Discharge to post-acute care or home with appropriate resources Not Progressing        Ineffective Coping     Participates in unit activities Not Progressing        Risk for Self Injury/Neglect     Refrain from harming self Not Progressing     Recognize maladaptive responses and adopt new coping mechanisms Not Progressing          Alteration in Thoughts and Perception     Verbalize thoughts and feelings Progressing     Refrain from acting on delusional thinking/internal stimuli Progressing     Agree to be compliant with medication regime, as prescribed and report medication side effects Progressing        Nutrition/Hydration-ADULT     Nutrient/Hydration intake appropriate for improving, restoring or maintaining nutritional needs Progressing        Potential for Falls     Patient will remain free of falls Progressing        Risk for Self Injury/Neglect     Treatment Goal: Remain safe during length of stay, learn and adopt new coping skills, and be free of self-injurious ideation, impulses and acts at the time of discharge Progressing        Risk for Violence/Aggression Toward Others     Treatment Goal: Refrain from acts of violence/aggression during length of stay, and demonstrate improved impulse control at the time of discharge Progressing     Refrain from harming others Progressing     Refrain from destructive acts on the environment or property Progressing     Control angry outbursts Progressing     Identify appropriate positive anger management techniques Progressing

## 2018-07-01 NOTE — PLAN OF CARE
Problem: PSYCHOSIS  Goal: Will report no hallucinations or delusions  Interventions:  - Administer medication as  ordered  - Every waking shifts and PRN assess for the presence of hallucinations and or delusions  - Assist with reality testing to support increasing orientation  - Assess if patient's hallucinations or delusions are encouraging self-harm or harm to others and intervene as appropriate  Outcome: Not Progressing

## 2018-07-01 NOTE — PROGRESS NOTES
Progress Note - [address] 33 y o  male MRN: [id number]  Unit/Bed#: Janessa Villeda 220-02 Encounter: [id number]    The patient was seen for continuing care and reviewed with treatment team     Mental Status Evaluation:  Appearance:  Marginal/poor hygiene   Behavior:  guarded   Mood:  dysphoric   Affect: blunted   Speech: Increased latency of response   Thought Process:  Poverty of thoughts and disorganized   Thought Content:  Delusions of persecution   Perceptual Disturbances: Auditory hallucinations without commands   Risk Potential: No suicidal or homicidal ideation   Attention/Concentration attention span appeared shorter than expected for age   Orientation:   Oriented x 3   Gait/Station: normal gait/station   Motor Activity: No abnormal movement noted     Progress Toward Goals: Continues to report AH both "internal and external" per patient  Received PRN thorazine yesterday for AH and laughing in his room alone  Will continue to titrate up his Seroquel while reducing then discontinuing the tegretol - as it may be reducing effectiveness of Seroquel  Assessment/Plan    Principal Problem:    Schizoaffective disorder, bipolar type (HCC)  Active Problems:    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)    Unspecified mood (affective) disorder (HCC)    Amphetamine abuse      Recommended Treatment: Continue with pharmacotherapy, group therapy, milieu therapy and occupational therapy    The patient will be maintained on the following medications:    Current Facility-Administered Medications:  acetaminophen 650 mg Oral Q4H PRN Provider Cutover   aluminum-magnesium hydroxide-simethicone 30 mL Oral Q4H PRN Provider Cutover   benztropine 1 mg Oral BID Provider Cutover   [START ON 7/2/2018] carBAMazepine 200 mg Oral Daily BRIDGET Melo   chlorproMAZINE 25 mg Intramuscular Q6H PRN Provider Cutover   chlorproMAZINE 25 mg Oral Q6H PRN Provider Cutover   diphenhydrAMINE 50 mg Intramuscular Q4H PRN Provider Cutover   diphenhydrAMINE 50 mg Oral Q4H PRN Provider Cutover   levothyroxine 50 mcg Oral Early Morning Provider Cutover   magnesium hydroxide 30 mL Oral Daily PRN Provider Cutover   nicotine 21 mg Transdermal Daily Provider Cutover   QUEtiapine 100 mg Oral BID (AM & Afternoon) BRIDGET [place]   QUEtiapine 200 mg Oral HS BRIDEGT [place]   traZODone 50 mg Oral HS PRN Provider Cutover       Risks, benefits and possible side effects of Medications:   Risks, benefits, and possible side effects of medications explained to patient and patient verbalizes understanding

## 2018-07-01 NOTE — PROGRESS NOTES
Patient alert awake ate breakfast with peers, back to bed after eating  Pt was visible on unit for short period of time but withdrawn to self  Pt states to this nurse" im not better, nothings changed since I got here, Im still hearing the same voices" pt denies si hi and remains controlled on the unit  Will maintain on safety precautions and 15 minute checks  No needs identified

## 2018-07-01 NOTE — PROGRESS NOTES
Pt has been visible in the community this shift  Contrary to previous evenings, pt has been attending all programming and evening snack  Pt does still report hallucinations but reports he is trying to be more involved  Pt reports he has internal voices of his brothers and now also has what he called "external voices" of the state police  Pt was unable to further describe such  Pt was more focused during assessment however-was not observed actively responding to internal preoccupation  Pt was medication compliant including increased dose of seroquel-education provided about increased appetite and smart snacking-pt attentive  Pt denies SI  Denies urges to elope  Pt is able to make needs known and agrees to report changes in condition/needs to staff  Nursing will continue to monitor and assess for changes and safety with 15 minute checks

## 2018-07-02 RX ORDER — LEVOTHYROXINE SODIUM 0.03 MG/1
TABLET ORAL
Status: DISPENSED
Start: 2018-07-02 | End: 2018-07-02

## 2018-07-02 RX ORDER — CHLORPROMAZINE HYDROCHLORIDE 25 MG/1
25 TABLET, FILM COATED ORAL DAILY
Status: DISCONTINUED | OUTPATIENT
Start: 2018-07-02 | End: 2018-07-03

## 2018-07-02 RX ADMIN — BENZTROPINE MESYLATE 1 MG: 1 TABLET ORAL at 08:35

## 2018-07-02 RX ADMIN — QUETIAPINE FUMARATE 100 MG: 100 TABLET ORAL at 08:35

## 2018-07-02 RX ADMIN — QUETIAPINE FUMARATE 100 MG: 100 TABLET ORAL at 17:45

## 2018-07-02 RX ADMIN — CHLORPROMAZINE HYDROCHLORIDE 25 MG: 25 TABLET, SUGAR COATED ORAL at 12:08

## 2018-07-02 RX ADMIN — CARBAMAZEPINE 200 MG: 200 TABLET ORAL at 08:35

## 2018-07-02 RX ADMIN — BENZTROPINE MESYLATE 1 MG: 1 TABLET ORAL at 21:42

## 2018-07-02 RX ADMIN — LEVOTHYROXINE SODIUM 50 MCG: 25 TABLET ORAL at 06:31

## 2018-07-02 RX ADMIN — CHLORPROMAZINE HYDROCHLORIDE 25 MG: 25 TABLET, SUGAR COATED ORAL at 21:42

## 2018-07-02 RX ADMIN — QUETIAPINE FUMARATE 200 MG: 200 TABLET ORAL at 21:43

## 2018-07-02 NOTE — PLAN OF CARE
Problem: Ineffective Coping  Goal: Participates in unit activities  Interventions:  - Provide therapeutic environment   - Provide required programming   - Redirect inappropriate behaviors    Outcome: Not Progressing  PT continues to decline most art therapy groups and is mostly observed isolating in pt room  When prompted to attend art therapy group, PT reports that he either is not interested or that he is tired  PT also declines all offers of educational information  Will continue to follow

## 2018-07-02 NOTE — PROGRESS NOTES
Pt has been withdrawn to his room this evening  Resting quietly in bed  Presents promptly on approach by staff for assessment  Denies acute changes in condition  Continues to report "internal and external" auditory hallucinations  Denies active visual stein but reports he saw the "crab franca" this am  Denies SI/HI  Does offer decent focus during assessment  Was agreeable to newly increased dose of seroquel  Pt is able to make needs known and agrees to report changes in condition/needs to staff  Nursing will continue to monitor and assess for changes and safety with 15 minute checks

## 2018-07-02 NOTE — PROGRESS NOTES
Patient is withdrawn to room  Poor eye contact and dismissive when answering assessment questions  Patient is vague when questioned about delusions and hallucinations  Patient states " I hear a lot of internal and external voices, to be honest nothing is getting any better " Patient appears disheveled  Encouraged patient to shower and participate in personal hygiene  Patient is receptive  Cooperative with medications  Patient denies SI and HI, no behavioral problems  Alert and oriented  Able to make needs known  No signs or symptoms of distress  SP1 and Q 15 minute checks will be maintained for patient safety  Will continue to monitor

## 2018-07-02 NOTE — PROGRESS NOTES
SW met with patient  Pt reports, "I'm still hearing voices"  Pt denies current SI/HI/VH  Pt reports attending one group yesterday and taking all medications as ordered  SW discussed with pt with importance of medication management, utilizing coping skills, and attending group therapy  Will continue to monitor mood and coping

## 2018-07-03 PROCEDURE — 99232 SBSQ HOSP IP/OBS MODERATE 35: CPT | Performed by: PHYSICIAN ASSISTANT

## 2018-07-03 RX ORDER — CHLORPROMAZINE HYDROCHLORIDE 25 MG/1
50 TABLET, FILM COATED ORAL 2 TIMES DAILY
Status: DISCONTINUED | OUTPATIENT
Start: 2018-07-03 | End: 2018-07-07

## 2018-07-03 RX ADMIN — LEVOTHYROXINE SODIUM 50 MCG: 25 TABLET ORAL at 05:46

## 2018-07-03 RX ADMIN — QUETIAPINE FUMARATE 100 MG: 100 TABLET ORAL at 08:06

## 2018-07-03 RX ADMIN — QUETIAPINE FUMARATE 300 MG: 200 TABLET ORAL at 20:41

## 2018-07-03 RX ADMIN — QUETIAPINE FUMARATE 100 MG: 100 TABLET ORAL at 15:30

## 2018-07-03 RX ADMIN — BENZTROPINE MESYLATE 1 MG: 1 TABLET ORAL at 08:06

## 2018-07-03 RX ADMIN — CHLORPROMAZINE HYDROCHLORIDE 50 MG: 25 TABLET, SUGAR COATED ORAL at 17:52

## 2018-07-03 RX ADMIN — CHLORPROMAZINE HYDROCHLORIDE 25 MG: 25 TABLET, SUGAR COATED ORAL at 20:41

## 2018-07-03 RX ADMIN — BENZTROPINE MESYLATE 1 MG: 1 TABLET ORAL at 20:40

## 2018-07-03 RX ADMIN — CARBAMAZEPINE 200 MG: 200 TABLET ORAL at 08:06

## 2018-07-03 RX ADMIN — CHLORPROMAZINE HYDROCHLORIDE 25 MG: 25 TABLET, SUGAR COATED ORAL at 08:06

## 2018-07-03 NOTE — PROGRESS NOTES
Pt has been calm and cooperative while on the unit, pt  Does report auditory hallucinations, pt reports that they are his family members talking about his father's train set  Pt has been letting his needs be known to others  Pt is currently resting in bed

## 2018-07-03 NOTE — PROGRESS NOTES
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)    Unspecified mood (affective) disorder (HCC)    Amphetamine abuse      Behavior over the last 24 hours:  Unchanged  Staff reports that the patient isolative, with limited peer interaction  He has not been attending the most therapeutic groups in the unit  He is withdrawn to his room, but does come out for meals  Initially this morning, he reported to staff that the voices were nicer    When I met him this morning, he stated that the voices were initially less when he woke up this morning, but were increasing gradually  He states that [employer] voices are bothering me and ganging up on me again    He is sleeping well at night and he is tolerating medications well  He is not reporting a significant improvement in his symptoms at this time  Sleep: normal  Appetite: normal  Medication side effects: No  ROS: no complaints    Mental Status Evaluation:  Appearance:  disheveled   Behavior:  Cooperative   Speech:  soft   Mood:  anxious   Affect:  constricted   Thought Process:  Blocked versus poverty of thoughts   Thought Content:  Paranoid delusions   Perceptual Disturbances: Auditory hallucinations without commands   Risk Potential: Denies SI/HI   Sensorium:  person, place, time/date and situation   Cognition:  grossly intact   Consciousness:  alert and awake    Attention: attention span appeared shorter than expected for age   Insight:  limited   Judgment: limited   Gait/Station: normal gait/station   Motor Activity: no abnormal movements     Progress Toward Goals: Ongoing    Recommended Treatment: Continue with group therapy, milieu therapy and occupational therapy        Risks, benefits and possible side effects of Medications:   Risks, benefits, and possible side effects of medications explained to patient and patient verbalizes understanding  Medications:   all current active meds have been reviewed, continue current psychiatric medications, current meds:   Current Facility-Administered Medications   Medication Dose Route Frequency    acetaminophen (TYLENOL) tablet 650 mg  650 mg Oral Q4H PRN    aluminum-magnesium hydroxide-simethicone (MYLANTA) 200-200-20 mg/5 mL oral suspension 30 mL  30 mL Oral Q4H PRN    benztropine (COGENTIN) tablet 1 mg  1 mg Oral BID    carBAMazepine (TEGretol) tablet 200 mg  200 mg Oral Daily    chlorproMAZINE (THORAZINE) injection SOLN 25 mg  25 mg Intramuscular Q6H PRN    chlorproMAZINE (THORAZINE) tablet 25 mg  25 mg Oral Q6H PRN    chlorproMAZINE (THORAZINE) tablet 50 mg  50 mg Oral BID    diphenhydrAMINE (BENADRYL) injection 50 mg  50 mg Intramuscular Q4H PRN    diphenhydrAMINE (BENADRYL) tablet 50 mg  50 mg Oral Q4H PRN    levothyroxine tablet 50 mcg  50 mcg Oral Early Morning    magnesium hydroxide (MILK OF MAGNESIA) 400 mg/5 mL oral suspension 30 mL  30 mL Oral Daily PRN    nicotine (NICODERM CQ) 21 mg/24 hr TD 24 hr patch 21 mg  21 mg Transdermal Daily    QUEtiapine (SEROquel) tablet 100 mg  100 mg Oral BID (AM & Afternoon)    QUEtiapine (SEROquel) tablet 300 mg  300 mg Oral HS    traZODone (DESYREL) tablet 50 mg  50 mg Oral HS PRN   Planned medication changes: Increase total daily dose of Seroquel to 500mg and chlorpromazine to 100mg  Labs: Reviewed  Counseling / Coordination of Care  He is to participate in therapeutic groups offered on the unit  Total floor / unit time spent today 25 minutes  Greater than 50% of total time was spent with the patient and / or family counseling and / or coordination of care   A description of the counseling / coordination of care: 25

## 2018-07-03 NOTE — PLAN OF CARE
Problem: Ineffective Coping  Goal: Participates in unit activities  Interventions:  - Provide therapeutic environment   - Provide required programming   - Redirect inappropriate behaviors    Outcome: Not Progressing  PT has been refusing to attend Art Therapy groups and isolates self to his room  Pt is pleasant with both staff and peers  Pt will continue to be encouraged to attend Art Therapy groups to increase + coping skills, increase + thinking, and increase self-awareness  Pt will be encouraged to engage in Art Therapy directives to allow for creative expression of thoughts and emotions

## 2018-07-03 NOTE — PLAN OF CARE
Problem: Anxiety  Goal: Anxiety is at manageable level  Interventions:  - Assess and monitor patient's anxiety level  - Monitor for signs and symptoms of anxiety both physical and emotional (heart palpitations, chest pain, shortness of breath, headaches, nausea, feeling jumpy, restlessness, irritable, apprehensive)  - Collaborate with interdisciplinary team and initiate plan and interventions as ordered    - [place] patient to unit/surroundings  - Explain treatment plan  - Encourage participation in care  - Encourage verbalization of concerns/fears  - Identify coping mechanisms  - Assist in developing anxiety-reducing skills  - Administer/offer alternative therapies  - Limit or eliminate stimulants   Outcome: Progressing      Problem: Risk for Violence/Aggression Toward Others  Goal: Treatment Goal: Refrain from acts of violence/aggression during length of stay, and demonstrate improved impulse control at the time of discharge  Outcome: Progressing    Goal: Refrain from destructive acts on the environment or property  Outcome: Progressing    Goal: Control angry outbursts  Interventions:  - Monitor patient closely, per order  - Ensure early verbal de-escalation  - Monitor prn medication needs  - Set reasonable/therapeutic limits, outline behavioral expectations, and consequences   - Provide a non-threatening milieu, utilizing the least restrictive interventions    Outcome: Progressing    Goal: Identify appropriate positive anger management techniques  Interventions:  - Offer anger management and coping skills groups   - Staff will provide positive feedback for appropriate anger control   Outcome: Progressing      Problem: DISCHARGE PLANNING - CARE MANAGEMENT  Goal: Discharge to post-acute care or home with appropriate resources  INTERVENTIONS:  - Conduct assessment to determine patient/family and health care team treatment goals, and need for post-acute services based on payer coverage, community resources, and patient preferences, and barriers to discharge  - Address psychosocial, clinical, and financial barriers to discharge as identified in assessment in conjunction with the patient/family and health care team  - Arrange appropriate level of post-acute services according to patient's   needs and preference and payer coverage in collaboration with the physician and health care team  - Communicate with and update the patient/family, physician, and health care team regarding progress on the discharge plan  - Arrange appropriate transportation to post-acute venues   Outcome: Progressing      Problem: Ineffective Coping  Goal: Participates in unit activities  Interventions:  - Provide therapeutic environment   - Provide required programming   - Redirect inappropriate behaviors    Outcome: Progressing      Problem: PSYCHOSIS  Goal: Will report no hallucinations or delusions  Interventions:  - Administer medication as  ordered  - Every waking shifts and PRN assess for the presence of hallucinations and or delusions  - Assist with reality testing to support increasing orientation  - Assess if patient's hallucinations or delusions are encouraging self-harm or harm to others and intervene as appropriate   Outcome: Progressing

## 2018-07-04 PROCEDURE — 99232 SBSQ HOSP IP/OBS MODERATE 35: CPT | Performed by: PHYSICIAN ASSISTANT

## 2018-07-04 RX ADMIN — BENZTROPINE MESYLATE 1 MG: 1 TABLET ORAL at 08:41

## 2018-07-04 RX ADMIN — NICOTINE 21 MG: 21 PATCH, EXTENDED RELEASE TRANSDERMAL at 08:48

## 2018-07-04 RX ADMIN — CHLORPROMAZINE HYDROCHLORIDE 50 MG: 25 TABLET, SUGAR COATED ORAL at 17:53

## 2018-07-04 RX ADMIN — CHLORPROMAZINE HYDROCHLORIDE 50 MG: 25 TABLET, SUGAR COATED ORAL at 08:41

## 2018-07-04 RX ADMIN — CARBAMAZEPINE 200 MG: 200 TABLET ORAL at 08:41

## 2018-07-04 RX ADMIN — QUETIAPINE FUMARATE 100 MG: 100 TABLET ORAL at 14:11

## 2018-07-04 RX ADMIN — QUETIAPINE FUMARATE 300 MG: 200 TABLET ORAL at 21:40

## 2018-07-04 RX ADMIN — BENZTROPINE MESYLATE 1 MG: 1 TABLET ORAL at 21:39

## 2018-07-04 RX ADMIN — QUETIAPINE FUMARATE 100 MG: 100 TABLET ORAL at 08:41

## 2018-07-04 RX ADMIN — LEVOTHYROXINE SODIUM 50 MCG: 25 TABLET ORAL at 06:19

## 2018-07-04 NOTE — PROGRESS NOTES
Pt has been withdrawn to his room with the exception of medication and meals  Pt does report hearing voices today, pt verbalizes, " I know it's my brothers and they are pissing me off " pt was reminded that if he did need any help today to let this nurse or other staff know  Pt has been reporting that it is too early to tell if the medication is helping

## 2018-07-04 NOTE — PROGRESS NOTES
With receipt of PRN thorazine 25mg along with scheduled HS medication, pt has been sleeping soundly through the night  No periods of waking or signs of concern have been noted    Will continue to monitor and assess for changes/safety with 15 minute checks

## 2018-07-04 NOTE — PROGRESS NOTES
Pt has been present in the community for evening group and snack  Has been among peers but withdrawn to self  Presents for assessment flat, internally preoccupied  Eye contact fleeting  Pt reports he is having, "a bad day" stating, "the voices are really pissing me off"  When asked to describe the voices pt reports that his brothers and their girlfriends won't leave him alone  Pt was unable to identify coping mechanisms with exception of "trying to sleep it off"  Pt was encouraged to utilize distraction techniques ie exercise/groups  Pt did poorly with reality orientation  HS medications were administered early d/t pt c/o hallucinations-pt aware of increased seroquel dose of 300mg  PRN thorazine 25mg also administered by pts request for active hallucinations  Pt is able to make needs known and agrees to report changes in condition/needs to staff  Nursing will continue to monitor and assess for changes and safety with 15 minute checks

## 2018-07-04 NOTE — PLAN OF CARE
Agree to be compliant with medication regime, as prescribed and report medication side effects Completed      Pt has been med compliant throughout most of stay  Continues to be agreeable to all regimen changes and is hopeful for finding a regimen that is effective for his psychosis  Pt has stated he plans to remain medication compliant following d/c to "keep the voices away"  Participates in unit activities Not Progressing      Pt has shown small improvement in visibility in the milieu and attendance to unit programming, but does remain mostly withdrawn to room  Nursing will continue to encourage pt's participation in programming    Will report no hallucinations or delusions Not Progressing      Pt continues to report active auditory hallucinations  Pt often responds to this internal stimuli, even when in the presence of staff   Nursing encourages pt to report changes in hallucinations and contract for unit safety

## 2018-07-04 NOTE — PROGRESS NOTES
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)    Unspecified mood (affective) disorder (HCC)    Amphetamine abuse      Behavior over the last 24 hours:  He is tolerating the increase in medication doses thus far  He reports that the voices are bad this morning; patient was seen approximately 30 minutes after he received morning medications  He states that "my brothers are pissing me off " Staff reports that he does attend some groups  He is visible on the unit and is cooperative and polite to staff  Sleep: normal  Appetite: normal  Medication side effects: No  ROS: no complaints    Mental Status Evaluation:  Appearance:  age appropriate and disheveled   Behavior:  cooperative   Speech:  soft   Mood:  anxious and constricted   Affect:  constricted   Thought Process:  blocked   Thought Content:  delusions  persecutory   Perceptual Disturbances: Auditory hallucinations without commands   Risk Potential: deinies current SI/HI   Sensorium:  person, place, time/date and situation   Cognition:  grossly intact   Consciousness:  alert and awake    Attention: attention span appeared shorter than expected for age   Insight:  limited   Judgment: limited   Gait/Station: normal gait/station   Motor Activity: no abnormal movements     Progress Toward Goals: Ongoing    Recommended Treatment: Continue with group therapy, milieu therapy and occupational therapy  Risks, benefits and possible side effects of Medications:   Risks, benefits, and possible side effects of medications explained to patient and patient verbalizes understanding        Medications:   all current active meds have been reviewed, continue current psychiatric medications, current meds:   Current Facility-Administered Medications   Medication Dose Route Frequency    acetaminophen (TYLENOL) tablet 650 mg  650 mg Oral Q4H PRN    aluminum-magnesium hydroxide-simethicone (MYLANTA) 200-200-20 mg/5 mL oral suspension 30 mL  30 mL Oral Q4H PRN    benztropine (COGENTIN) tablet 1 mg  1 mg Oral BID    carBAMazepine (TEGretol) tablet 200 mg  200 mg Oral Daily    chlorproMAZINE (THORAZINE) injection SOLN 25 mg  25 mg Intramuscular Q6H PRN    chlorproMAZINE (THORAZINE) tablet 25 mg  25 mg Oral Q6H PRN    chlorproMAZINE (THORAZINE) tablet 50 mg  50 mg Oral BID    diphenhydrAMINE (BENADRYL) injection 50 mg  50 mg Intramuscular Q4H PRN    diphenhydrAMINE (BENADRYL) tablet 50 mg  50 mg Oral Q4H PRN    levothyroxine tablet 50 mcg  50 mcg Oral Early Morning    magnesium hydroxide (MILK OF MAGNESIA) 400 mg/5 mL oral suspension 30 mL  30 mL Oral Daily PRN    nicotine (NICODERM CQ) 21 mg/24 hr TD 24 hr patch 21 mg  21 mg Transdermal Daily    QUEtiapine (SEROquel) tablet 100 mg  100 mg Oral BID (AM & Afternoon)    QUEtiapine (SEROquel) tablet 300 mg  300 mg Oral HS    traZODone (DESYREL) tablet 50 mg  50 mg Oral HS PRN    and will monitor response to recent medication dose increases  Labs: Reviewed    Counseling / Coordination of Care  Total floor / unit time spent today 15 minutes  Greater than 50% of total time was spent with the patient and / or family counseling and / or coordination of care   A description of the counseling / coordination of care: 15

## 2018-07-05 PROCEDURE — 99232 SBSQ HOSP IP/OBS MODERATE 35: CPT | Performed by: NURSE PRACTITIONER

## 2018-07-05 RX ADMIN — CHLORPROMAZINE HYDROCHLORIDE 50 MG: 25 TABLET, SUGAR COATED ORAL at 08:40

## 2018-07-05 RX ADMIN — QUETIAPINE FUMARATE 300 MG: 200 TABLET ORAL at 20:21

## 2018-07-05 RX ADMIN — QUETIAPINE FUMARATE 100 MG: 100 TABLET ORAL at 13:46

## 2018-07-05 RX ADMIN — CARBAMAZEPINE 200 MG: 200 TABLET ORAL at 08:40

## 2018-07-05 RX ADMIN — BENZTROPINE MESYLATE 1 MG: 1 TABLET ORAL at 20:20

## 2018-07-05 RX ADMIN — QUETIAPINE FUMARATE 100 MG: 100 TABLET ORAL at 08:40

## 2018-07-05 RX ADMIN — BENZTROPINE MESYLATE 1 MG: 1 TABLET ORAL at 08:40

## 2018-07-05 RX ADMIN — CHLORPROMAZINE HYDROCHLORIDE 50 MG: 25 TABLET, SUGAR COATED ORAL at 17:17

## 2018-07-05 RX ADMIN — CHLORPROMAZINE HYDROCHLORIDE 25 MG: 25 TABLET, SUGAR COATED ORAL at 20:21

## 2018-07-05 NOTE — PROGRESS NOTES
Pt presents as flat and WD to self  When this nurse walked into pt's room to give him AM meds, he was in his bed laughing to himself  Asked pt if he was OK and he stated "ya "  Pt internally preoccupied  Pt admits to having AH  Pt states the AH are of his brothers  Pt states they are more controlled and he tries to ignore them  Pt denies agitation  Denies SI/HI at this time  No elopement issues at this time  Pt compliant and cooperative with staff  Denies any pain  Will continue Q 15 min checks to maintain pt safety

## 2018-07-05 NOTE — PROGRESS NOTES
Progress Note - [address] 33 y o  male MRN: [id number]   Unit/Bed#: [facility] 220-02 Encounter: [id number]    Behavior over the last 24 hours:  Raul Soni was calm, pleasant, and cooperative during the interview  Nursing staff reports that he has been eating 100% of meals and has been sleeping without difficulty  He denies any side effects from medications  He has had no behavioral issues in the past 24 hours and is participating in groups and interacting appropriately with staff and peers  He describes  his current mood as "fine"  He relates he is still hearing the voices of his brothers and he does not believe his medications are helping despite the recent increase in dosages  Per nursing staff, he remains isolative and internally preoccupied  He is refusing to take his synthroid because he thinks it will make him gain weight  Importance of thyroid level control was discussed  Patient was encouraged to take his medication as prescribed and agreed at time of interview  ROS: no complaints    Mental Status Evaluation:    Appearance:  disheveled   Behavior:  pleasant, cooperative, calm   Speech:  normal rate, normal volume   Mood:  euthymic   Affect:  normal range and intensity   Thought Process:  organized, logical, coherent   Associations: intact associations   Thought Content:  persecutory delusions   Perceptual Disturbances:  Auditory hallucinations (non-command)   Risk Potential: Suicidal ideation - None at present  Homicidal ideation - None  Potential for aggression - No   Sensorium:  oriented to person, place, time/date and situation   Memory:  recent and remote memory grossly intact   Consciousness:  alert and awake   Attention: attention span and concentration appear shorter than expected for age   Insight:  poor   Judgment: poor   Gait/Station: normal gait/station   Motor Activity: no abnormal movements     Vital signs in last 24 hours:    Temp:  [97 °F (36 1 °C)-97 1 °F (36 2 °C)] 97 1 °F (36 2 °C)  HR:  [] 79  Resp:  [16] 16  BP: (104-134)/(58-71) 134/71    Laboratory results:  I have personally reviewed all pertinent laboratory/tests results  Progress Toward Goals: mood is stabilizing    Assessment/Plan   Principal Problem:    Schizoaffective disorder, bipolar type (HCC)  Active Problems:    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)    Unspecified mood (affective) disorder (HCC)    Amphetamine abuse    Recommended Treatment:   1  Continue current medications and treatment plan as prescribed  2  Consider alternative antipsychotic if no response to current medications  3  Will obtain serum Tegretol level  4  Will continue to encourage patient to remain interactive with peers and staff and participate in groups as tolerated in order to receive maximum benefit from the therapeutic milieu  All current active medications have been reviewed      Current Facility-Administered Medications:  acetaminophen 650 mg Oral Q4H PRN Provider Cutover   aluminum-magnesium hydroxide-simethicone 30 mL Oral Q4H PRN Provider Cutover   benztropine 1 mg Oral BID Provider Cutover   carBAMazepine 200 mg Oral Daily Quang Sarabia PA-C   chlorproMAZINE 25 mg Intramuscular Q6H PRN Provider Cutover   chlorproMAZINE 25 mg Oral Q6H PRN Provider Cutover   chlorproMAZINE 50 mg Oral BID BRIDGET Kuhn   diphenhydrAMINE 50 mg Intramuscular Q4H PRN Provider Cutover   diphenhydrAMINE 50 mg Oral Q4H PRN Provider Cutover   levothyroxine 50 mcg Oral Early Morning Provider Cutover   magnesium hydroxide 30 mL Oral Daily PRN Provider Cutover   nicotine 21 mg Transdermal Daily Provider Cutover   QUEtiapine 100 mg Oral BID (AM & Afternoon) BRIDGET Li   QUEtiapine 300 mg Oral HS BRIDGET Kuhn   traZODone 50 mg Oral HS PRN Provider Cutover       Risks / Benefits of Treatment:    Risks, benefits, and possible side effects of medications explained to patient and patient verbalizes understanding and agreement for treatment  Counseling / Coordination of Care:      Patient's progress discussed with staff in treatment team meeting  Medications, treatment progress and treatment plan reviewed with patient

## 2018-07-05 NOTE — PLAN OF CARE
Problem: Nutrition/Hydration-ADULT  Goal: Nutrient/Hydration intake appropriate for improving, restoring or maintaining nutritional needs  Monitor and assess patient's nutrition/hydration status for malnutrition (ex- brittle hair, bruises, dry skin, pale skin and conjunctiva, muscle wasting, smooth red tongue, and disorientation)  Collaborate with interdisciplinary team and initiate plan and interventions as ordered  Monitor patient's weight and dietary intake as ordered or per policy  Utilize nutrition screening tool and intervene per policy  Determine patient's food preferences and provide high-protein, high-caloric foods as appropriate  INTERVENTIONS:  - Monitor oral intake, urinary output, labs, and treatment plans  - Assess nutrition and hydration status and recommend course of action  - Evaluate amount of meals eaten  - Assist patient with eating if necessary   - Allow adequate time for meals  - Recommend/ encourage appropriate diets, oral nutritional supplements, and vitamin/mineral supplements  - Order, calculate, and assess calorie counts as needed  - Recommend, monitor, and adjust tube feedings and TPN/PPN based on assessed needs  - Assess need for intravenous fluids  - Provide specific nutrition/hydration education as appropriate  - Include patient/family/caregiver in decisions related to nutrition  Outcome: Progressing  He is on a regular double portions with ensure at meals  His intake is listed at 100 %  No new labs RDN will evaluate his labs when available  His weight did increase from 190 on 6-30 up 5 # to 195 on 7-5 which was desirable  His skin is intact  He was taking his breakfast on rounds this am and he stated he is enjoying his ensure  He is on a nicotine patch   RDN to reassess his nutrition needs at low risk and follow PRN with his team

## 2018-07-05 NOTE — PROGRESS NOTES
Pt has been visible in the community this shift  Pt has attended all evening programming and has been present among peers, but does remain quiet and withdrawn to self  On approach, pt is polite but superficial  Good eye contact  Eduin Gordon, but oral hygiene is poorly maintained  Remains internally preoccupied and reporting auditory hallucinations  Pt states he had a "rough day" but informed, "I am doing better now that I am out and about"  Positive reinforcement offered  Pt denies acute concerns  Was compliant with HS medication  Made no request for PRN thorazine  Pt is able to make needs known and agrees to report changes in condition/needs to staff  Nursing will continue to monitor and assess for changes and safety with 15 minute checks

## 2018-07-05 NOTE — PROGRESS NOTES
Pt woke at approx 0545 to check the time  Pt was offered am synthroid  Pt shared that he feels he has gained too much weight while here and he feels it is because of the synthroid  Education offered but pt continued to refuse  Pt was approached again later regarding synthroid  Education offered along with reminder about weight gain while taking seroquel  Pt stated, "No, I just will skip it (synthroid) for a few days and see if I lose any weight, if not I'll take it again"  Because pt had been heard laughing alone in room, hallucinations were assessed  Pt informed they are still there but that, "I am waking up in a good mood"  Pt was agreeable to seeking out staff should the hallucinations become more bothersome   Will continue to monitor

## 2018-07-05 NOTE — PROGRESS NOTES
SW met with patient  Pt reports,"I feel like I can control the voices better"  Denies current SI/HI/VH  Pt reports hearing voices but has better control  SW discussed with patient his outpatient services; to be seen by Amparo Lynn after discharge and medication management services will be scheduled at that appointment

## 2018-07-05 NOTE — PROGRESS NOTES
Progress Note - [address] [facility] 33 y o  male MRN: [id number]   Unit/Bed#: U 220-02 Encounter: [id number]    Behavior over the last 24 hours:  Elizabeth Valenzuela was calm, pleasant, and cooperative during the interview  Nursing staff reports that he has been eating 100% of meals and has been sleeping without difficulty  He denies any side effects from medications  He has had no behavioral issues in the past 24 hours and is participating in groups and interacting appropriately with staff and peers  He has been taking medications as prescribed and rates his current mood as "good"  He denies any psychiatric symptoms and offer no complaints  He wishes to speak to  today to discuss future plans for discharge  ROS: no complaints    Mental Status Evaluation:    Appearance:  dressed in hospital attire   Behavior:  pleasant, cooperative, calm   Speech:  normal pitch, rate, and volume   Mood:  normal   Affect:  blunted   Thought Process:  organized, logical, coherent, goal directed   Associations: intact associations   Thought Content:  normal, no overt delusions   Perceptual Disturbances: no auditory hallucinations, no visual hallucinations   Risk Potential: Suicidal ideation - None  Homicidal ideation - None  Potential for aggression - No   Sensorium:  oriented to person, place, time/date and situation   Memory:  recent and remote memory grossly intact   Consciousness:  alert and awake   Attention: attention span and concentration are age appropriate   Insight:  fair   Judgment: poor   Gait/Station: normal gait/station   Motor Activity: no abnormal movements     Vital signs in last 24 hours:    Temp:  [97 °F (36 1 °C)-97 1 °F (36 2 °C)] 97 1 °F (36 2 °C)  HR:  [] 79  Resp:  [16] 16  BP: (104-134)/(58-71) 134/71    Laboratory results:  I have personally reviewed all pertinent laboratory/tests results      Progress Toward Goals: progressing    Assessment/Plan   Principal Problem: Schizoaffective disorder, bipolar type ([facility] [address] )  Active Problems:    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)    Unspecified mood (affective) disorder (HCC)    Amphetamine abuse    Recommended Treatment:   1  Will continue current medications and treatment plan as prescribed  2  Will continue to monitor patient regarding medication compliance and adjust medications and treatment plan as needed  3  Will continue to encourage patient to participate in groups and remain interactive with staff and peers in order to receive maximum benefit from the therapeutic milieu  4  Discharge disposition and planning are ongoing  All current active medications have been reviewed  Current Facility-Administered Medications:  acetaminophen 650 mg Oral Q4H PRN Provider Cutover   aluminum-magnesium hydroxide-simethicone 30 mL Oral Q4H PRN Provider Cutover   benztropine 1 mg Oral BID Provider Cutover   carBAMazepine 200 mg Oral Daily Quang Sarabia PA-C   chlorproMAZINE 25 mg Intramuscular Q6H PRN Provider Cutover   chlorproMAZINE 25 mg Oral Q6H PRN Provider Cutover   chlorproMAZINE 50 mg Oral BID BRIDGET Kuhn   diphenhydrAMINE 50 mg Intramuscular Q4H PRN Provider Cutover   diphenhydrAMINE 50 mg Oral Q4H PRN Provider Cutover   levothyroxine 50 mcg Oral Early Morning Provider Cutover   magnesium hydroxide 30 mL Oral Daily PRN Provider Cutover   nicotine 21 mg Transdermal Daily Provider Cutover   QUEtiapine 100 mg Oral BID (AM & Afternoon) BRIDGET Li   QUEtiapine 300 mg Oral HS BRIDGET Kuhn   traZODone 50 mg Oral HS PRN Provider Cutover       Risks / Benefits of Treatment:    Risks, benefits, and possible side effects of medications explained to patient and patient verbalizes understanding and agreement for treatment  Counseling / Coordination of Care:      Patient's progress discussed with staff in treatment team meeting    Medications, treatment progress and treatment plan reviewed with patient

## 2018-07-05 NOTE — PLAN OF CARE
Alteration in Thoughts and Perception     Verbalize thoughts and feelings Completed     Attend and participate in unit activities, including therapeutic, recreational, and educational groups Completed        Ineffective Coping     Participates in unit activities Completed

## 2018-07-05 NOTE — PLAN OF CARE
Problem: Ineffective Coping  Goal: Participates in unit activities  Interventions:  - Provide therapeutic environment   - Provide required programming   - Redirect inappropriate behaviors    Outcome: Progressing  PT has mostly been isolative to PT room, however he did come out to the morning art therapy group today, where he presented with a bright mood and affect, and actively engaged in the group directive  PT maintained focus for most of the group and did share during group processing   PT will continue to attend unit programming to express feelings and emotions

## 2018-07-05 NOTE — PLAN OF CARE
Complete daily ADLs, including personal hygiene independently, as able Completed      Control angry outbursts Completed      Identify appropriate positive anger management techniques Completed      Pt has completed the above listed goals by utilizing effective means of coping to manage anger/agitation related to psychosis  Pt has used exercise and increased involvement in the community to distract self from irritating hallucinations  Will report no hallucinations or delusions Not Progressing      Pt continues to report active auditory hallucinations of familiar voices  Pt frequently reports being "pissed off" by such  Is redirectable and agreeable to distraction techniques and communication, as encouraged by nursing       Attend and participate in unit activities, including therapeutic, recreational, and educational groups Progressing      Participates in unit activities Progressing      Pt has become more involved in the milieu and programming

## 2018-07-06 LAB — CARBAMAZEPINE SERPL-MCNC: 3.9 UG/ML (ref 4–12)

## 2018-07-06 PROCEDURE — 99232 SBSQ HOSP IP/OBS MODERATE 35: CPT | Performed by: PSYCHIATRY & NEUROLOGY

## 2018-07-06 PROCEDURE — 80156 ASSAY CARBAMAZEPINE TOTAL: CPT | Performed by: NURSE PRACTITIONER

## 2018-07-06 RX ORDER — HALOPERIDOL 5 MG
5 TABLET ORAL 2 TIMES DAILY
Status: DISCONTINUED | OUTPATIENT
Start: 2018-07-06 | End: 2018-07-07

## 2018-07-06 RX ADMIN — CHLORPROMAZINE HYDROCHLORIDE 50 MG: 25 TABLET, SUGAR COATED ORAL at 08:45

## 2018-07-06 RX ADMIN — CHLORPROMAZINE HYDROCHLORIDE 50 MG: 25 TABLET, SUGAR COATED ORAL at 17:40

## 2018-07-06 RX ADMIN — BENZTROPINE MESYLATE 1 MG: 1 TABLET ORAL at 22:04

## 2018-07-06 RX ADMIN — HALOPERIDOL 5 MG: 5 TABLET ORAL at 13:15

## 2018-07-06 RX ADMIN — HALOPERIDOL 5 MG: 5 TABLET ORAL at 17:40

## 2018-07-06 RX ADMIN — BENZTROPINE MESYLATE 1 MG: 1 TABLET ORAL at 08:45

## 2018-07-06 RX ADMIN — QUETIAPINE FUMARATE 100 MG: 100 TABLET ORAL at 08:45

## 2018-07-06 RX ADMIN — CARBAMAZEPINE 200 MG: 200 TABLET ORAL at 08:45

## 2018-07-06 NOTE — PLAN OF CARE
Alteration in Thoughts and Perception     Treatment Goal: Gain control of psychotic behaviors/thinking, reduce/eliminate presenting symptoms and demonstrate improved reality functioning upon discharge Not Progressing     Recognize dysfunctional thoughts, communicate reality-based thoughts at the time of discharge Not [address] Discharge to post-acute care or home with appropriate resources Not Progressing        PSYCHOSIS     Will report no hallucinations or delusions Not Progressing          Alteration in Thoughts and Perception     Refrain from acting on delusional thinking/internal stimuli Progressing        Anxiety     Anxiety is at manageable level Progressing        Ineffective Coping     Participates in unit activities Progressing        Nutrition/Hydration-ADULT     Nutrient/Hydration intake appropriate for improving, restoring or maintaining nutritional needs Progressing        Risk for Violence/Aggression Toward Others     Treatment Goal: Refrain from acts of violence/aggression during length of stay, and demonstrate improved impulse control at the time of discharge Progressing     Refrain from destructive acts on the environment or property Progressing

## 2018-07-06 NOTE — PROGRESS NOTES
On approach by MHT for am blood draw for tegretol level, pt refused and stated, "Not right now, maybe later"  On approach by this writer, pt again refused  Pt was informed of protocol for drawing blood in the early am though pt made no reply  Will endorse such to day shift nursing  Pt also refused 0600 synthroid, still believing it is causing him weight gain-pt evasive with education
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
## 2018-07-06 NOTE — PROGRESS NOTES
With the use of PRN thorazine at the start of the night, Pt is and has been sleeping soundly through the night  No periods of waking have been observed  No acute concerns have been noted  15 minute checks have been maintained incident free   Will continue to monitor

## 2018-07-06 NOTE — PROGRESS NOTES
Patient alert awake, ate breakfast with peers, back to bed after eating  Patient did allow this nurse to draw tegretol level but with resistance  Patient states "I just don't like needles and they keep taking my blood   This information passed to providers and tegretol to be discontinued eventually  Pt also declined to take thyroid medication related to weight gain and states "I will go back on it if I still dont lose the weight"  Pt c/o paranoid, non command  auditory hallucinations and denies si hi  Will maintain on safety precautions and 15 minute checks  No needs identified

## 2018-07-06 NOTE — PLAN OF CARE
Problem: Ineffective Coping  Goal: Participates in unit activities  Interventions:  - Provide therapeutic environment   - Provide required programming   - Redirect inappropriate behaviors     Outcome: Progressing  PT did not attend the morning art therapy group today  When prompted to attend group, PT reported that he was not feeling well physically and asked if he could rest for the morning  PT did state that if he is feeling better that he would make an attempt at coming out to the afternoon group  AT will continue to monitor and to encourage group participation

## 2018-07-06 NOTE — PROGRESS NOTES
Progress Note - [address] 33 y o  male MRN: [id number]  Unit/Bed#: [facility] 220-02 Encounter: [id number]    The patient was seen for continuing care and reviewed with treatment team   He is very much disturbed by hearing his brother's voice all the time  At times this interferes with him listening to other people talking  He has not improved on his current regimen of medications  According to staff, he was doing better on haloperidol but he was taken off of it by another psychiatrist   He is not responding to chlorpromazine or Seroquel  Mental Status Evaluation:  Appearance:  Adequate hygiene and grooming and Good eye contact   Behavior:  calm and cooperative   Mood:  anxious and dysphoric   Affect: blunted and constricted   Speech: Normal rate and Normal volume   Thought Process:  Goal directed and coherent   Thought Content:  Does not verbalize delusional material   Perceptual Disturbances: Auditory hallucinations without commands   Risk Potential: No suicidal or homicidal ideation   Attention/Concentration attention span appeared shorter than expected for age   Orientation:      Gait/Station: normal gait/station and normal balance   Motor Activity: No abnormal movement noted     Progress Toward Goals:     Assessment/Plan    Principal Problem:    Schizoaffective disorder, bipolar type (HCC)  Active Problems:    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)    Unspecified mood (affective) disorder (HCC)    Amphetamine abuse      Recommended Treatment: We will start the patient on haloperidol and continue with Thorazine for now Continue with pharmacotherapy, group therapy, milieu therapy and occupational therapy    The patient will be maintained on the following medications:    Current Facility-Administered Medications:  acetaminophen 650 mg Oral Q4H PRN Provider Cutover   aluminum-magnesium hydroxide-simethicone 30 mL Oral Q4H PRN Provider [facility] benztropine 1 mg Oral BID Provider Cutover   carBAMazepine 200 mg Oral Daily Farhana Jama PA-C   chlorproMAZINE 25 mg Intramuscular Q6H PRN Provider Cutover   chlorproMAZINE 25 mg Oral Q6H PRN Provider Cutover   chlorproMAZINE 50 mg Oral BID BRIDGET Kuhn   diphenhydrAMINE 50 mg Intramuscular Q4H PRN Provider Cutover   diphenhydrAMINE 50 mg Oral Q4H PRN Provider Cutover   levothyroxine 50 mcg Oral Early Morning Provider Cutover   magnesium hydroxide 30 mL Oral Daily PRN Provider Cutover   nicotine 21 mg Transdermal Daily Provider Cutover   QUEtiapine 100 mg Oral BID (AM & Afternoon) BRIDGET Van   QUEtiapine 300 mg Oral HS BRIDGET Kuhn   traZODone 50 mg Oral HS PRN Provider Cutover       Risks, benefits and possible side effects of Medications:   Risks, benefits, and possible side effects of medications explained to patient and patient verbalizes understanding

## 2018-07-07 PROCEDURE — 99232 SBSQ HOSP IP/OBS MODERATE 35: CPT | Performed by: NURSE PRACTITIONER

## 2018-07-07 RX ORDER — HALOPERIDOL 5 MG
5 TABLET ORAL 3 TIMES DAILY
Status: DISCONTINUED | OUTPATIENT
Start: 2018-07-07 | End: 2018-07-07

## 2018-07-07 RX ORDER — HALOPERIDOL 5 MG
5 TABLET ORAL 3 TIMES DAILY
Status: DISCONTINUED | OUTPATIENT
Start: 2018-07-07 | End: 2018-07-09

## 2018-07-07 RX ORDER — CHLORPROMAZINE HYDROCHLORIDE 25 MG/1
50 TABLET, FILM COATED ORAL DAILY
Status: DISCONTINUED | OUTPATIENT
Start: 2018-07-08 | End: 2018-07-11

## 2018-07-07 RX ADMIN — HALOPERIDOL 5 MG: 5 TABLET ORAL at 17:32

## 2018-07-07 RX ADMIN — CARBAMAZEPINE 200 MG: 200 TABLET ORAL at 08:08

## 2018-07-07 RX ADMIN — HALOPERIDOL 5 MG: 5 TABLET ORAL at 21:11

## 2018-07-07 RX ADMIN — BENZTROPINE MESYLATE 1 MG: 1 TABLET ORAL at 21:11

## 2018-07-07 RX ADMIN — DIPHENHYDRAMINE HCL 50 MG: 25 TABLET ORAL at 17:37

## 2018-07-07 RX ADMIN — LEVOTHYROXINE SODIUM 50 MCG: 25 TABLET ORAL at 05:36

## 2018-07-07 RX ADMIN — BENZTROPINE MESYLATE 1 MG: 1 TABLET ORAL at 08:08

## 2018-07-07 RX ADMIN — CHLORPROMAZINE HYDROCHLORIDE 50 MG: 25 TABLET, SUGAR COATED ORAL at 08:08

## 2018-07-07 RX ADMIN — HALOPERIDOL 5 MG: 5 TABLET ORAL at 08:08

## 2018-07-07 NOTE — PROGRESS NOTES
Radha Tapia continues to present as paranoid  Admits to ongoing auditory hallucination  Withdrawn to room after 1930  Maintained on Q 15 minute safety checks  No acting out, suicidal ideations, and/or homicidal behaviors  No changes in medical condition or current complaints noted  Remains a fall risk  Fluids maintained at bedside to promote hydration

## 2018-07-07 NOTE — PROGRESS NOTES
No violent behaviors noted  Patient observed sleeping during most Q15 minute safety checks (second portion of shift)  No suicidal ideations, acting out or homicidal behaviors  No change in medical condition or complaints voiced  Remains a fall risk  Fluids maintained at bedside to promote hydration

## 2018-07-07 NOTE — PLAN OF CARE
Problem: Alteration in Thoughts and Perception  Goal: Treatment Goal: Gain control of psychotic behaviors/thinking, reduce/eliminate presenting symptoms and demonstrate improved reality functioning upon discharge  Outcome: Progressing    Goal: Refrain from acting on delusional thinking/internal stimuli  Interventions:  - Monitor patient closely, per order   - Utilize least restrictive measures   - Set reasonable limits, give positive feedback for acceptable   - Administer medications as ordered and monitor of potential side effects   Outcome: Progressing    Goal: Recognize dysfunctional thoughts, communicate reality-based thoughts at the time of discharge  Interventions:  - Provide medication and psycho-education to assist patient in compliance and developing insight into his/her illness    Outcome: Progressing      Problem: Anxiety  Goal: Anxiety is at manageable level  Interventions:  - Assess and monitor patient's anxiety level  - Monitor for signs and symptoms of anxiety both physical and emotional (heart palpitations, chest pain, shortness of breath, headaches, nausea, feeling jumpy, restlessness, irritable, apprehensive)  - Collaborate with interdisciplinary team and initiate plan and interventions as ordered    - [place] patient to unit/surroundings  - Explain treatment plan  - Encourage participation in care  - Encourage verbalization of concerns/fears  - Identify coping mechanisms  - Assist in developing anxiety-reducing skills  - Administer/offer alternative therapies  - Limit or eliminate stimulants   Outcome: Progressing      Problem: Risk for Violence/Aggression Toward Others  Goal: Treatment Goal: Refrain from acts of violence/aggression during length of stay, and demonstrate improved impulse control at the time of discharge  Outcome: Progressing    Goal: Refrain from destructive acts on the environment or property  Outcome: Progressing      Problem: DISCHARGE PLANNING - CARE MANAGEMENT  Goal: Discharge to post-acute care or home with appropriate resources  INTERVENTIONS:  - Conduct assessment to determine patient/family and health care team treatment goals, and need for post-acute services based on payer coverage, community resources, and patient preferences, and barriers to discharge  - Address psychosocial, clinical, and financial barriers to discharge as identified in assessment in conjunction with the patient/family and health care team  - Arrange appropriate level of post-acute services according to patient's   needs and preference and payer coverage in collaboration with the physician and health care team  - Communicate with and update the patient/family, physician, and health care team regarding progress on the discharge plan  - Arrange appropriate transportation to post-acute venues   Outcome: Progressing      Problem: Ineffective Coping  Goal: Participates in unit activities  Interventions:  - Provide therapeutic environment   - Provide required programming   - Redirect inappropriate behaviors     Outcome: Progressing      Problem: PSYCHOSIS  Goal: Will report no hallucinations or delusions  Interventions:  - Administer medication as  ordered  - Every waking shifts and PRN assess for the presence of hallucinations and or delusions  - Assist with reality testing to support increasing orientation  - Assess if patient's hallucinations or delusions are encouraging self-harm or harm to others and intervene as appropriate   Outcome: Progressing      Problem: Nutrition/Hydration-ADULT  Goal: Nutrient/Hydration intake appropriate for improving, restoring or maintaining nutritional needs  Monitor and assess patient's nutrition/hydration status for malnutrition (ex- brittle hair, bruises, dry skin, pale skin and conjunctiva, muscle wasting, smooth red tongue, and disorientation)  Collaborate with interdisciplinary team and initiate plan and interventions as ordered    Monitor patient's weight and dietary intake as ordered or per policy  Utilize nutrition screening tool and intervene per policy  Determine patient's food preferences and provide high-protein, high-caloric foods as appropriate       INTERVENTIONS:  - Monitor oral intake, urinary output, labs, and treatment plans  - Assess nutrition and hydration status and recommend course of action  - Evaluate amount of meals eaten  - Assist patient with eating if necessary   - Allow adequate time for meals  - Recommend/ encourage appropriate diets, oral nutritional supplements, and vitamin/mineral supplements  - Order, calculate, and assess calorie counts as needed  - Recommend, monitor, and adjust tube feedings and TPN/PPN based on assessed needs  - Assess need for intravenous fluids  - Provide specific nutrition/hydration education as appropriate  - Include patient/family/caregiver in decisions related to nutrition   Outcome: Progressing

## 2018-07-07 NOTE — PROGRESS NOTES
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)    Unspecified mood (affective) disorder (HCC)    Amphetamine abuse   This is a 25 min psychiatric progress note on patient  15 min were spent in coordination and care of treatment of this patient  Coordination of care consisted of meeting with the nursing staff discussed patient's progress, documentation, medication orders and behaviors over the last 24 hr   Per the nursing staff, the patient is eating 100% of his meals and supple through the night  The patient remains withdrawn to his room for most of the day  He still is experiencing auditory hallucinations not of a command nature  They are mostly of his family, especially his brothers, [place] Linus him or telling him what to do  He would like these auditory hallucination to be mostly eliminated before he is discharged  We discussed treatment options and we are continuing to titrate the dose of Haldol as we are reducing dose of Thorazine  The goal would be to get the Haldol dose up to at least 40 mg per day  Patient is tolerated 30 mg in the past   He had been on Tegretol but that has been discontinued because he declines any blood tests  For now, his mood is euthymic and not agitated or aggressive  Discharge planning in dispositions ongoing  Behavior over the last 24 hours:  unchanged  Sleep: normal  Appetite: normal  Medication side effects: No  ROS: no complaints    Mental Status Evaluation:  Appearance:  casually dressed   Behavior:  evasive and guarded   Speech:  soft   Mood:  constricted   Affect:  constricted   Thought Process:  circumstantial and tangential   Thought Content:  Paranoia   Perceptual Disturbances:  Auditory hallucinations without commands   Risk Potential: Potential for Aggression No   Sensorium:  person and place   Cognition:  impaired due to psychosis   Consciousness:  alert and awake    Attention: attention span appeared shorter than expected for age   Insight:  limited   Judgment: limited   Gait/Station: normal gait/station   Motor Activity: no abnormal movements     Progress Toward Goals: No improvement     Recommended Treatment: Continue with group therapy, milieu therapy and occupational therapy  Risks, benefits and possible side effects of Medications:   Risks, benefits, and possible side effects of medications explained to patient and patient verbalizes understanding  Medications: planned medication changes: increase Haldol and decrease Thorazine  Labs: Reviewed    Counseling / Coordination of Care  Total floor / unit time spent today 25 minutes  Greater than 50% of total time was spent with the patient and / or family counseling and / or coordination of care   A description of the counseling / coordination of care: 25

## 2018-07-07 NOTE — PLAN OF CARE
Problem: DISCHARGE PLANNING - CARE MANAGEMENT  Goal: Discharge to post-acute care or home with appropriate resources  INTERVENTIONS:  - Conduct assessment to determine patient/family and health care team treatment goals, and need for post-acute services based on payer coverage, community resources, and patient preferences, and barriers to discharge  - Address psychosocial, clinical, and financial barriers to discharge as identified in assessment in conjunction with the patient/family and health care team  - Arrange appropriate level of post-acute services according to patient's   needs and preference and payer coverage in collaboration with the physician and health care team  - Communicate with and update the patient/family, physician, and health care team regarding progress on the discharge plan  - Arrange appropriate transportation to post-acute venues   Outcome: Progressing      Problem: PSYCHOSIS  Goal: Will report no hallucinations or delusions  Interventions:  - Administer medication as  ordered  - Every waking shifts and PRN assess for the presence of hallucinations and or delusions  - Assist with reality testing to support increasing orientation  - Assess if patient's hallucinations or delusions are encouraging self-harm or harm to others and intervene as appropriate   Outcome: Progressing      Problem: Nutrition/Hydration-ADULT  Goal: Nutrient/Hydration intake appropriate for improving, restoring or maintaining nutritional needs  Monitor and assess patient's nutrition/hydration status for malnutrition (ex- brittle hair, bruises, dry skin, pale skin and conjunctiva, muscle wasting, smooth red tongue, and disorientation)  Collaborate with interdisciplinary team and initiate plan and interventions as ordered  Monitor patient's weight and dietary intake as ordered or per policy  Utilize nutrition screening tool and intervene per policy   Determine patient's food preferences and provide high-protein, high-caloric foods as appropriate       INTERVENTIONS:  - Monitor oral intake, urinary output, labs, and treatment plans  - Assess nutrition and hydration status and recommend course of action  - Evaluate amount of meals eaten  - Assist patient with eating if necessary   - Allow adequate time for meals  - Recommend/ encourage appropriate diets, oral nutritional supplements, and vitamin/mineral supplements  - Order, calculate, and assess calorie counts as needed  - Recommend, monitor, and adjust tube feedings and TPN/PPN based on assessed needs  - Assess need for intravenous fluids  - Provide specific nutrition/hydration education as appropriate  - Include patient/family/caregiver in decisions related to nutrition   Outcome: Progressing

## 2018-07-07 NOTE — PROGRESS NOTES
During evening medication administration  Area of erythema visible on nose and bilateral check bones  Administered 50mg PO benadryl PRN  Will evaluate effectiveness of medication and pass along to next shift  Safety precautions maintained  Will continue to monitor and assess

## 2018-07-08 PROCEDURE — 99231 SBSQ HOSP IP/OBS SF/LOW 25: CPT | Performed by: NURSE PRACTITIONER

## 2018-07-08 RX ORDER — DIPHENHYDRAMINE HCL 25 MG
25 TABLET ORAL 2 TIMES DAILY
Status: DISCONTINUED | OUTPATIENT
Start: 2018-07-08 | End: 2018-08-08 | Stop reason: HOSPADM

## 2018-07-08 RX ADMIN — LEVOTHYROXINE SODIUM 50 MCG: 25 TABLET ORAL at 06:30

## 2018-07-08 RX ADMIN — BENZTROPINE MESYLATE 1 MG: 1 TABLET ORAL at 08:34

## 2018-07-08 RX ADMIN — DIPHENHYDRAMINE HYDROCHLORIDE 25 MG: 25 TABLET ORAL at 17:44

## 2018-07-08 RX ADMIN — BENZTROPINE MESYLATE 1 MG: 1 TABLET ORAL at 20:54

## 2018-07-08 RX ADMIN — HALOPERIDOL 5 MG: 5 TABLET ORAL at 12:29

## 2018-07-08 RX ADMIN — CHLORPROMAZINE HYDROCHLORIDE 50 MG: 25 TABLET, SUGAR COATED ORAL at 08:34

## 2018-07-08 RX ADMIN — CHLORPROMAZINE HYDROCHLORIDE 25 MG: 25 TABLET, SUGAR COATED ORAL at 20:55

## 2018-07-08 RX ADMIN — HALOPERIDOL 5 MG: 5 TABLET ORAL at 17:44

## 2018-07-08 NOTE — PROGRESS NOTES
Gabino alston withdrawn tonight  He was given the option to refuse HS Haldol 5 mg due to a slight butterfly nasal fold rash  Patient agreed to take medication with an ordered PRN of Cogentin 1 mg  Told to come to staff is he has any changes, medically or other symptoms  House lotion applied to area  Maintained on Q 15 minute safety checks  No acting out, suicidal ideations, and/or homicidal behaviors  No changes in medical condition or current complaints noted  Remains a fall risk  Fluids maintained at bedside to promote hydration

## 2018-07-08 NOTE — PLAN OF CARE
Problem: Alteration in Thoughts and Perception  Goal: Treatment Goal: Gain control of psychotic behaviors/thinking, reduce/eliminate presenting symptoms and demonstrate improved reality functioning upon discharge  Outcome: Progressing    Goal: Refrain from acting on delusional thinking/internal stimuli  Interventions:  - Monitor patient closely, per order   - Utilize least restrictive measures   - Set reasonable limits, give positive feedback for acceptable   - Administer medications as ordered and monitor of potential side effects   Outcome: Progressing    Goal: Recognize dysfunctional thoughts, communicate reality-based thoughts at the time of discharge  Interventions:  - Provide medication and psycho-education to assist patient in compliance and developing insight into his/her illness    Outcome: Progressing      Problem: Anxiety  Goal: Anxiety is at manageable level  Interventions:  - Assess and monitor patient's anxiety level  - Monitor for signs and symptoms of anxiety both physical and emotional (heart palpitations, chest pain, shortness of breath, headaches, nausea, feeling jumpy, restlessness, irritable, apprehensive)  - Collaborate with interdisciplinary team and initiate plan and interventions as ordered    - Independence patient to unit/surroundings  - Explain treatment plan  - Encourage participation in care  - Encourage verbalization of concerns/fears  - Identify coping mechanisms  - Assist in developing anxiety-reducing skills  - Administer/offer alternative therapies  - Limit or eliminate stimulants   Outcome: Progressing      Problem: Risk for Violence/Aggression Toward Others  Goal: Treatment Goal: Refrain from acts of violence/aggression during length of stay, and demonstrate improved impulse control at the time of discharge  Outcome: Progressing    Goal: Refrain from destructive acts on the environment or property  Outcome: Progressing      Problem: DISCHARGE PLANNING - CARE MANAGEMENT  Goal: Discharge to post-acute care or home with appropriate resources  INTERVENTIONS:  - Conduct assessment to determine patient/family and health care team treatment goals, and need for post-acute services based on payer coverage, community resources, and patient preferences, and barriers to discharge  - Address psychosocial, clinical, and financial barriers to discharge as identified in assessment in conjunction with the patient/family and health care team  - Arrange appropriate level of post-acute services according to patient's   needs and preference and payer coverage in collaboration with the physician and health care team  - Communicate with and update the patient/family, physician, and health care team regarding progress on the discharge plan  - Arrange appropriate transportation to post-acute venues   Outcome: Progressing      Problem: Ineffective Coping  Goal: Participates in unit activities  Interventions:  - Provide therapeutic environment   - Provide required programming   - Redirect inappropriate behaviors     Outcome: Progressing      Problem: PSYCHOSIS  Goal: Will report no hallucinations or delusions  Interventions:  - Administer medication as  ordered  - Every waking shifts and PRN assess for the presence of hallucinations and or delusions  - Assist with reality testing to support increasing orientation  - Assess if patient's hallucinations or delusions are encouraging self-harm or harm to others and intervene as appropriate   Outcome: Progressing      Problem: Nutrition/Hydration-ADULT  Goal: Nutrient/Hydration intake appropriate for improving, restoring or maintaining nutritional needs  Monitor and assess patient's nutrition/hydration status for malnutrition (ex- brittle hair, bruises, dry skin, pale skin and conjunctiva, muscle wasting, smooth red tongue, and disorientation)  Collaborate with interdisciplinary team and initiate plan and interventions as ordered    Monitor patient's weight and dietary intake as ordered or per policy  Utilize nutrition screening tool and intervene per policy  Determine patient's food preferences and provide high-protein, high-caloric foods as appropriate       INTERVENTIONS:  - Monitor oral intake, urinary output, labs, and treatment plans  - Assess nutrition and hydration status and recommend course of action  - Evaluate amount of meals eaten  - Assist patient with eating if necessary   - Allow adequate time for meals  - Recommend/ encourage appropriate diets, oral nutritional supplements, and vitamin/mineral supplements  - Order, calculate, and assess calorie counts as needed  - Recommend, monitor, and adjust tube feedings and TPN/PPN based on assessed needs  - Assess need for intravenous fluids  - Provide specific nutrition/hydration education as appropriate  - Include patient/family/caregiver in decisions related to nutrition   Outcome: Progressing

## 2018-07-08 NOTE — PROGRESS NOTES
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)    Unspecified mood (affective) disorder (HCC)    Amphetamine abuse   This is a 15 min psychiatric progress note on [facility]  10 min were spent in coordination and care of treatment of this patient  Coordination care consisted meeting with the nursing staff discussed patient's progress, documentation medication orders behaviors over last 24 hr   Per the nursing staff on the patient's eating 100% of his meals he does sleep fairly well through the night  In examination today, the patient has a mild on rash in the folds of his nose  This was evident in the past when he was taking Haldol  Haldol was a medication that worked the best relieve his psychosis and multiple trials of other medications just were not helpful  So going to increase the Haldol slowly but I am also going to add some Benadryl in the hopes that this reduces are eliminate some mild rash  We will monitor quite closely  He has failed on most other antipsychotics  We have tried everything from the atypical group to most meds in typical group of antipsychotics  We will monitor the use of Haldol closely  Behavior over the last 24 hours:  Less withdrawn  Sleep: normal  Appetite: normal  Medication side effects: Yes , mild rash in nasal folds  ROS: rash in nasal folds    Mental Status Evaluation:  Appearance:  casually dressed   Behavior:  guarded   Speech:  soft   Mood:  anxious   Affect:  constricted   Thought Process:  blocked   Thought Content:  none   Perceptual Disturbances:  Auditory hallucinations without commands   Risk Potential: none   Sensorium:  person, place, time/date and situation   Cognition:  impaired due to illness   Consciousness:  alert and awake    Attention: attention span appeared shorter than expected for age   Insight:  limited   Judgment: limited   Gait/Station: normal gait/station   Motor Activity: no abnormal movements     Progress Toward Goals: Minimal improvement  Less withdrawn    Recommended Treatment: Continue with group therapy, milieu therapy and occupational therapy  Risks, benefits and possible side effects of Medications:   Risks, benefits, and possible side effects of medications explained to patient and patient verbalizes understanding  Medications: continue current psychiatric medications  Labs: Reviewed    Counseling / Coordination of Care  Total floor / unit time spent today 15 minutes  Greater than 50% of total time was spent with the patient and / or family counseling and / or coordination of care   A description of the counseling / coordination of care: 15

## 2018-07-09 PROCEDURE — 99232 SBSQ HOSP IP/OBS MODERATE 35: CPT | Performed by: NURSE PRACTITIONER

## 2018-07-09 RX ORDER — PERPHENAZINE 4 MG/1
4 TABLET, FILM COATED ORAL 2 TIMES DAILY
Status: DISCONTINUED | OUTPATIENT
Start: 2018-07-10 | End: 2018-07-11

## 2018-07-09 RX ADMIN — DIPHENHYDRAMINE HYDROCHLORIDE 25 MG: 25 TABLET ORAL at 08:45

## 2018-07-09 RX ADMIN — BENZTROPINE MESYLATE 1 MG: 1 TABLET ORAL at 20:48

## 2018-07-09 RX ADMIN — DIPHENHYDRAMINE HYDROCHLORIDE 25 MG: 25 TABLET ORAL at 17:34

## 2018-07-09 RX ADMIN — BENZTROPINE MESYLATE 1 MG: 1 TABLET ORAL at 08:45

## 2018-07-09 RX ADMIN — CHLORPROMAZINE HYDROCHLORIDE 50 MG: 25 TABLET, SUGAR COATED ORAL at 08:45

## 2018-07-09 RX ADMIN — LEVOTHYROXINE SODIUM 50 MCG: 25 TABLET ORAL at 05:50

## 2018-07-09 NOTE — PROGRESS NOTES
Hilda Castellanos in community but withdraws to room after short periods  Continues to have facial rash, no itching per patient  Maintained on Q 15 minute safety checks  No acting out, suicidal ideations, and/or homicidal behaviors  No changes in medical condition or current complaints noted  Remains a fall risk  Fluids maintained at bedside to promote hydration

## 2018-07-09 NOTE — PROGRESS NOTES
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)    Unspecified mood (affective) disorder (HCC)    Amphetamine abuse   This is a 25 minutes psychiatric progress note on patient  15 minutes were spent in coordination and care of treatment of the patient  Course care consisted me with the all ties is displaying treatment team discussed patient's progress, documentation medication, orders and behaviors over last 24 hours  Per the nursing staff on the patient is eating 100% of his meals supple through the night  On examination today, patient is requesting not to take the Haldol because it is creating a rash in his nasal folds  He is taking the Benadryl to try to reduce the complains of itching  That has been successful  He still is complaining of psychosis  There are still auditory hallucinations of his brothers talking to him at times be rating him  There are not of a command nature  We discussed treatment options and we never tried perphenazine so we will start the dose at 4 mg twice a day and monitor  He is in agreement  Of note, there is no aggression, agitation or desire to leave the unit  Discharge plan dispositions ongoing  Behavior over the last 24 hours:  unchanged  Sleep: normal  Appetite: normal  Medication side effects: No  ROS: no complaints    Mental Status Evaluation:  Appearance:  casually dressed   Behavior:  evasive   Speech:  soft   Mood:  anxious and euthymic   Affect:  blunted   Thought Process:  blocked   Thought Content:  obsessions   Perceptual Disturbances:  Auditory hallucinations without commands   Risk Potential: none   Sensorium:  person, place, time/date and situation   Cognition:  grossly intact   Consciousness: alert and awake    Attention: attention span appeared shorter than expected for age   Insight:  poor   Judgment: poor   Gait/Station: normal gait/station   Motor Activity: no abnormal movements     Progress Toward Goals: Unchanged    Recommended Treatment: Continue with group therapy, milieu therapy and occupational therapy  Risks, benefits and possible side effects of Medications:   Risks, benefits, and possible side effects of medications explained to patient and patient verbalizes understanding  Medications: planned medication changes: Trial Perphenazine and stop Haldol  Labs: Reviewed    Counseling / Coordination of Care  Total floor / unit time spent today 25 minutes  Greater than 50% of total time was spent with the patient and / or family counseling and / or coordination of care   A description of the counseling / coordination of care: 25

## 2018-07-09 NOTE — PROGRESS NOTES
Pt has been visible on the unit during the day during groups and during social times on the unit  Pt has been letting his needs be known  Pt has been medication and meal compliant, pt is currently in the day room in a group

## 2018-07-09 NOTE — PLAN OF CARE
Problem: Ineffective Coping  Goal: Participates in unit activities  Interventions:  - Provide therapeutic environment   - Provide required programming   - Redirect inappropriate behaviors     Outcome: Progressing  Pt has been attending 50% of art therapy groups  Pt has noted that his medications are making him tired  Pt was appropriate to group, and participated while he attended group  Pt is blocked and blunted  Pt will continue to attend unit programing

## 2018-07-10 PROCEDURE — 99232 SBSQ HOSP IP/OBS MODERATE 35: CPT | Performed by: NURSE PRACTITIONER

## 2018-07-10 RX ADMIN — CHLORPROMAZINE HYDROCHLORIDE 50 MG: 25 TABLET, SUGAR COATED ORAL at 08:33

## 2018-07-10 RX ADMIN — DIPHENHYDRAMINE HYDROCHLORIDE 25 MG: 25 TABLET ORAL at 08:32

## 2018-07-10 RX ADMIN — BENZTROPINE MESYLATE 1 MG: 1 TABLET ORAL at 21:08

## 2018-07-10 RX ADMIN — PERPHENAZINE 4 MG: 4 TABLET, FILM COATED ORAL at 08:32

## 2018-07-10 RX ADMIN — PERPHENAZINE 4 MG: 4 TABLET, FILM COATED ORAL at 17:04

## 2018-07-10 RX ADMIN — BENZTROPINE MESYLATE 1 MG: 1 TABLET ORAL at 08:32

## 2018-07-10 RX ADMIN — DIPHENHYDRAMINE HYDROCHLORIDE 25 MG: 25 TABLET ORAL at 17:04

## 2018-07-10 RX ADMIN — LEVOTHYROXINE SODIUM 50 MCG: 25 TABLET ORAL at 06:03

## 2018-07-10 NOTE — NURSING NOTE
Patient's , Mima Garibay, called for documentation of his stay  Patient has a court date tomorrow which will be missed and need to be rescheduled  Explained to [employer] that social work is gone for the day, but I will pass on the information to get out documentation first thing tomorrow  I will leave a message for Holly James to fax verification to [phone]

## 2018-07-10 NOTE — NURSING NOTE
Patient continues to have auditory hallucinations  He states the voices are saying mean things to him and told him "he's a piece of shit and should kill himself"  Patient stated that even though voices told him to kill himself he has no intentions to do so

## 2018-07-10 NOTE — PROGRESS NOTES
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)    Unspecified mood (affective) disorder (HCC)    Amphetamine abuse    Some 25 min psychiatric progress note rajendra Chauhan  15 min were spent in coordination and care of treatment of this patient for cardiac catheterization consistently with the multi-disciplinary treatment team discussed patient's progress, documentation medication orders behaviors over the last 24 hr   Per the nursing staff on the patient's eating 100% of his meals supple through the night  On examination today, the patient the patient is reporting auditory hallucinations  There has been no change in the amount of auditory hallucinations or  the content of the auditory hallucinations he has been experiencing yet with the change over to perphenazine  The 8 mg perphenazine full dose will be given today  We will evaluate done any improvement tomorrow  He continues to require inpatient care and treatment secondary to continued persistent psychosis  Behavior over the last 24 hours:  unchanged  Sleep: normal  Appetite: normal  Medication side effects: No  ROS: no complaints    Mental Status Evaluation:  Appearance:  casually dressed   Behavior:  guarded   Speech:  soft   Mood:  constricted   Affect:  constricted   Thought Process:  blocked   Thought Content:  obsessions   Perceptual Disturbances:  Auditory hallucinations without commands   Risk Potential: none   Sensorium:  person, place, time/date and situation   Cognition:  grossly intact   Consciousness:  alert and awake    Attention: attention span appeared shorter than expected for age   Insight:  impaired due to illness   Judgment: impaired due to illness   Gait/Station: normal gait/station   Motor Activity: no abnormal movements     Progress Toward Goals: Unchanged    Recommended Treatment: Continue with group therapy, milieu therapy and occupational therapy  Risks, benefits and possible side effects of Medications:   Risks, benefits, and possible side effects of medications explained to patient and patient verbalizes understanding  Medications: continue current psychiatric medications  Labs: Reviewed    Counseling / Coordination of Care  Total floor / unit time spent today 25 minutes  Greater than 50% of total time was spent with the patient and / or family counseling and / or coordination of care   A description of the counseling / coordination of care: 25

## 2018-07-10 NOTE — PROGRESS NOTES
Patient withdrawn to his room most of the evening, only came out for snack  Behavior calm and controlled  Med compliant  Still reports AH  No bizarre behaviors, pleasant and cooperative  Q 15 minute safety checks maintained

## 2018-07-10 NOTE — PLAN OF CARE
Problem: Ineffective Coping  Goal: Participates in unit activities  Interventions:  - Provide therapeutic environment   - Provide required programming   - Redirect inappropriate behaviors     Outcome: Not Progressing  Pt has been attending approximately 30% of art therapy groups  Pt recently stated he isn't feeling well and had taken benedryl, which makes him groggy  Pt has begun to isolate in his room, but continues to engage in conversation with staff with prompting  Staff will continue to encourage pt to attend art therapy groups

## 2018-07-11 PROCEDURE — 99232 SBSQ HOSP IP/OBS MODERATE 35: CPT | Performed by: PHYSICIAN ASSISTANT

## 2018-07-11 RX ORDER — PERPHENAZINE 4 MG/1
8 TABLET, FILM COATED ORAL 2 TIMES DAILY
Status: DISCONTINUED | OUTPATIENT
Start: 2018-07-11 | End: 2018-07-13

## 2018-07-11 RX ADMIN — CHLORPROMAZINE HYDROCHLORIDE 25 MG: 25 TABLET, SUGAR COATED ORAL at 12:45

## 2018-07-11 RX ADMIN — LEVOTHYROXINE SODIUM 50 MCG: 25 TABLET ORAL at 05:35

## 2018-07-11 RX ADMIN — CHLORPROMAZINE HYDROCHLORIDE 50 MG: 25 TABLET, SUGAR COATED ORAL at 08:24

## 2018-07-11 RX ADMIN — BENZTROPINE MESYLATE 1 MG: 1 TABLET ORAL at 08:23

## 2018-07-11 RX ADMIN — BENZTROPINE MESYLATE 1 MG: 1 TABLET ORAL at 20:54

## 2018-07-11 RX ADMIN — PERPHENAZINE 8 MG: 4 TABLET, FILM COATED ORAL at 17:22

## 2018-07-11 RX ADMIN — DIPHENHYDRAMINE HYDROCHLORIDE 25 MG: 25 TABLET ORAL at 17:22

## 2018-07-11 RX ADMIN — PERPHENAZINE 4 MG: 4 TABLET, FILM COATED ORAL at 08:24

## 2018-07-11 RX ADMIN — DIPHENHYDRAMINE HYDROCHLORIDE 25 MG: 25 TABLET ORAL at 08:24

## 2018-07-11 RX ADMIN — CHLORPROMAZINE HYDROCHLORIDE 25 MG: 25 TABLET, SUGAR COATED ORAL at 20:54

## 2018-07-11 NOTE — PROGRESS NOTES
Pt isolated to room besides meals and meds  Reports that voices today are very "agitating " Pt advised to seek staff for PRNs or to speak to process feelings  Pt agreed  Pt presents with better eye contact than previous days

## 2018-07-11 NOTE — SOCIAL WORK
sw spoke with treating psych and unit director;   Both in agreement that competency eval inappropriate on unit due to pt psychosis and HIPPA (of other patients);  sw placed phone call to Rolf Hay to advise - Rolf Hay expressed understanding

## 2018-07-11 NOTE — PLAN OF CARE
Alteration in Thoughts and Perception     Treatment Goal: Gain control of psychotic behaviors/thinking, reduce/eliminate presenting symptoms and demonstrate improved reality functioning upon discharge Progressing     Refrain from acting on delusional thinking/internal stimuli Progressing     Recognize dysfunctional thoughts, communicate reality-based thoughts at the time of discharge Progressing        Anxiety     [facility] to [facility] care or home with appropriate resources Progressing        Ineffective Coping     Participates in unit activities Progressing        Nutrition/Hydration-ADULT     Nutrient/Hydration intake appropriate for improving, restoring or maintaining nutritional needs Progressing        PSYCHOSIS     Will report no hallucinations or delusions Progressing        Risk for Violence/Aggression Toward Others     Treatment Goal: Refrain from acts of violence/aggression during length of stay, and demonstrate improved impulse control at the time of discharge Progressing     Refrain from destructive acts on the environment or property Progressing

## 2018-07-11 NOTE — PLAN OF CARE
Problem: Ineffective Coping  Goal: Participates in unit activities  Interventions:  - Provide therapeutic environment   - Provide required programming   - Redirect inappropriate behaviors     Outcome: Not Progressing  AT  encouraged PT to get out of bed and attend the morning art therapy group this morning, where he was compliant at start and elected to sit with a small group of peers  PT displayed a depressed mood, flat affect, with minimal eye contact until prompted  PT brought a project in process that he was working on to group, however chose not to work on it then during group time  PT displayed poor concentration and minimal motivation this morning  PT listened to the group instruction, then chose to leave the group and return to his room, without any return to group  AT will continue to encourage group participation to increase positive social interactions

## 2018-07-11 NOTE — SOCIAL WORK
lizzeth received phone call from Stacie Ribera at [address] [place] office [phone] requesting competency examination;  lizzeth advised that the facility will not provide competency eval while patient is on unit;  Stacie Ribera asked if PD office can send their own provider to complete eval; lizzeth will speak with treating psych and unit director for determination and advise

## 2018-07-11 NOTE — PROGRESS NOTES
Patient withdrawn to room  Continues to have auditory hallucinations  No aggression noted  Hygiene remains fair  Superficial during assessment  Maintained on Q 15 minute safety checks  No acting out, suicidal ideations, and/or homicidal behaviors  No changes in medical condition or current complaints noted  Remains a fall risk  Fluids maintained at bedside to promote hydration

## 2018-07-11 NOTE — SOCIAL WORK
LIZZETH received handwritten note from unidentified staff member stating that patient , Maddi Sykes, is requesting proof of hospitalization for court hearing in am on pt behalf;  lizzeth met with patient in pt room - pt agreeable to letter of hospitalization for ;   Pt signed IGOR for information released;  lizzeth faxed --letter of hospitalization to Atty Closs per request at [phone]

## 2018-07-11 NOTE — PROGRESS NOTES
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)    Unspecified mood (affective) disorder (HCC)    Amphetamine abuse      Behavior over the last 24 hours:  Unchanged  Staff reports that the patient continues to experience auditory hallucinations with no consistent improvement  He was started on perphenazine yesterday, and thus far he is not experiencing any side effects  He was cooperative during the interview this morning  He states he slept poorly last night due to auditory hallucinations  He states that the voices were funny last night  This morning, he reports that the voices are evil    Sleep: poor due to AH  Appetite: normal  Medication side effects: No  ROS: no complaints    Mental Status Evaluation:  Appearance:  age appropriate and disheveled   Behavior:  normal   Speech:  normal pitch and normal volume   Mood:  anxious and constricted   Affect:  constricted   Thought Process:  concrete   Thought Content:  delusions  persecutory   Perceptual Disturbances: Auditory hallucinations without commands   Risk Potential: denies SI/HI   Sensorium:  person, place, time/date and situation   Cognition:  grossly intact   Consciousness:  alert and awake    Attention: attention span appeared shorter than expected for age   Insight:  limited   Judgment: limited   Gait/Station: normal gait/station and ataxic   Motor Activity: no abnormal movements     Progress Toward Goals: Ongoing    Recommended Treatment: Continue with group therapy, milieu therapy and occupational therapy  Risks, benefits and possible side effects of Medications:   Risks, benefits, and possible side effects of medications explained to patient and patient verbalizes understanding  Medications:   all current active meds have been reviewed, current meds:   Current Facility-Administered Medications   Medication Dose Route Frequency    acetaminophen (TYLENOL) tablet 650 mg  650 mg Oral Q4H PRN    aluminum-magnesium hydroxide-simethicone (MYLANTA) 200-200-20 mg/5 mL oral suspension 30 mL  30 mL Oral Q4H PRN    benztropine (COGENTIN) tablet 1 mg  1 mg Oral BID    chlorproMAZINE (THORAZINE) injection SOLN 25 mg  25 mg Intramuscular Q6H PRN    chlorproMAZINE (THORAZINE) tablet 25 mg  25 mg Oral Q6H PRN    chlorproMAZINE (THORAZINE) tablet 50 mg  50 mg Oral Daily    diphenhydrAMINE (BENADRYL) injection 50 mg  50 mg Intramuscular Q4H PRN    diphenhydrAMINE (BENADRYL) tablet 25 mg  25 mg Oral BID    diphenhydrAMINE (BENADRYL) tablet 50 mg  50 mg Oral Q4H PRN    levothyroxine tablet 50 mcg  50 mcg Oral Early Morning    magnesium hydroxide (MILK OF MAGNESIA) 400 mg/5 mL oral suspension 30 mL  30 mL Oral Daily PRN    nicotine (NICODERM CQ) 21 mg/24 hr TD 24 hr patch 21 mg  21 mg Transdermal Daily    perphenazine tablet 4 mg  4 mg Oral BID    traZODone (DESYREL) tablet 50 mg  50 mg Oral HS PRN    and planned medication changes: Will continue to titrate up the dose of perphenazine and monitor for response and tolerability  Labs: Reviewed    Counseling / Coordination of Care  Total floor / unit time spent today 15 minutes  Greater than 50% of total time was spent with the patient and / or family counseling and / or coordination of care   A description of the counseling / coordination of care: 15

## 2018-07-11 NOTE — PROGRESS NOTES
SW met with patient  Pt denies SI/HI; c/o voices, pt reports,"they're getting on my nerves"  Encouraged pt to request prn medications for hallucinations  Pt agreed  Pt nurse notified of pt c/o hallucinations  No other questions or concerns

## 2018-07-12 PROCEDURE — 99232 SBSQ HOSP IP/OBS MODERATE 35: CPT | Performed by: NURSE PRACTITIONER

## 2018-07-12 RX ADMIN — LEVOTHYROXINE SODIUM 50 MCG: 25 TABLET ORAL at 06:29

## 2018-07-12 RX ADMIN — PERPHENAZINE 8 MG: 4 TABLET, FILM COATED ORAL at 17:19

## 2018-07-12 RX ADMIN — BENZTROPINE MESYLATE 1 MG: 1 TABLET ORAL at 08:52

## 2018-07-12 RX ADMIN — BENZTROPINE MESYLATE 1 MG: 1 TABLET ORAL at 21:24

## 2018-07-12 RX ADMIN — DIPHENHYDRAMINE HYDROCHLORIDE 25 MG: 25 TABLET ORAL at 08:52

## 2018-07-12 RX ADMIN — DIPHENHYDRAMINE HYDROCHLORIDE 25 MG: 25 TABLET ORAL at 17:19

## 2018-07-12 RX ADMIN — PERPHENAZINE 8 MG: 4 TABLET, FILM COATED ORAL at 08:52

## 2018-07-12 NOTE — PLAN OF CARE
PSYCHOSIS     Will report no hallucinations or delusions Not Progressing          Alteration in Thoughts and Perception     Treatment Goal: Gain control of psychotic behaviors/thinking, reduce/eliminate presenting symptoms and demonstrate improved reality functioning upon discharge Progressing     Refrain from acting on delusional thinking/internal stimuli Progressing     Recognize dysfunctional thoughts, communicate reality-based thoughts at the time of discharge Progressing        Anxiety     Anxiety is at manageable level [facility] Discharge to post-acute care or home with appropriate resources Progressing        Ineffective Coping     Participates in unit activities Progressing        Nutrition/Hydration-ADULT     Nutrient/Hydration intake appropriate for improving, restoring or maintaining nutritional needs Progressing        Risk for Violence/Aggression Toward Others     Treatment Goal: Refrain from acts of violence/aggression during length of stay, and demonstrate improved impulse control at the time of discharge Progressing     Refrain from destructive acts on the environment or property Progressing

## 2018-07-12 NOTE — ESCALATED TEAM TX
Weekly treatment team meeting held    Patient continues to not be stable for discharge; medications are being adjusted

## 2018-07-12 NOTE — PROGRESS NOTES
Pt is flat, but remains calm and controlled  No c/o  No S/S of distress at this time  Pt still remains internally preoccupied  Pt still reports AH of his brothers  Pt doesn't elaborate much on what the voices are saying, but does state that they are more controlled  Pt denies any SI/HI  Pt usually keeps to self, but is visible in the community at times  Pt tends to his hygiene and ADL's this shift  Pt cooperative with staff and is compliant with meds and meals  Will continue Q 15 min checks to maintain pt safety

## 2018-07-12 NOTE — PROGRESS NOTES
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)    Unspecified mood (affective) disorder (HCC)    Amphetamine abuse    This is a 25 min psychiatric progress note on patient  15 min were spent in coordination and care of treatment of this patient  Coordination of care consisted me with the multi-disciplinary treatment team discussed patient's progress, documentation medication orders and behaviors over last 24 hr   Per the nursing staff on the patient's eating 100% of his meals supple through the night  In examination take on the patient is still experiencing auditory hallucinations  He says he has not received much relief from the current medication regimen  The voices continue to be of a demeaning nature according to the patient but most the time, he is observed laughing and not in any distress with auditory hallucinations he is experiencing  We will continue to work with the perphenazine to see if we can at least decrease the amount of some internal stimuli he is experiencing  I did explain the patient that of the perphenazine is not work for him, then the only medication we have not tried is  Clozaril  This may be an option  I also explained to him that he would need to agree to weekly blood work while he is on the Clozaril     For now, discharge planning and disposition are ongoing    Behavior over the last 24 hours:  unchanged  Sleep: normal  Appetite: normal  Medication side effects: No  ROS: no complaints    Mental Status Evaluation:  Appearance:  casually dressed and disheveled   Behavior:  guarded   Speech:  soft   Mood:  constricted   Affect:  constricted   Thought Process:  disorganized   Thought Content:  delusions obsessive/rumination   Perceptual Disturbances: Auditory hallucinations without commands   Risk Potential: none   Sensorium:  person, place and time/date   Cognition:  impaired due to illness   Consciousness:  alert and awake    Attention: attention span appeared shorter than expected for age   Insight:  limited   Judgment: limited   Gait/Station: normal gait/station   Motor Activity: no abnormal movements     Progress Toward Goals: Unchanged    Recommended Treatment: Continue with group therapy, milieu therapy and occupational therapy  Risks, benefits and possible side effects of Medications:   Risks, benefits, and possible side effects of medications explained to patient and patient verbalizes understanding  Medications: planned medication changes: increase Trilafon in AM  Consider Clozaril  Labs: Reviewed    Counseling / Coordination of Care  Total floor / unit time spent today 25 minutes  Greater than 50% of total time was spent with the patient and / or family counseling and / or coordination of care   A description of the counseling / coordination of care: 25

## 2018-07-12 NOTE — PLAN OF CARE
Problem: Ineffective Coping  Goal: Participates in unit activities  Interventions:  - Provide therapeutic environment   - Provide required programming   - Redirect inappropriate behaviors     Outcome: Progressing  PT continues to decline invites to attend most of the art therapy groups offered and has been observed isolating in pt room  PT will engage in conversation when prompted with AT 1:1  PT will continue to be encouraged to attend unit programming

## 2018-07-12 NOTE — PROGRESS NOTES
Patient observed sleeping during most Q15 minute safety checks (second portion of shift)  No suicidal ideations, acting out or homicidal behaviors  No change in medical condition or complaints voiced  Fluids maintained at bedside to promote hydration

## 2018-07-12 NOTE — PLAN OF CARE
Problem: Alteration in Thoughts and Perception  Goal: Refrain from acting on delusional thinking/internal stimuli  Interventions:  - Monitor patient closely, per order   - Utilize least restrictive measures   - Set reasonable limits, give positive feedback for acceptable   - Administer medications as ordered and monitor of potential side effects   Outcome: Progressing      Problem: Anxiety  Goal: Anxiety is at manageable level  Interventions:  - Assess and monitor patient's anxiety level  - Monitor for signs and symptoms of anxiety both physical and emotional (heart palpitations, chest pain, shortness of breath, headaches, nausea, feeling jumpy, restlessness, irritable, apprehensive)  - Collaborate with interdisciplinary team and initiate plan and interventions as ordered    - McDonald patient to unit/surroundings  - Explain treatment plan  - Encourage participation in care  - Encourage verbalization of concerns/fears  - Identify coping mechanisms  - Assist in developing anxiety-reducing skills  - Administer/offer alternative therapies  - Limit or eliminate stimulants   Outcome: Progressing      Problem: Risk for Violence/Aggression Toward Others  Goal: Treatment Goal: Refrain from acts of violence/aggression during length of stay, and demonstrate improved impulse control at the time of discharge  Outcome: Progressing    Goal: Refrain from destructive acts on the environment or property  Outcome: Progressing      Problem: DISCHARGE PLANNING - CARE MANAGEMENT  Goal: Discharge to post-acute care or home with appropriate resources  INTERVENTIONS:  - Conduct assessment to determine patient/family and health care team treatment goals, and need for post-acute services based on payer coverage, community resources, and patient preferences, and barriers to discharge  - Address psychosocial, clinical, and financial barriers to discharge as identified in assessment in conjunction with the patient/family and health care team  - Arrange appropriate level of post-acute services according to patient's   needs and preference and payer coverage in collaboration with the physician and health care team  - Communicate with and update the patient/family, physician, and health care team regarding progress on the discharge plan  - Arrange appropriate transportation to post-acute venues   Outcome: Progressing      Problem: Ineffective Coping  Goal: Participates in unit activities  Interventions:  - Provide therapeutic environment   - Provide required programming   - Redirect inappropriate behaviors     Outcome: Progressing      Problem: PSYCHOSIS  Goal: Will report no hallucinations or delusions  Interventions:  - Administer medication as  ordered  - Every waking shifts and PRN assess for the presence of hallucinations and or delusions  - Assist with reality testing to support increasing orientation  - Assess if patient's hallucinations or delusions are encouraging self-harm or harm to others and intervene as appropriate   Outcome: Progressing      Problem: Nutrition/Hydration-ADULT  Goal: Nutrient/Hydration intake appropriate for improving, restoring or maintaining nutritional needs  Monitor and assess patient's nutrition/hydration status for malnutrition (ex- brittle hair, bruises, dry skin, pale skin and conjunctiva, muscle wasting, smooth red tongue, and disorientation)  Collaborate with interdisciplinary team and initiate plan and interventions as ordered  Monitor patient's weight and dietary intake as ordered or per policy  Utilize nutrition screening tool and intervene per policy  Determine patient's food preferences and provide high-protein, high-caloric foods as appropriate       INTERVENTIONS:  - Monitor oral intake, urinary output, labs, and treatment plans  - Assess nutrition and hydration status and recommend course of action  - Evaluate amount of meals eaten  - Assist patient with eating if necessary   - Allow adequate time for meals  - Recommend/ encourage appropriate diets, oral nutritional supplements, and vitamin/mineral supplements  - Order, calculate, and assess calorie counts as needed  - Recommend, monitor, and adjust tube feedings and TPN/PPN based on assessed needs  - Assess need for intravenous fluids  - Provide specific nutrition/hydration education as appropriate  - Include patient/family/caregiver in decisions related to nutrition   Outcome: Progressing

## 2018-07-12 NOTE — PROGRESS NOTES
Patient admits to ongoing hallucinations  Compliant with medications  Thorazine 25 mg given at HS  Maintained on Q 15 minute safety checks  No acting out, suicidal ideations, and/or homicidal behaviors  No changes in medical condition  Fluids maintained at bedside to promote hydration

## 2018-07-13 PROCEDURE — 99231 SBSQ HOSP IP/OBS SF/LOW 25: CPT | Performed by: PHYSICIAN ASSISTANT

## 2018-07-13 RX ORDER — PERPHENAZINE 4 MG/1
8 TABLET, FILM COATED ORAL 3 TIMES DAILY
Status: DISCONTINUED | OUTPATIENT
Start: 2018-07-13 | End: 2018-07-16

## 2018-07-13 RX ADMIN — PERPHENAZINE 8 MG: 4 TABLET, FILM COATED ORAL at 17:18

## 2018-07-13 RX ADMIN — CHLORPROMAZINE HYDROCHLORIDE 25 MG: 25 TABLET, SUGAR COATED ORAL at 19:48

## 2018-07-13 RX ADMIN — DIPHENHYDRAMINE HYDROCHLORIDE 25 MG: 25 TABLET ORAL at 17:18

## 2018-07-13 RX ADMIN — LEVOTHYROXINE SODIUM 50 MCG: 25 TABLET ORAL at 06:12

## 2018-07-13 RX ADMIN — PERPHENAZINE 8 MG: 4 TABLET, FILM COATED ORAL at 08:24

## 2018-07-13 RX ADMIN — PERPHENAZINE 8 MG: 4 TABLET, FILM COATED ORAL at 19:49

## 2018-07-13 RX ADMIN — BENZTROPINE MESYLATE 1 MG: 1 TABLET ORAL at 19:48

## 2018-07-13 RX ADMIN — DIPHENHYDRAMINE HYDROCHLORIDE 25 MG: 25 TABLET ORAL at 08:24

## 2018-07-13 RX ADMIN — BENZTROPINE MESYLATE 1 MG: 1 TABLET ORAL at 08:24

## 2018-07-13 NOTE — PLAN OF CARE
Problem: Nutrition/Hydration-ADULT  Goal: Nutrient/Hydration intake appropriate for improving, restoring or maintaining nutritional needs  Monitor and assess patient's nutrition/hydration status for malnutrition (ex- brittle hair, bruises, dry skin, pale skin and conjunctiva, muscle wasting, smooth red tongue, and disorientation)  Collaborate with interdisciplinary team and initiate plan and interventions as ordered  Monitor patient's weight and dietary intake as ordered or per policy  Utilize nutrition screening tool and intervene per policy  Determine patient's food preferences and provide high-protein, high-caloric foods as appropriate  INTERVENTIONS:  - Monitor oral intake, urinary output, labs, and treatment plans  - Assess nutrition and hydration status and recommend course of action  - Evaluate amount of meals eaten  - Assist patient with eating if necessary   - Allow adequate time for meals  - Recommend/ encourage appropriate diets, oral nutritional supplements, and vitamin/mineral supplements  - Order, calculate, and assess calorie counts as needed  - Recommend, monitor, and adjust tube feedings and TPN/PPN based on assessed needs  - Assess need for intravenous fluids  - Provide specific nutrition/hydration education as appropriate  - Include patient/family/caregiver in decisions related to nutrition   Outcome: Progressing  He is on a regular meal plan with ensure at meals  His intake ranges from 75 to 100 %  His BG was 90 on 6-29 which was WNL  RDN will evaluate his labs when available  His weight was 190 on 6-30, 195 on 7-5 and 197 on 7-7 with a desirable weight gain of 7 # noted  RDN visited on rounds this am and he stated he is taking the ensure  He is on MOM and nicoderm  His skin is intact   RDN to reassess his nutrition needs at low risk and follow PRN with his team

## 2018-07-13 NOTE — PROGRESS NOTES
Pt has been present in the community all shift  Attending programming and snack  Has been among peers but not observed socializing  Calmly watching TV on downtime  Is controlled  Pt presents for assessment clean but disheveled  Good eye contact  Reports he has had some medication changes recently and the voices are quieter-states, "I think they are running out of insults"  When asked if pt was having hallucinations at this moment pt initially denied such then stated, "Actually I think I just heard my yumiko Arvil Handler say he was going to be picking me up"  Pt did not respond to hallucinations during assessment  Promptly and clearly responded to assessment questions  Does not appear internally preoccupied  Pt denies any recent anger/agitation  Denies any SI/HI  Denies urges to elope  Pt was compliant with scheduled cogentin  Made no request for PRN medication  When asked, pt shows little insight into d/c or treatment plans  Pt is able to make needs known and agrees to report changes in condition/needs to staff  Nursing will continue to monitor and assess for changes and safety with 15 minute checks

## 2018-07-13 NOTE — PROGRESS NOTES
At the start of the night, pt left group programming yelling in anger at one of the MHTs  On approach, pt used derogatory remarks to refer to the staff member who he claims was, "making nasty comments to me through his head into mine"  Pt informs he attempted to attend group but had to leave out of frustration  Pt allowed this writer to process situation with him  Reality reorientation attempted but with little success  Pt has much difficulty distinguishing reality from delusion  Pt has little insight into condition and is unable to identify positive coping skills  With encouragement from nursing to try exercise in his room, walking in the halls or making a list of plans for d/c pt replied, "no, the only thing I can do it sleep it off"  Pt requested early receipt of HS medication, along with PRN thorazine 25mg for agitation  Pt further informs he had a, "much worse" day than yesterday, because of his brother's bothering him  Pt denies SI/HI  Denies urges to elope  Reluctantly agrees to seek out staff with continued/changes in agitation  Will continue to monitor for safety and effectiveness of PRN thorazine, with 15 minute checks

## 2018-07-13 NOTE — PROGRESS NOTES
Progress Note - [address] 33 y o  male MRN: [id number]  Unit/Bed#: [facility] 220-02 Encounter: [id number]    Assessment/Plan   Principal Problem:    Schizoaffective disorder, bipolar type (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)    Unspecified mood (affective) disorder (HCC)    Amphetamine abuse      Behavior over the last 24 hours:    Staff reports that he is no longer exhibiting any agitation, irritability, or restlessness  At times, staff reports that he appears less internally preoccupied  Yesterday he was observed laughing, stating that the voices were funny  This morning he reports feeling "not too good " He states he got very little sleep due to the voices  This morning he states that "the voices are annoying me " He is tolerating perphenazine with no reported side effects  Sleep: insomnia  Appetite: normal  Medication side effects: No  ROS: no complaints    Mental Status Evaluation:  Appearance:  age appropriate and disheveled   Behavior:  cooperative   Speech:  clear   Mood:  anxious and irritable   Affect:  constricted   Thought Process:  blocked at times   Thought Content:  obsessions   Perceptual Disturbances: Auditory hallucinations without commands   Risk Potential: No SI/HI reported   Sensorium:  person, place, time/date and situation   Cognition:  grossly intact   Consciousness:  alert and awake    Attention: attention span appeared shorter than expected for age   Insight:  limited   Judgment: limited   Gait/Station: normal gait/station   Motor Activity: no abnormal movements     Progress Toward Goals: Ongoing    Recommended Treatment: Continue with group therapy, milieu therapy and occupational therapy  Risks, benefits and possible side effects of Medications:   Risks, benefits, and possible side effects of medications explained to patient and patient verbalizes understanding        Medications:   all current active meds have been reviewed, current meds:   Current Facility-Administered Medications   Medication Dose Route Frequency    acetaminophen (TYLENOL) tablet 650 mg  650 mg Oral Q4H PRN    aluminum-magnesium hydroxide-simethicone (MYLANTA) 200-200-20 mg/5 mL oral suspension 30 mL  30 mL Oral Q4H PRN    benztropine (COGENTIN) tablet 1 mg  1 mg Oral BID    chlorproMAZINE (THORAZINE) injection SOLN 25 mg  25 mg Intramuscular Q6H PRN    chlorproMAZINE (THORAZINE) tablet 25 mg  25 mg Oral Q6H PRN    diphenhydrAMINE (BENADRYL) injection 50 mg  50 mg Intramuscular Q4H PRN    diphenhydrAMINE (BENADRYL) tablet 25 mg  25 mg Oral BID    diphenhydrAMINE (BENADRYL) tablet 50 mg  50 mg Oral Q4H PRN    levothyroxine tablet 50 mcg  50 mcg Oral Early Morning    magnesium hydroxide (MILK OF MAGNESIA) 400 mg/5 mL oral suspension 30 mL  30 mL Oral Daily PRN    nicotine (NICODERM CQ) 21 mg/24 hr TD 24 hr patch 21 mg  21 mg Transdermal Daily    perphenazine tablet 8 mg  8 mg Oral TID    traZODone (DESYREL) tablet 50 mg  50 mg Oral HS PRN    and planned medication changes:   Increase perphenazine to 8mg TID  Labs: reviewed    Counseling / Coordination of Care  Total floor / unit time spent today 25 minutes  Greater than 50% of total time was spent with the patient and / or family counseling and / or coordination of care   A description of the counseling / coordination of care: 25

## 2018-07-14 PROCEDURE — 99231 SBSQ HOSP IP/OBS SF/LOW 25: CPT | Performed by: PHYSICIAN ASSISTANT

## 2018-07-14 RX ADMIN — LEVOTHYROXINE SODIUM 50 MCG: 25 TABLET ORAL at 05:15

## 2018-07-14 RX ADMIN — BENZTROPINE MESYLATE 1 MG: 1 TABLET ORAL at 08:23

## 2018-07-14 RX ADMIN — DIPHENHYDRAMINE HYDROCHLORIDE 25 MG: 25 TABLET ORAL at 17:00

## 2018-07-14 RX ADMIN — PERPHENAZINE 8 MG: 4 TABLET, FILM COATED ORAL at 21:32

## 2018-07-14 RX ADMIN — PERPHENAZINE 8 MG: 4 TABLET, FILM COATED ORAL at 08:22

## 2018-07-14 RX ADMIN — DIPHENHYDRAMINE HYDROCHLORIDE 25 MG: 25 TABLET ORAL at 08:22

## 2018-07-14 RX ADMIN — TRAZODONE HYDROCHLORIDE 50 MG: 50 TABLET ORAL at 22:21

## 2018-07-14 RX ADMIN — PERPHENAZINE 8 MG: 4 TABLET, FILM COATED ORAL at 16:59

## 2018-07-14 RX ADMIN — BENZTROPINE MESYLATE 1 MG: 1 TABLET ORAL at 21:32

## 2018-07-14 NOTE — PLAN OF CARE
Refrain from acting on delusional thinking/internal stimuli Not Progressing       Pt continues to act on internal stimuli and delusional thoughts  Just tonight, pt displayed agitation towards staff related to non-reality based thoughts  Recognize dysfunctional thoughts, communicate reality-based thoughts at the time of discharge Not Progressing       Pt responds poorly to attempted reality re-orientation  Pt has difficulty distinguishing delusion from reality  Will report no hallucinations or delusions Not Progressing       Pt continues to report active auditory hallucinations throughout the day

## 2018-07-14 NOTE — PROGRESS NOTES
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)    Unspecified mood (affective) disorder (HCC)    Amphetamine abuse      Behavior over the last 24 hours:    Staff reports that he became upset during an evening group yesterday  He believed that the [facility] was communicating with him through his head  He is redirectable  No aggressive behavior  He reports this morning that he is tolerating the perphenazine titration with no noted adverse effects  No improvement noted yet in the frequency or intensity of his auditory hallucinations  He slept better last night than he had the night before  No new complaints were reported  Sleep: normal  Appetite: normal  Medication side effects: No  ROS: no complaints    Mental Status Evaluation:  Appearance:  age appropriate, casually dressed and disheveled   Behavior:  guarded   Speech:  soft   Mood:  labile   Affect:  constricted   Thought Process:  blocked   Thought Content:  delusions  persecutory   Perceptual Disturbances: Auditory hallucinations without commands   Risk Potential: No SI/HI reported   Sensorium:  Person, place, time/date, situation   Cognition:  grossly intact   Consciousness:  alert and awake    Attention: attention span appeared shorter than expected for age   Insight:  impaired due to illness   Judgment: impaired due to illness   Gait/Station: normal gait/station   Motor Activity: no abnormal movements     Progress Toward Goals: Ongoing  Minimal improvement noted thus far  Recommended Treatment: Continue with group therapy, milieu therapy and occupational therapy        Risks, benefits and possible side effects of Medications:   Risks, benefits, and possible side effects of medications explained to patient and patient verbalizes understanding  Medications:   all current active meds have been reviewed, continue current psychiatric medications, current meds:   Current Facility-Administered Medications   Medication Dose Route Frequency    acetaminophen (TYLENOL) tablet 650 mg  650 mg Oral Q4H PRN    aluminum-magnesium hydroxide-simethicone (MYLANTA) 200-200-20 mg/5 mL oral suspension 30 mL  30 mL Oral Q4H PRN    benztropine (COGENTIN) tablet 1 mg  1 mg Oral BID    chlorproMAZINE (THORAZINE) injection SOLN 25 mg  25 mg Intramuscular Q6H PRN    chlorproMAZINE (THORAZINE) tablet 25 mg  25 mg Oral Q6H PRN    diphenhydrAMINE (BENADRYL) injection 50 mg  50 mg Intramuscular Q4H PRN    diphenhydrAMINE (BENADRYL) tablet 25 mg  25 mg Oral BID    diphenhydrAMINE (BENADRYL) tablet 50 mg  50 mg Oral Q4H PRN    levothyroxine tablet 50 mcg  50 mcg Oral Early Morning    magnesium hydroxide (MILK OF MAGNESIA) 400 mg/5 mL oral suspension 30 mL  30 mL Oral Daily PRN    nicotine (NICODERM CQ) 21 mg/24 hr TD 24 hr patch 21 mg  21 mg Transdermal Daily    perphenazine tablet 8 mg  8 mg Oral TID    traZODone (DESYREL) tablet 50 mg  50 mg Oral HS PRN      We will continue to titrate the dose of perphenazine and monitor response and tolerability  Labs: reviewed    Counseling / Coordination of Care  Total floor / unit time spent today 15 minutes  Greater than 50% of total time was spent with the patient and / or family counseling and / or coordination of care   A description of the counseling / coordination of care: 15

## 2018-07-14 NOTE — PLAN OF CARE
Alteration in Thoughts and Perception     Treatment Goal: Gain control of psychotic behaviors/thinking, reduce/eliminate presenting symptoms and demonstrate improved reality functioning upon discharge Not Progressing     Refrain from acting on delusional thinking/internal stimuli Not Progressing     Recognize dysfunctional thoughts, communicate reality-based thoughts at the time of discharge Not Progressing        Anxiety     Anxiety is at manageable level Not Progressing        DISCHARGE PLANNING - CARE MANAGEMENT     Discharge to post-acute care or home with appropriate resources Not Progressing        Ineffective Coping     Participates in unit activities Not Progressing        PSYCHOSIS     Will report no hallucinations or delusions Not Progressing          Nutrition/Hydration-ADULT     Nutrient/Hydration intake appropriate for improving, restoring or maintaining nutritional needs Progressing        Risk for Violence/Aggression Toward Others     Treatment Goal: Refrain from acts of violence/aggression during length of stay, and demonstrate improved impulse control at the time of discharge Progressing     Refrain from destructive acts on the environment or property Progressing

## 2018-07-14 NOTE — PROGRESS NOTES
Patient bright alert, ate breakfast with peers, back to bed after eating  Patient flat, sleeping often, only visible on unit for medication administration  , compliant with medications and care  Pt irritated related to still having auditory hallucinations and wants them to go away  Bautista si hi and visual hallucinations  Provider aware hallucinations still present, no new orders, Will maintain on safety precautions and 15 minute checks

## 2018-07-15 PROCEDURE — 99231 SBSQ HOSP IP/OBS SF/LOW 25: CPT | Performed by: PHYSICIAN ASSISTANT

## 2018-07-15 RX ADMIN — PERPHENAZINE 8 MG: 4 TABLET, FILM COATED ORAL at 08:50

## 2018-07-15 RX ADMIN — BENZTROPINE MESYLATE 1 MG: 1 TABLET ORAL at 20:45

## 2018-07-15 RX ADMIN — LEVOTHYROXINE SODIUM 50 MCG: 25 TABLET ORAL at 06:13

## 2018-07-15 RX ADMIN — BENZTROPINE MESYLATE 1 MG: 1 TABLET ORAL at 08:51

## 2018-07-15 RX ADMIN — PERPHENAZINE 8 MG: 4 TABLET, FILM COATED ORAL at 20:45

## 2018-07-15 RX ADMIN — PERPHENAZINE 8 MG: 4 TABLET, FILM COATED ORAL at 16:48

## 2018-07-15 RX ADMIN — DIPHENHYDRAMINE HYDROCHLORIDE 25 MG: 25 TABLET ORAL at 08:51

## 2018-07-15 RX ADMIN — TRAZODONE HYDROCHLORIDE 50 MG: 50 TABLET ORAL at 21:55

## 2018-07-15 RX ADMIN — DIPHENHYDRAMINE HYDROCHLORIDE 25 MG: 25 TABLET ORAL at 17:11

## 2018-07-15 NOTE — PROGRESS NOTES
Pt has been present in the community all shift and attending programming/snack  Is among peers but not observed socializing  Presents for assessment calm and appropriate  Offers poor eye contact  States the auditory hallucinations are still present but, "less frequent"  Denies SI/HI  Insight still poor  Is compliant with medication  Pt requested/received PRN trazodone for sleep, will monitor for effectiveness  Pt is able to make needs known and agrees to report changes in condition/needs to staff  Nursing will continue to monitor and assess for changes and safety with 15 minute checks

## 2018-07-15 NOTE — PROGRESS NOTES
With the use of PRN trazodone, as requested by pt for sleep, Pt has slept soundly through the night  No periods of waking have been observed  No acute concerns have been noted  15 minute checks have been maintained incident free   Will continue to monitor

## 2018-07-15 NOTE — PLAN OF CARE
Alteration in Thoughts and Perception     Treatment Goal: Gain control of psychotic behaviors/thinking, reduce/eliminate presenting symptoms and demonstrate improved reality functioning upon discharge Not Progressing        DISCHARGE PLANNING - CARE MANAGEMENT     Discharge to post-acute care or home with appropriate resources Not Progressing        Nutrition/Hydration-ADULT     Nutrient/Hydration intake appropriate for improving, restoring or maintaining nutritional needs Not Progressing        PSYCHOSIS     Will report no hallucinations or delusions Not Progressing          Alteration in Thoughts and Perception     Refrain from acting on delusional thinking/internal stimuli Progressing     Recognize dysfunctional thoughts, communicate reality-based thoughts at the time of discharge Progressing        Anxiety     Anxiety is at manageable level Progressing        Ineffective Coping     Participates in unit activities Progressing        Risk for Violence/Aggression Toward Others     Treatment Goal: Refrain from acts of violence/aggression during length of stay, and demonstrate improved impulse control at the time of discharge Progressing     Refrain from destructive acts on the environment or property Progressing

## 2018-07-15 NOTE — PROGRESS NOTES
Patient alert, awake ate breakfast with peers, back to bed after eating  Pt remains mostly withdrawn to self but was observed watching  tv with peers, patient continues to c/o auditory hallucinations that are non command in nature and "just annoying" to patient  Will maintain on  safety precautions and 15 minute checks, no needs identified  Pt remains calm , controlled and compliant

## 2018-07-15 NOTE — PROGRESS NOTES
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)    Unspecified mood (affective) disorder (HCC)    Amphetamine abuse      Behavior over the last 24 hours:    Staff reports that he has been reporting less auditory hallucinations  He remains cooperative and medication compliant  No episodes of agitation since last seen  He states he is feeling a little better today, noting "less voices at a time " He continues to tolerate the titration of perphenazine  He offered no new complaints  Sleep: normal  Appetite: normal  Medication side effects: No  ROS: no complaints    Mental Status Evaluation:  Appearance:  age appropriate and casually dressed   Behavior:  Cooperative   Speech:  soft   Mood:  constricted   Affect:  constricted   Thought Process:  blocked   Thought Content:  delusions  persecutory   Perceptual Disturbances: Auditory hallucinations without commands   Risk Potential: No current SI/HI   Sensorium:  person, place, time/date and situation   Cognition:  grossly intact   Consciousness:  alert and awake    Attention: attention span appeared shorter than expected for age   Insight:  impaired due to illness   Judgment: impaired due to illness   Gait/Station: normal gait/station   Motor Activity: no abnormal movements     Progress Toward Goals: Ongoing    Recommended Treatment: Continue with group therapy, milieu therapy and occupational therapy  Risks, benefits and possible side effects of Medications:   Risks, benefits, and possible side effects of medications explained to patient and patient verbalizes understanding        Medications:   all current active meds have been reviewed, continue current psychiatric medications and current meds:   Current Facility-Administered Medications   Medication Dose Route Frequency    acetaminophen (TYLENOL) tablet 650 mg  650 mg Oral Q4H PRN    aluminum-magnesium hydroxide-simethicone (MYLANTA) 200-200-20 mg/5 mL oral suspension 30 mL  30 mL Oral Q4H PRN    benztropine (COGENTIN) tablet 1 mg  1 mg Oral BID    chlorproMAZINE (THORAZINE) injection SOLN 25 mg  25 mg Intramuscular Q6H PRN    chlorproMAZINE (THORAZINE) tablet 25 mg  25 mg Oral Q6H PRN    diphenhydrAMINE (BENADRYL) injection 50 mg  50 mg Intramuscular Q4H PRN    diphenhydrAMINE (BENADRYL) tablet 25 mg  25 mg Oral BID    diphenhydrAMINE (BENADRYL) tablet 50 mg  50 mg Oral Q4H PRN    levothyroxine tablet 50 mcg  50 mcg Oral Early Morning    magnesium hydroxide (MILK OF MAGNESIA) 400 mg/5 mL oral suspension 30 mL  30 mL Oral Daily PRN    nicotine (NICODERM CQ) 21 mg/24 hr TD 24 hr patch 21 mg  21 mg Transdermal Daily    perphenazine tablet 8 mg  8 mg Oral TID    traZODone (DESYREL) tablet 50 mg  50 mg Oral HS PRN     Labs: Reviewed    Counseling / Coordination of Care  Total floor / unit time spent today 15 minutes  Greater than 50% of total time was spent with the patient and / or family counseling and / or coordination of care   A description of the counseling / coordination of care: 15

## 2018-07-16 PROCEDURE — 99232 SBSQ HOSP IP/OBS MODERATE 35: CPT | Performed by: NURSE PRACTITIONER

## 2018-07-16 RX ORDER — LITHIUM CARBONATE 300 MG/1
300 CAPSULE ORAL 2 TIMES DAILY WITH MEALS
Status: DISCONTINUED | OUTPATIENT
Start: 2018-07-16 | End: 2018-07-21

## 2018-07-16 RX ORDER — PERPHENAZINE 4 MG/1
16 TABLET, FILM COATED ORAL 2 TIMES DAILY
Status: DISCONTINUED | OUTPATIENT
Start: 2018-07-16 | End: 2018-07-19

## 2018-07-16 RX ADMIN — BENZTROPINE MESYLATE 1 MG: 1 TABLET ORAL at 09:15

## 2018-07-16 RX ADMIN — PERPHENAZINE 8 MG: 4 TABLET, FILM COATED ORAL at 09:15

## 2018-07-16 RX ADMIN — LITHIUM CARBONATE 300 MG: 300 CAPSULE, GELATIN COATED ORAL at 17:04

## 2018-07-16 RX ADMIN — DIPHENHYDRAMINE HYDROCHLORIDE 25 MG: 25 TABLET ORAL at 09:15

## 2018-07-16 RX ADMIN — LEVOTHYROXINE SODIUM 50 MCG: 25 TABLET ORAL at 06:14

## 2018-07-16 RX ADMIN — TRAZODONE HYDROCHLORIDE 50 MG: 50 TABLET ORAL at 21:59

## 2018-07-16 RX ADMIN — DIPHENHYDRAMINE HYDROCHLORIDE 25 MG: 25 TABLET ORAL at 17:05

## 2018-07-16 RX ADMIN — BENZTROPINE MESYLATE 1 MG: 1 TABLET ORAL at 21:59

## 2018-07-16 RX ADMIN — PERPHENAZINE 16 MG: 4 TABLET, FILM COATED ORAL at 17:05

## 2018-07-16 NOTE — PLAN OF CARE
Problem: Ineffective Coping  Goal: Participates in unit activities  Interventions:  - Provide therapeutic environment   - Provide required programming   - Redirect inappropriate behaviors     Outcome: Not Progressing

## 2018-07-16 NOTE — PROGRESS NOTES
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)    Unspecified mood (affective) disorder (HCC)    Amphetamine abuse   This is a 25 min psychiatric progress note on patient  15 min were spent in coordination and care of treatment of the patient  Coordination care consisted me with the multi-disciplinary treatment team discussed patient's progress, documentation, medication, orders and behaviors over last 24 hr   Per the nursing staff on the patient's eating 100% of his meal supple through the night  On examination today, the patient is getting frustrated with his auditory hallucinations  He said they are still persist   He did say however that, that some he felt better when he was on some lithium  He said the voices were at least more controlled although they were still there  He also felt the perphenazine was working the best out of any of the antipsychotics he has been on this for  So today we will increase the perphenazine to 32 mg total per day and lithium is now at 300 mg twice a day  Will check a lithium level by the end of the week  He continues to require inpatient care treatment secondary to his persistent psychosis  Behavior over the last 24 hours:  unchanged  Sleep: normal  Appetite: normal  Medication side effects: No  ROS: no complaints    Mental Status Evaluation:  Appearance:  casually dressed and disheveled   Behavior:  guarded   Speech:  soft   Mood:  anxious and depressed   Affect:  constricted   Thought Process:  disorganized   Thought Content:  delusions  obsessive/rumination   Perceptual Disturbances:  Auditory hallucinations without commands   Risk Potential: none   Sensorium:  person, place and time/date   Cognition: impaired due to illness   Consciousness:  alert and awake    Attention: attention span appeared shorter than expected for age   Insight:  limited   Judgment: limited   Gait/Station: normal gait/station   Motor Activity: no abnormal movements     Progress Toward Goals: Unchanged    Recommended Treatment: Continue with group therapy, milieu therapy and occupational therapy  Risks, benefits and possible side effects of Medications:   Risks, benefits, and possible side effects of medications explained to patient and patient verbalizes understanding  Medications: planned medication changes: Increase Perphenazine and add [place]  Labs: Reviewed    Counseling / Coordination of Care  Total floor / unit time spent today 25 minutes  Greater than 50% of total time was spent with the patient and / or family counseling and / or coordination of care   A description of the counseling / coordination of care: 25

## 2018-07-16 NOTE — PLAN OF CARE
PSYCHOSIS     Will report no hallucinations or delusions Not Progressing          Alteration in Thoughts and Perception     Treatment Goal: Gain control of psychotic behaviors/thinking, reduce/eliminate presenting symptoms and demonstrate improved reality functioning upon discharge Progressing     Refrain from acting on delusional thinking/internal stimuli Progressing     Recognize dysfunctional thoughts, communicate reality-based thoughts at the time of discharge Progressing        Anxiety     Anxiety is at manageable level [address] [facility] Discharge to post-acute care or home with appropriate resources Progressing        Ineffective Coping     Participates in unit activities Progressing        Nutrition/Hydration-ADULT     Nutrient/Hydration intake appropriate for improving, restoring or maintaining nutritional needs Progressing        Risk for Violence/Aggression Toward Others     Treatment Goal: Refrain from acts of violence/aggression during length of stay, and demonstrate improved impulse control at the time of discharge Progressing     Refrain from destructive acts on the environment or property Progressing

## 2018-07-16 NOTE — PROGRESS NOTES
After receipt of PRN trazodone as requested by pt for sleep, Pt proceeded to sleep soundly through the night  No periods of waking have been observed  No acute concerns have been noted  15 minute checks have been maintained incident free   Will continue to monitor

## 2018-07-16 NOTE — PROGRESS NOTES
Pt is flat and remains WD to room for most of the AM   Pt remains compliant with meds and meals  No c/o pain  No S/S of distress at this time  Pt still reports AH of his brothers  Pt stated the voices are more quiet and controlled  Pt still appears internally preoccupied  Pt has remained controlled and no issues with elopement  Pt is able to make needs known  Pt is disheveled, but does tend to his hygiene and ADL's  Denies any SI/HI at this time  Will continue Q 15 min checks to maintain pt safety

## 2018-07-16 NOTE — PROGRESS NOTES
Patient has been visible on the unit  Attended and participated in evening unit activities  Interacting appropriately with peers  No obvious delusional thoughts on assessment  Denies s/i or any thoughts to self harm  Reports auditory hallucinations have decreased and are becoming quieter  Says he hopes the meds are finally working

## 2018-07-17 PROCEDURE — 99231 SBSQ HOSP IP/OBS SF/LOW 25: CPT | Performed by: PHYSICIAN ASSISTANT

## 2018-07-17 RX ADMIN — LEVOTHYROXINE SODIUM 50 MCG: 25 TABLET ORAL at 05:59

## 2018-07-17 RX ADMIN — BENZTROPINE MESYLATE 1 MG: 1 TABLET ORAL at 21:53

## 2018-07-17 RX ADMIN — PERPHENAZINE 16 MG: 4 TABLET, FILM COATED ORAL at 17:55

## 2018-07-17 RX ADMIN — LITHIUM CARBONATE 300 MG: 300 CAPSULE, GELATIN COATED ORAL at 09:42

## 2018-07-17 RX ADMIN — TRAZODONE HYDROCHLORIDE 50 MG: 50 TABLET ORAL at 21:53

## 2018-07-17 RX ADMIN — DIPHENHYDRAMINE HYDROCHLORIDE 25 MG: 25 TABLET ORAL at 17:56

## 2018-07-17 RX ADMIN — DIPHENHYDRAMINE HYDROCHLORIDE 25 MG: 25 TABLET ORAL at 09:42

## 2018-07-17 RX ADMIN — PERPHENAZINE 16 MG: 4 TABLET, FILM COATED ORAL at 09:42

## 2018-07-17 RX ADMIN — LITHIUM CARBONATE 300 MG: 300 CAPSULE, GELATIN COATED ORAL at 17:56

## 2018-07-17 RX ADMIN — BENZTROPINE MESYLATE 1 MG: 1 TABLET ORAL at 09:42

## 2018-07-17 NOTE — PLAN OF CARE
Alteration in Thoughts and Perception     Treatment Goal: Gain control of psychotic behaviors/thinking, reduce/eliminate presenting symptoms and demonstrate improved reality functioning upon discharge Progressing     Refrain from acting on delusional thinking/internal stimuli Progressing     Recognize dysfunctional thoughts, communicate reality-based thoughts at the time of discharge Progressing        Anxiety     [facility] is at [facility] level [facility] to post-acute care or home with appropriate resources Progressing        Ineffective Coping     Participates in unit activities Progressing        Nutrition/Hydration-ADULT     Nutrient/Hydration intake appropriate for improving, restoring or maintaining nutritional needs Progressing        PSYCHOSIS     Will report no hallucinations or delusions Progressing        Risk for Violence/Aggression Toward Others     Treatment Goal: Refrain from acts of violence/aggression during length of stay, and demonstrate improved impulse control at the time of discharge Progressing     Refrain from destructive acts on the environment or property Progressing

## 2018-07-17 NOTE — PROGRESS NOTES
Pt has been visible in the community this shift and attending programming  Among peers but not observed socializing  Pt presents for assessment unkempt  Eye contact fleeting  Reports active auditory hallucinations but states, "I hope they are going to keep getting quieter"  Pt reports belief he could tolerate these hallucinations, forever, if necessary  Also informs he would be okay in the community with such  Pt denies SI  Denies agitation  Is med compliant  PRN trazodone was administered by pt request for sleep  Pt is able to make needs known and agrees to report changes in condition/needs to staff  Nursing will continue to monitor and assess for changes and safety with 15 minute checks

## 2018-07-17 NOTE — PROGRESS NOTES
Pt presents with poor eye contact and preoccupation  States he is unsure if he feels any improvement with med changes  Pt has been isolative; encouraged to utilize groups on unit  No acute questions or concerns at this time

## 2018-07-17 NOTE — PROGRESS NOTES
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)    Unspecified mood (affective) disorder (HCC)    Amphetamine abuse      Behavior over the last 24 hours:    "The voices are starting to get a little quieter I think " He is tolerating the continued perphenazine titration and is tolerating the trial of lithium with no reported side effects  He has been cooperative with staff, with no reported agitation  He has been attending groups  Sleep: normal  Appetite: normal  Medication side effects: No  ROS: no complaints    Mental Status Evaluation:  Appearance:  age appropriate and disheveled   Behavior:  guarded   Speech:  soft   Mood:  anxious and depressed   Affect:  constricted   Thought Process:  coherent, though appears blocked at times   Thought Content:  delusions  persecutory   Perceptual Disturbances: Auditory hallucinations without commands   Risk Potential: Denies SI/HI   Sensorium:  person, place and time/date   Cognition:  grossly intact   Consciousness:  alert and awake    Attention: attention span appeared shorter than expected for age   Insight:  limited   Judgment: limited   Gait/Station: normal gait/station   Motor Activity: no abnormal movements     Progress Toward Goals: Ongoing    Recommended Treatment: Continue with group therapy, milieu therapy and occupational therapy  Risks, benefits and possible side effects of Medications:   Risks, benefits, and possible side effects of medications explained to patient and patient verbalizes understanding        Medications:   all current active meds have been reviewed, continue current psychiatric medications and current meds:   Current Facility-Administered Medications   Medication Dose Route Frequency    acetaminophen (TYLENOL) tablet 650 mg  650 mg Oral Q4H PRN    aluminum-magnesium hydroxide-simethicone (MYLANTA) 200-200-20 mg/5 mL oral suspension 30 mL  30 mL Oral Q4H PRN    benztropine (COGENTIN) tablet 1 mg  1 mg Oral BID    chlorproMAZINE (THORAZINE) injection SOLN 25 mg  25 mg Intramuscular Q6H PRN    chlorproMAZINE (THORAZINE) tablet 25 mg  25 mg Oral Q6H PRN    diphenhydrAMINE (BENADRYL) injection 50 mg  50 mg Intramuscular Q4H PRN    diphenhydrAMINE (BENADRYL) tablet 25 mg  25 mg Oral BID    diphenhydrAMINE (BENADRYL) tablet 50 mg  50 mg Oral Q4H PRN    levothyroxine tablet 50 mcg  50 mcg Oral Early Morning    lithium carbonate capsule 300 mg  300 mg Oral BID With Meals    magnesium hydroxide (MILK OF MAGNESIA) 400 mg/5 mL oral suspension 30 mL  30 mL Oral Daily PRN    nicotine (NICODERM CQ) 21 mg/24 hr TD 24 hr patch 21 mg  21 mg Transdermal Daily    perphenazine tablet 16 mg  16 mg Oral BID    traZODone (DESYREL) tablet 50 mg  50 mg Oral HS PRN     Labs: Reviewed  Lithium level, CBC, CMP ordered for 7/20/18 at 0600 and every 5 days thereafter until discharge  Counseling / Coordination of Care  Total floor / unit time spent today 25 minutes  Greater than 50% of total time was spent with the patient and / or family counseling and / or coordination of care   A description of the counseling / coordination of care: 25

## 2018-07-18 PROCEDURE — 99231 SBSQ HOSP IP/OBS SF/LOW 25: CPT | Performed by: PHYSICIAN ASSISTANT

## 2018-07-18 RX ADMIN — LEVOTHYROXINE SODIUM 50 MCG: 25 TABLET ORAL at 06:08

## 2018-07-18 RX ADMIN — BENZTROPINE MESYLATE 1 MG: 1 TABLET ORAL at 08:33

## 2018-07-18 RX ADMIN — DIPHENHYDRAMINE HYDROCHLORIDE 25 MG: 25 TABLET ORAL at 18:15

## 2018-07-18 RX ADMIN — PERPHENAZINE 16 MG: 4 TABLET, FILM COATED ORAL at 18:13

## 2018-07-18 RX ADMIN — PERPHENAZINE 16 MG: 4 TABLET, FILM COATED ORAL at 08:33

## 2018-07-18 RX ADMIN — LITHIUM CARBONATE 300 MG: 300 CAPSULE, GELATIN COATED ORAL at 08:33

## 2018-07-18 RX ADMIN — BENZTROPINE MESYLATE 1 MG: 1 TABLET ORAL at 21:39

## 2018-07-18 RX ADMIN — DIPHENHYDRAMINE HYDROCHLORIDE 25 MG: 25 TABLET ORAL at 08:34

## 2018-07-18 RX ADMIN — LITHIUM CARBONATE 300 MG: 300 CAPSULE, GELATIN COATED ORAL at 18:13

## 2018-07-18 NOTE — PROGRESS NOTES
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)    Unspecified mood (affective) disorder (HCC)    Amphetamine abuse      Behavior over the last 24 hours: There were no new clinical concerns reported by staff  He remains medication compliant and continues to tolerate medication adjustments without adverse effects  He is cooperative during the interview, though minimally spontaneously conversational  He reports feeling "grumpy" today  Auditory hallucinations remain present, but described as being quieter  He offered no new concerns  Sleep: normal  Appetite: normal  Medication side effects: No  ROS: no complaints    Mental Status Evaluation:  Appearance:  age appropriate, casually dressed and disheveled   Behavior:  guarded   Speech:  soft   Mood:  constricted   Affect:  constricted   Thought Process:  poverty of thoughts versus blocked   Thought Content:  less overtly delusional   Perceptual Disturbances: Auditory hallucinations without commands   Risk Potential: Denies SI/HI   Sensorium:  person, place, time/date and situation   Cognition:  grossly intact   Consciousness:  alert and awake    Attention: attention span appeared shorter than expected for age   Insight:  limited   Judgment: limited   Gait/Station: normal gait/station   Motor Activity: no abnormal movements     Progress Toward Goals: Ongoing    Recommended Treatment: Continue with group therapy, milieu therapy and occupational therapy  Risks, benefits and possible side effects of Medications:   Risks, benefits, and possible side effects of medications explained to patient and patient verbalizes understanding        Medications:   all current active meds have been reviewed, continue current psychiatric medications and current meds:   Current Facility-Administered Medications   Medication Dose Route Frequency    acetaminophen (TYLENOL) tablet 650 mg  650 mg Oral Q4H PRN    aluminum-magnesium hydroxide-simethicone (MYLANTA) 200-200-20 mg/5 mL oral suspension 30 mL  30 mL Oral Q4H PRN    benztropine (COGENTIN) tablet 1 mg  1 mg Oral BID    chlorproMAZINE (THORAZINE) injection SOLN 25 mg  25 mg Intramuscular Q6H PRN    chlorproMAZINE (THORAZINE) tablet 25 mg  25 mg Oral Q6H PRN    diphenhydrAMINE (BENADRYL) injection 50 mg  50 mg Intramuscular Q4H PRN    diphenhydrAMINE (BENADRYL) tablet 25 mg  25 mg Oral BID    diphenhydrAMINE (BENADRYL) tablet 50 mg  50 mg Oral Q4H PRN    levothyroxine tablet 50 mcg  50 mcg Oral Early Morning    lithium carbonate capsule 300 mg  300 mg Oral BID With Meals    magnesium hydroxide (MILK OF MAGNESIA) 400 mg/5 mL oral suspension 30 mL  30 mL Oral Daily PRN    nicotine (NICODERM CQ) 21 mg/24 hr TD 24 hr patch 21 mg  21 mg Transdermal Daily    perphenazine tablet 16 mg  16 mg Oral BID    traZODone (DESYREL) tablet 50 mg  50 mg Oral HS PRN     Labs: Reviewed  Lithium level to be drawn on 7/20  Counseling / Coordination of Care  Total floor / unit time spent today 25 minutes  Greater than 50% of total time was spent with the patient and / or family counseling and / or coordination of care   A description of the counseling / coordination of care: 25

## 2018-07-18 NOTE — PROGRESS NOTES
Pt has been mariely smooth cooperative during the day, pt denies current si/hi , pt still has auditory hallucinations, but reports that they aren't bothersome at this point  Pt has been medication and meal complaint, pt denies any current issues at this time

## 2018-07-18 NOTE — SOCIAL WORK
SW met with patient for clinical update  The patient was observed in his room laying in his bed  The patient was somewhat guarded but willing to engage in interaction  Reported ongoing AH though "quieter and less frequent " Stated that the voices "mostly make fun of me " Does admit to visual hallucinations of a 'crab franca'  Described the image as a crab that "runs around here and there"; states it does wear a crown  Denied SI/HI  Has not made any attempt to leave  Uncertain about post d/c plans, stating that his brothers have been "screwing it up, especially one " When asked if he has spoken with them, he stated "yes, through the voices " Stated that he has not always heard voices; "they just showed up one day about 5 months ago " Reported heavy methamphetamine use for years  Does remain vague and guarded; insight/judgement impaired  Will continue to monitor/assess and reinforce positive gains

## 2018-07-18 NOTE — PROGRESS NOTES
Withdrawn to room  No issues  Continues to have auditory hallucination  Maintained on Q 15 minute safety checks  No acting out, suicidal ideations, and/or homicidal behaviors  No changes in medical condition or complaints voiced  Fluids maintained at bedside to promote hydration

## 2018-07-19 PROCEDURE — 99232 SBSQ HOSP IP/OBS MODERATE 35: CPT | Performed by: PSYCHIATRY & NEUROLOGY

## 2018-07-19 RX ORDER — PERPHENAZINE 16 MG/1
16 TABLET, FILM COATED ORAL EVERY MORNING
Status: DISCONTINUED | OUTPATIENT
Start: 2018-07-20 | End: 2018-08-08 | Stop reason: HOSPADM

## 2018-07-19 RX ADMIN — BENZTROPINE MESYLATE 1 MG: 1 TABLET ORAL at 08:26

## 2018-07-19 RX ADMIN — DIPHENHYDRAMINE HYDROCHLORIDE 25 MG: 25 TABLET ORAL at 18:21

## 2018-07-19 RX ADMIN — LITHIUM CARBONATE 300 MG: 300 CAPSULE, GELATIN COATED ORAL at 08:26

## 2018-07-19 RX ADMIN — PERPHENAZINE 16 MG: 4 TABLET, FILM COATED ORAL at 08:26

## 2018-07-19 RX ADMIN — LITHIUM CARBONATE 300 MG: 300 CAPSULE, GELATIN COATED ORAL at 18:20

## 2018-07-19 RX ADMIN — BENZTROPINE MESYLATE 1 MG: 1 TABLET ORAL at 21:18

## 2018-07-19 RX ADMIN — PERPHENAZINE 20 MG: 16 TABLET, FILM COATED ORAL at 18:20

## 2018-07-19 RX ADMIN — DIPHENHYDRAMINE HYDROCHLORIDE 25 MG: 25 TABLET ORAL at 08:26

## 2018-07-19 RX ADMIN — LEVOTHYROXINE SODIUM 50 MCG: 25 TABLET ORAL at 05:59

## 2018-07-19 RX ADMIN — TRAZODONE HYDROCHLORIDE 50 MG: 50 TABLET ORAL at 21:23

## 2018-07-19 NOTE — PLAN OF CARE
Problem: Ineffective Coping  Goal: Participates in unit activities  Interventions:  - Provide therapeutic environment   - Provide required programming   - Redirect inappropriate behaviors     Outcome: Progressing  PT attends some art therapy groups  PT usually attends afternoon art therapy groups and walks in and out of group

## 2018-07-19 NOTE — SOCIAL WORK
SW met with patient to review and sign weekly treatment plan  Pt states,"When am I getting out of here"  SW discussed with patient the physician determines discharge  Pt to discuss with physician

## 2018-07-19 NOTE — PROGRESS NOTES
Pt has been minimally visible in the community  When watching TV is sitting alone  Attended very little evening programming  Retreated to room following evening snack  Presents clean but disheveled  Fleeting eye contact  Pt denies changes in condition  Reports AH remain but are quiet right now  Informs he continues to use exercise when the voices are at their worst  Pt reports feeling safe on the unit  Denies SI/HI  Is compliant with HS cogentin  Informs he wants to try sleeping without trazodone  Pt is able to make needs known and agrees to report changes in condition/needs to staff  Nursing will continue to monitor and assess for changes and safety with 15 minute checks

## 2018-07-19 NOTE — PROGRESS NOTES
Pt is and has been sleeping soundly through the night, without any PRN medication for sleep  No periods of waking have been observed  No acute concerns have been noted  15 minute checks have been maintained incident free   Will continue to monitor

## 2018-07-19 NOTE — PROGRESS NOTES
Progress Note - [address] 33 y o  male MRN: @MRN   Unit/Bed#: [facility]-92 Encounter: [id number]        The patient was seen for continuing care and reviewed with staff  Report from staff regarding this patient received and discussed, and records reviewed prior to seeing this patient   Still psychotic, preoccupied with auditory hallucinations, paranoid and delusional   Voices of brothers - "negative"  The "voice" of oldest brother tells the patient wants to kills him  Patient denied any suicidal or homicidal ideations toward his brothers  He stated adjust to want to stay has far away from them as I can  Sleep is improved  Appetite normal    Patient remains anxious, appropriate, bizarre, calm, distressed and doing better today  Medication side effects:no complaints  ROS: No     Mental Status Evaluation:    Appearance:  dressed appropriately, casually dressed   Behavior:  cooperative, calm   Mood:  anxious   Affect: constricted    Speech:  decreased rate   Language: appropriate   Thought Process:  concrete   Associations: concrete associations   Thought Content:  paranoid ideation   Perceptual Disturbances: auditory hallucinations without commands and with negative comments   Risk Potential: Suicidal ideation - None, contracts for safety on the unit  Homicidal ideation - None  Potential for aggression - No   Sensorium:  oriented to person, place and time   Memory:  recent and remote memory grossly intact   Consciousness:  alert and awake   Attention: attention span and concentration are normal   Intellect: not examined   Fund of Knowledge: awareness of current events appropriate   Insight:  impaired due to psychosis   Judgment: impaired due to psychosis   Muscle Tone: normal   Gait/Station: normal gait/station and normal balance   Motor Activity: no abnormal movements         Laboratory results:  I have personally reviewed all pertinent laboratory results      No results for input(s): HGBA1C, NA, K, CL, CO2, GLUCOSE, CREATININE, BUN, MG, PHOS in the last 72 hours  Invalid input(s): CA  No results for input(s): WBC, RBC, HGB, HCT, MCV, MCH, RDW, PLT in the last 72 hours  No results for input(s): CREATININE, BUN, NA, K, CL, CO2, GLUCOSE, PROT, ALT, AST, BILIDIR in the last 72 hours  Invalid input(s): CA, AKLPHOS  No results for input(s): ALKPHOS, AST, ALT, GGT, BILITOT, BILIDIR, ALBUMIN, INR, AMYLASE, LIPASE in the last 72 hours  No results for input(s): TROPONINI, CKMB, CKTOTAL in the last 72 hours  Invalid input(s): PBNP  No results for input(s): CHOL, LDLDIRECT, HDL, TRIG in the last 72 hours  No results for input(s): CRP, SEDRATE, SHANTHI, HAV, HEPAIGM, HEPBIGM, HEPBCAB, HEPCAB in the last 72 hours  Invalid input(s): HEAG    CBC: No results for input(s): WBC, RBC, HGB, HCT, PLT in the last 72 hours  BMP: No results for input(s): NA, K, CL, CO2, BUN, GLU in the last 72 hours  Invalid input(s): CREA        Progress Toward Goals: no significant improvement    Assessment/Plan   Principal Problem:    Schizoaffective disorder, bipolar type (HCC)  Active Problems:    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)    Unspecified mood (affective) disorder (HCC)    Amphetamine abuse      Recommended Treatment:  Will slightly increase the patient's perphenazine at bedtime to 20 mg to further address his treatment resistant psychosis was prominent auditory hallucinations, will continue morning perphenazine the same, 16 mg and continue the rest of the patient medications  Planned medication and treatment changes: All current active medications have been reviewed  Continue treatment with group therapy, milieu therapy, occupational therapy and medication management        Risks / Benefits of Treatment:    Risks, benefits, and possible side effects of medications explained to patient and patient verbalizes understanding and agreement for treatment  Counseling / Coordination of Care:    Patient's progress discussed with staff in treatment team meeting  Medication changes reviewed with staff in treatment team meeting  ** Please Note: This note has been constructed using a voice recognition system   **

## 2018-07-20 LAB
ALBUMIN SERPL BCP-MCNC: 4.4 G/DL (ref 3.5–5.7)
ALP SERPL-CCNC: 59 U/L (ref 40–150)
ALT SERPL W P-5'-P-CCNC: 15 U/L (ref 7–52)
ANION GAP SERPL CALCULATED.3IONS-SCNC: 6 MMOL/L (ref 4–13)
AST SERPL W P-5'-P-CCNC: 12 U/L (ref 13–39)
BASOPHILS # BLD AUTO: 0 THOUSANDS/ΜL (ref 0–0.1)
BASOPHILS NFR BLD AUTO: 1 % (ref 0–2)
BILIRUB SERPL-MCNC: 0.3 MG/DL (ref 0.2–1)
BUN SERPL-MCNC: 21 MG/DL (ref 7–25)
CALCIUM SERPL-MCNC: 9.9 MG/DL (ref 8.6–10.5)
CHLORIDE SERPL-SCNC: 101 MMOL/L (ref 98–107)
CO2 SERPL-SCNC: 31 MMOL/L (ref 21–31)
CREAT SERPL-MCNC: 1.06 MG/DL (ref 0.7–1.3)
EOSINOPHIL # BLD AUTO: 0.1 THOUSAND/ΜL (ref 0–0.61)
EOSINOPHIL NFR BLD AUTO: 2 % (ref 0–5)
ERYTHROCYTE [DISTWIDTH] IN BLOOD BY AUTOMATED COUNT: 12.8 % (ref 11.5–14.5)
GFR SERPL CREATININE-BSD FRML MDRD: 93 ML/MIN/1.73SQ M
GLUCOSE SERPL-MCNC: 89 MG/DL (ref 65–99)
HCT VFR BLD AUTO: 44.6 % (ref 36.5–49.3)
HGB BLD-MCNC: 15.7 G/DL (ref 14–18)
LITHIUM SERPL-SCNC: 0.5 MMOL/L (ref 1–1.2)
LYMPHOCYTES # BLD AUTO: 1.6 THOUSANDS/ΜL (ref 0.6–4.47)
LYMPHOCYTES NFR BLD AUTO: 21 % (ref 21–51)
MCH RBC QN AUTO: 31.2 PG (ref 26–34)
MCHC RBC AUTO-ENTMCNC: 35.2 G/DL (ref 31–37)
MCV RBC AUTO: 89 FL (ref 81–99)
MONOCYTES # BLD AUTO: 0.7 THOUSAND/ΜL (ref 0.17–1.22)
MONOCYTES NFR BLD AUTO: 9 % (ref 2–12)
NEUTROPHILS # BLD AUTO: 5.2 THOUSANDS/ΜL (ref 1.4–6.5)
NEUTS SEG NFR BLD AUTO: 68 % (ref 42–75)
NRBC BLD AUTO-RTO: 0 /100 WBCS
PLATELET # BLD AUTO: 225 THOUSANDS/UL (ref 149–390)
PMV BLD AUTO: 8.1 FL (ref 8.6–11.7)
POTASSIUM SERPL-SCNC: 4.1 MMOL/L (ref 3.5–5.5)
PROT SERPL-MCNC: 7.1 G/DL (ref 6.4–8.9)
RBC # BLD AUTO: 5.03 MILLION/UL (ref 4.3–5.9)
SODIUM SERPL-SCNC: 138 MMOL/L (ref 134–143)
WBC # BLD AUTO: 7.8 THOUSAND/UL (ref 4.8–10.8)

## 2018-07-20 PROCEDURE — 99232 SBSQ HOSP IP/OBS MODERATE 35: CPT | Performed by: PSYCHIATRY & NEUROLOGY

## 2018-07-20 PROCEDURE — 80053 COMPREHEN METABOLIC PANEL: CPT | Performed by: PHYSICIAN ASSISTANT

## 2018-07-20 PROCEDURE — 80178 ASSAY OF LITHIUM: CPT | Performed by: NURSE PRACTITIONER

## 2018-07-20 PROCEDURE — 85025 COMPLETE CBC W/AUTO DIFF WBC: CPT | Performed by: PHYSICIAN ASSISTANT

## 2018-07-20 RX ADMIN — PERPHENAZINE 16 MG: 16 TABLET, FILM COATED ORAL at 08:49

## 2018-07-20 RX ADMIN — DIPHENHYDRAMINE HYDROCHLORIDE 25 MG: 25 TABLET ORAL at 17:42

## 2018-07-20 RX ADMIN — CHLORPROMAZINE HYDROCHLORIDE 25 MG: 25 TABLET, SUGAR COATED ORAL at 20:09

## 2018-07-20 RX ADMIN — DIPHENHYDRAMINE HYDROCHLORIDE 25 MG: 25 TABLET ORAL at 08:49

## 2018-07-20 RX ADMIN — LITHIUM CARBONATE 300 MG: 300 CAPSULE, GELATIN COATED ORAL at 17:42

## 2018-07-20 RX ADMIN — BENZTROPINE MESYLATE 1 MG: 1 TABLET ORAL at 08:49

## 2018-07-20 RX ADMIN — LITHIUM CARBONATE 300 MG: 300 CAPSULE, GELATIN COATED ORAL at 08:49

## 2018-07-20 RX ADMIN — PERPHENAZINE 20 MG: 16 TABLET, FILM COATED ORAL at 17:42

## 2018-07-20 RX ADMIN — TRAZODONE HYDROCHLORIDE 50 MG: 50 TABLET ORAL at 20:06

## 2018-07-20 RX ADMIN — LEVOTHYROXINE SODIUM 50 MCG: 25 TABLET ORAL at 06:51

## 2018-07-20 RX ADMIN — BENZTROPINE MESYLATE 1 MG: 1 TABLET ORAL at 20:03

## 2018-07-20 NOTE — PLAN OF CARE
Problem: Ineffective Coping  Goal: Participates in unit activities  Interventions:  - Provide therapeutic environment   - Provide required programming   - Redirect inappropriate behaviors     Outcome: Progressing  PT attends some art therapy groups, usually in the afternoon  When PT attends art therapy groups attention is poor and walks in and out of group frequently  PT does not interact with peers much, but will interact with staff  PT works on the same coloring sheet when attending art therapy groups

## 2018-07-20 NOTE — PLAN OF CARE
Alteration in Thoughts and Perception     Treatment Goal: Gain control of psychotic behaviors/thinking, reduce/eliminate presenting symptoms and demonstrate improved reality functioning upon discharge Not Progressing     Recognize dysfunctional thoughts, communicate reality-based thoughts at the time of discharge Not [address] Discharge to post-acute care or home with appropriate resources Not Progressing        Ineffective Coping     Participates in unit activities Not Progressing        Nutrition/Hydration-ADULT     Nutrient/Hydration intake appropriate for improving, restoring or maintaining nutritional needs Not Progressing        PSYCHOSIS     Will report no hallucinations or delusions Not Progressing          Alteration in Thoughts and Perception     Refrain from acting on delusional thinking/internal stimuli Progressing        Anxiety     Anxiety is at manageable level Progressing        Prexisting or High Potential for Compromised Skin Integrity     Skin integrity is maintained or improved Progressing        Risk for Violence/Aggression Toward Others     Treatment Goal: Refrain from acts of violence/aggression during length of stay, and demonstrate improved impulse control at the time of discharge Progressing     Refrain from destructive acts on the environment or property Progressing

## 2018-07-20 NOTE — PROGRESS NOTES
No aggressive behaviors noted  Patient observed sleeping during most Q15 minute safety checks (second portion of shift)  No suicidal ideations, acting out or homicidal behaviors  No change in medical condition or complaints voiced  Fluids maintained at bedside to promote hydration

## 2018-07-20 NOTE — PROGRESS NOTES
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)    Unspecified mood (affective) disorder (HCC)    Amphetamine abuse      Behavior over the last 24 hours:    Staff reports that he was agreeable to labwork this morning but indicated that he does not want to have blood drawn often  He has been telling staff that the voices are not bothering him as much  He was cooperative during the interview today  He states, "My brother is being a jenni again " He reports the voices are annoying him today  He denies suicidal thoughts today, but is unsure if his mood is any better  After bringing up the need to begin discussing a discharge plan, he became more guarded and irritable  He states he doesn't know where he will go  "No one wants me "  Sleep: normal  Appetite: normal  Medication side effects: No  ROS: no complaints    Mental Status Evaluation:  Appearance:  age appropriate, casually dressed and disheveled   Behavior:  evasive and guarded   Speech:  soft and minimally conversational   Mood:  constricted and irritable   Affect:  constricted   Thought Process:  poverty of thoughts   Thought Content:  delusions  persecutory   Perceptual Disturbances: Non-command auditory hallucinations   Risk Potential: Denies SI/HI   Sensorium:  person, place, time/date and situation   Cognition:  grossly intact   Consciousness:  alert and awake    Attention: attention span appeared shorter than expected for age   Insight:  impaired   Judgment: impaired   Gait/Station: normal gait/station   Motor Activity: no abnormal movements     Progress Toward Goals: Slow    Recommended Treatment: Continue with group therapy, milieu therapy and occupational therapy        Risks, benefits and possible side effects of Medications:   Risks, benefits, and possible side effects of medications explained to patient and patient verbalizes understanding  Medications:   all current active meds have been reviewed, continue current psychiatric medications, current meds:   Current Facility-Administered Medications   Medication Dose Route Frequency    acetaminophen (TYLENOL) tablet 650 mg  650 mg Oral Q4H PRN    aluminum-magnesium hydroxide-simethicone (MYLANTA) 200-200-20 mg/5 mL oral suspension 30 mL  30 mL Oral Q4H PRN    benztropine (COGENTIN) tablet 1 mg  1 mg Oral BID    chlorproMAZINE (THORAZINE) injection SOLN 25 mg  25 mg Intramuscular Q6H PRN    chlorproMAZINE (THORAZINE) tablet 25 mg  25 mg Oral Q6H PRN    diphenhydrAMINE (BENADRYL) injection 50 mg  50 mg Intramuscular Q4H PRN    diphenhydrAMINE (BENADRYL) tablet 25 mg  25 mg Oral BID    diphenhydrAMINE (BENADRYL) tablet 50 mg  50 mg Oral Q4H PRN    levothyroxine tablet 50 mcg  50 mcg Oral Early Morning    lithium carbonate capsule 300 mg  300 mg Oral BID With Meals    magnesium hydroxide (MILK OF MAGNESIA) 400 mg/5 mL oral suspension 30 mL  30 mL Oral Daily PRN    nicotine (NICODERM CQ) 21 mg/24 hr TD 24 hr patch 21 mg  21 mg Transdermal Daily    perphenazine tablet 16 mg  16 mg Oral QAM    perphenazine tablet 20 mg  20 mg Oral After Dinner    traZODone (DESYREL) tablet 50 mg  50 mg Oral HS PRN      Will adjust Lithium dose pending results of level drawn this morning  Labs: Reviewed  Lithium level results pending  Counseling / Coordination of Care  Total floor / unit time spent today 25 minutes  Greater than 50% of total time was spent with the patient and / or family counseling and / or coordination of care   A description of the counseling / coordination of care: 25

## 2018-07-20 NOTE — PROGRESS NOTES
Auditory hallucinations remain  Pleasant but withdrawn to room  Maintained on Q 15 minute safety checks  No acting out, suicidal ideations, and/or homicidal behaviors  No changes in medical condition or complaints voiced  Fluids maintained at bedside to promote hydration

## 2018-07-20 NOTE — PROGRESS NOTES
Patient bright, alert, ate breakfast , back to bed after eating  Patient remains mostly withdrawn to self but will speak to staff when prompted, flat affect, good hygiene, good eye contact  Patient c/o auditory hallucinations and that they are "bothering him today"  Patient was compliant with bloodraw after education  Providers aware patient admits to not wanting future bloodraws and currently prescribed lithium  Will maintain on safety precautions and 15 minute checks  No needs identified

## 2018-07-21 PROCEDURE — 99232 SBSQ HOSP IP/OBS MODERATE 35: CPT | Performed by: PSYCHIATRY & NEUROLOGY

## 2018-07-21 RX ADMIN — DIPHENHYDRAMINE HYDROCHLORIDE 25 MG: 25 TABLET ORAL at 17:25

## 2018-07-21 RX ADMIN — PERPHENAZINE 16 MG: 16 TABLET, FILM COATED ORAL at 08:47

## 2018-07-21 RX ADMIN — LEVOTHYROXINE SODIUM 50 MCG: 25 TABLET ORAL at 06:13

## 2018-07-21 RX ADMIN — BENZTROPINE MESYLATE 1 MG: 1 TABLET ORAL at 08:47

## 2018-07-21 RX ADMIN — LITHIUM CARBONATE 450 MG: 300 CAPSULE, GELATIN COATED ORAL at 17:25

## 2018-07-21 RX ADMIN — PERPHENAZINE 20 MG: 16 TABLET, FILM COATED ORAL at 17:25

## 2018-07-21 RX ADMIN — DIPHENHYDRAMINE HYDROCHLORIDE 25 MG: 25 TABLET ORAL at 08:47

## 2018-07-21 RX ADMIN — TRAZODONE HYDROCHLORIDE 50 MG: 50 TABLET ORAL at 21:23

## 2018-07-21 RX ADMIN — LITHIUM CARBONATE 300 MG: 300 CAPSULE, GELATIN COATED ORAL at 08:47

## 2018-07-21 RX ADMIN — BENZTROPINE MESYLATE 1 MG: 1 TABLET ORAL at 21:23

## 2018-07-21 NOTE — PROGRESS NOTES
Progress Note - [address] 33 y o  male MRN: [id number]  Unit/Bed#: [facility] 220-02 Encounter: [id number]    The patient was seen for continuing care and reviewed with treatment team     Mental Status Evaluation:  Appearance:  Marginal/poor hygiene   Behavior:  psychomotor retardation   Mood:  dysphoric   Affect: blunted   Speech: Increased latency of response   Thought Process:  Poverty of thoughts   Thought Content:  Paranoid and mistrustful   Perceptual Disturbances: Auditory hallucinations without commands   Risk Potential: No suicidal or homicidal ideation   Attention/Concentration attention span appeared shorter than expected for age   Orientation:   Oriented x 3   Gait/Station: normal gait/station   Motor Activity: No abnormal movement noted     Progress Toward Goals:  Patient reports increase in auditory hallucinations states hallucinations are mostly derogatory not command  He did have a lithium level yesterday was 0 5, will increase his lithium 450 mg b i d  He did have an increase in his perphenazine which started this morning  He still isolative to his room often with the covers over his head, states sleep appetite are fair, affect is blunted  He is hopeless, helpless, and  Homeless  Will continue to monitor psychosis make med changes necessary  Assessment/Plan    Principal Problem:    Schizoaffective disorder, bipolar type (HCC)  Active Problems:    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)    Unspecified mood (affective) disorder (HCC)    Amphetamine abuse      Recommended Treatment: Continue with pharmacotherapy, group therapy, milieu therapy and occupational therapy    The patient will be maintained on the following medications:    Current Facility-Administered Medications:  acetaminophen 650 mg Oral Q4H PRN Provider Cutover   aluminum-magnesium hydroxide-simethicone 30 mL Oral Q4H PRN Provider Cutover   benztropine 1 mg Oral BID Provider Cutover   chlorproMAZINE 25 mg Intramuscular Q6H PRN Provider Cutover   chlorproMAZINE 25 mg Oral Q6H PRN Provider Cutover   diphenhydrAMINE 50 mg Intramuscular Q4H PRN Provider Cutover   diphenhydrAMINE 25 mg Oral BID BRIDGET Kuhn   diphenhydrAMINE 50 mg Oral Q4H PRN Provider Cutover   levothyroxine 50 mcg Oral Early Morning Provider Cutover   lithium carbonate 450 mg Oral BID With Meals BRIDGET Cosme   magnesium hydroxide 30 mL Oral Daily PRN Provider Cutover   nicotine 21 mg Transdermal Daily Provider Cutover   perphenazine 16 mg Oral QAM Cathleen Lucas MD   perphenazine 20 mg Oral After Jaxson Cazares MD   traZODone 50 mg Oral HS PRN Provider Cutover       Risks, benefits and possible side effects of Medications:   Risks, benefits, and possible side effects of medications explained to patient and patient verbalizes understanding

## 2018-07-21 NOTE — PROGRESS NOTES
Pt has been calm and cooperative during the day  Pt denies current si/hi at this time  Pt does report auditory hallucinations and reports that they are bothering him  Pt was told that if he has any trouble to let staff know, pt made appropriate eye contact during the day

## 2018-07-21 NOTE — PROGRESS NOTES
Patient withdrawn to his room for most of the shift  Denies s/i but says he is very depressed today  Reports that the voices have increased and are calling him insulting names such as "pedophile "  Says it has become harder to tune them out and he finds this very depressing  Patient was given prn thorazine for auditory hallucinations in addition to his scheduled cogentin    Also received prn trazodone for sleep

## 2018-07-22 PROCEDURE — 99231 SBSQ HOSP IP/OBS SF/LOW 25: CPT | Performed by: PSYCHIATRY & NEUROLOGY

## 2018-07-22 RX ADMIN — DIPHENHYDRAMINE HYDROCHLORIDE 25 MG: 25 TABLET ORAL at 08:39

## 2018-07-22 RX ADMIN — BENZTROPINE MESYLATE 1 MG: 1 TABLET ORAL at 08:39

## 2018-07-22 RX ADMIN — LEVOTHYROXINE SODIUM 50 MCG: 25 TABLET ORAL at 06:16

## 2018-07-22 RX ADMIN — LITHIUM CARBONATE 450 MG: 300 CAPSULE, GELATIN COATED ORAL at 08:38

## 2018-07-22 RX ADMIN — PERPHENAZINE 16 MG: 16 TABLET, FILM COATED ORAL at 08:39

## 2018-07-22 RX ADMIN — LITHIUM CARBONATE 450 MG: 300 CAPSULE, GELATIN COATED ORAL at 17:45

## 2018-07-22 RX ADMIN — TRAZODONE HYDROCHLORIDE 50 MG: 50 TABLET ORAL at 20:44

## 2018-07-22 RX ADMIN — BENZTROPINE MESYLATE 1 MG: 1 TABLET ORAL at 20:39

## 2018-07-22 RX ADMIN — DIPHENHYDRAMINE HYDROCHLORIDE 25 MG: 25 TABLET ORAL at 17:45

## 2018-07-22 RX ADMIN — PERPHENAZINE 20 MG: 16 TABLET, FILM COATED ORAL at 17:45

## 2018-07-22 NOTE — PROGRESS NOTES
Patient spent most of the evening withdrawn to his room  Lightly sleeping off and on but wakes easily  Denies s/i or h/i  Reports that his auditory hallucinations are "not too bad right now "  Requested prn trazodone along with his schedules HS cogentin  Calm, controlled, and cooperative

## 2018-07-22 NOTE — PROGRESS NOTES
Pt has been medication and meal compliant, pt denies current si/hi at this time  Pt has been letting his needs be known throughout the day  Pt is currently withdrawn resting in his room  The patient has been getting up later in the day to attend to his adls and daily needs  Pt still reports hallucinations, but denies then being irritating for him at this time

## 2018-07-22 NOTE — PROGRESS NOTES
Progress Note - [address] 33 y o  male MRN: [id number]  Unit/Bed#: [facility] 220-02 Encounter: [id number]    The patient was seen for continuing care and reviewed with treatment team     Mental Status Evaluation:  Appearance:  Marginal/poor hygiene   Behavior:  calm and cooperative   Mood:  depressed and dysphoric   Affect: blunted   Speech: Sparse and Soft   Thought Process:  Goal directed and coherent   Thought Content:  Does not verbalize delusional material   Perceptual Disturbances: Auditory hallucinations without commands   Risk Potential: No suicidal or homicidal ideation   Attention/Concentration attention span and concentration were age appropriate   Orientation:   Oriented x 3   Gait/Station: normal gait/station   Motor Activity: No abnormal movement noted     Progress Toward Goals: Ongoing issues with AH, worse at night, despite increase in perphenazine and lithium  Next lithium level 7/26  He is withdrawn to his room, but calm and cooperative and appropriate in conversation  Tends to be slow to get moving, poorly motivated in the morning  Assessment/Plan    Principal Problem:    Schizoaffective disorder, bipolar type (HCC)  Active Problems:    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)    Unspecified mood (affective) disorder (HCC)    Amphetamine abuse      Recommended Treatment: Continue with pharmacotherapy, group therapy, milieu therapy and occupational therapy    The patient will be maintained on the following medications:    Current Facility-Administered Medications:  acetaminophen 650 mg Oral Q4H PRN Provider Cutover   aluminum-magnesium hydroxide-simethicone 30 mL Oral Q4H PRN Provider Cutover   benztropine 1 mg Oral BID Provider Cutover   chlorproMAZINE 25 mg Intramuscular Q6H PRN Provider Cutover   chlorproMAZINE 25 mg Oral Q6H PRN Provider Cutover   diphenhydrAMINE 50 mg Intramuscular Q4H PRN Provider Cutover   diphenhydrAMINE 25 mg Oral BID BRIDGET Kuhn   diphenhydrAMINE 50 mg Oral Q4H PRN Provider Cutover   levothyroxine 50 mcg Oral Early Morning Provider Cutover   lithium carbonate 450 mg Oral BID With Meals BRIDGET Wylie   magnesium hydroxide 30 mL Oral Daily PRN Provider Cutover   nicotine 21 mg Transdermal Daily Provider Cutover   perphenazine 16 mg Oral DELFINO Joy MD   perphenazine 20 mg Oral After Anyadonnell Pabon MD   traZODone 50 mg Oral HS PRN Provider Cutover       Risks, benefits and possible side effects of Medications:   Risks, benefits, and possible side effects of medications explained to patient and patient verbalizes understanding

## 2018-07-23 PROCEDURE — 99232 SBSQ HOSP IP/OBS MODERATE 35: CPT | Performed by: PSYCHIATRY & NEUROLOGY

## 2018-07-23 RX ADMIN — PERPHENAZINE 20 MG: 16 TABLET, FILM COATED ORAL at 17:33

## 2018-07-23 RX ADMIN — DIPHENHYDRAMINE HYDROCHLORIDE 25 MG: 25 TABLET ORAL at 17:33

## 2018-07-23 RX ADMIN — LEVOTHYROXINE SODIUM 50 MCG: 25 TABLET ORAL at 06:02

## 2018-07-23 RX ADMIN — LITHIUM CARBONATE 450 MG: 300 CAPSULE, GELATIN COATED ORAL at 17:33

## 2018-07-23 RX ADMIN — BENZTROPINE MESYLATE 1 MG: 1 TABLET ORAL at 08:53

## 2018-07-23 RX ADMIN — PERPHENAZINE 16 MG: 16 TABLET, FILM COATED ORAL at 08:54

## 2018-07-23 RX ADMIN — LITHIUM CARBONATE 450 MG: 300 CAPSULE, GELATIN COATED ORAL at 08:53

## 2018-07-23 RX ADMIN — DIPHENHYDRAMINE HYDROCHLORIDE 25 MG: 25 TABLET ORAL at 08:53

## 2018-07-23 RX ADMIN — BENZTROPINE MESYLATE 1 MG: 1 TABLET ORAL at 21:20

## 2018-07-23 RX ADMIN — TRAZODONE HYDROCHLORIDE 50 MG: 50 TABLET ORAL at 21:21

## 2018-07-23 NOTE — PROGRESS NOTES
Patient bright, alert for breakfast,  back to bed after eating and remains mostly withdrawn to self  Pt denies  si and hi, compliant with medications  Pt visible on unit later in the day and had positive visit with family  Pt maintains good eye contact, good hygiene and is compliant with medications Pt discussed possible discharge this week and states to this nurse  "I Alina Peed leave soon but Im going to come back soon because they are still in the there " (while pointing to this own head) pt agrees when this confirms he is speaking of the voices  Pt states " ill just go get some fresh are for a few days and come back in"  Will maintain on safety precautions and 15 minute checks  No needs identified

## 2018-07-23 NOTE — PROGRESS NOTES
Progress Note - [address] 33 y o  male MRN: [id number]  Unit/Bed#: [facility] 220-02 Encounter: [id number]    The patient was seen for continuing care and reviewed with treatment team  He reports the [facility] are baseline ans he is able to ignore them  His behaviors are appropriate but he does have poverty of thought and speech and needs prompting to be out on the unit but does complete groups with encouragement  His rash that he received from the haloperidol is improved and he reports tolerating the current antipsychotic without any akathisia or dystonia  [facility] sister-in-law Genevieve Jones per her request     Mental Status Evaluation:  Appearance:  Good eye contact   Behavior:  cooperative and psychomotor retardation   Mood:  euthymic   Affect: Flat   Speech: Sparse   Thought Process:  Poverty of thoughts   Thought Content:  Does not verbalize delusional material   Perceptual Disturbances: Auditory hallucinations without commands   Risk Potential: Suicidal Ideations none and Homicidal Ideations none   Attention/Concentration attention span appeared shorter than expected for age   Orientation:   Oriented to person place time   Gait/Station: Not observed as he is lying in bed   Motor Activity: No abnormal movement noted     Progress Toward Goals: Auditory hallucinations appear to be at baseline he has no behaviors that would necessitate further inpatient care or involuntary commitment  Discharged to the care of family is scheduled for tomorrow  Assessment/Plan    Principal Problem:    Schizoaffective disorder, bipolar type (HCC)  Active Problems:    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)    Unspecified mood (affective) disorder (HCC)    Amphetamine abuse      Recommended Treatment: Continue with pharmacotherapy, group therapy, milieu therapy and occupational therapy    The patient will be maintained on the following medications:    Current Facility-Administered Medications:  acetaminophen 650 mg Oral Q4H PRN Provider Cutover   aluminum-magnesium hydroxide-simethicone 30 mL Oral Q4H PRN Provider Cutover   benztropine 1 mg Oral BID Provider Cutover   chlorproMAZINE 25 mg Intramuscular Q6H PRN Provider Cutover   chlorproMAZINE 25 mg Oral Q6H PRN Provider Cutover   diphenhydrAMINE 50 mg Intramuscular Q4H PRN Provider Cutover   diphenhydrAMINE 25 mg Oral BID BRIDGET Kuhn   diphenhydrAMINE 50 mg Oral Q4H PRN Provider Cutover   levothyroxine 50 mcg Oral Early Morning Provider Cutover   lithium carbonate 450 mg Oral BID With Meals BRIDGET Santos   magnesium hydroxide 30 mL Oral Daily PRN Provider Cutover   nicotine 21 mg Transdermal Daily Provider Cutover   perphenazine 16 mg Oral QAM Thu Hurley MD   perphenazine 20 mg Oral After Dakota Burroguhs MD   traZODone 50 mg Oral HS PRN Provider Cutover       Risks, benefits and possible side effects of Medications:   Risks, benefits, and possible side effects of medications explained to patient and patient verbalizes understanding

## 2018-07-23 NOTE — PLAN OF CARE
Problem: Nutrition/Hydration-ADULT  Goal: Nutrient/Hydration intake appropriate for improving, restoring or maintaining nutritional needs  Monitor and assess patient's nutrition/hydration status for malnutrition (ex- brittle hair, bruises, dry skin, pale skin and conjunctiva, muscle wasting, smooth red tongue, and disorientation)  Collaborate with interdisciplinary team and initiate plan and interventions as ordered  Monitor patient's weight and dietary intake as ordered or per policy  Utilize nutrition screening tool and intervene per policy  Determine patient's food preferences and provide high-protein, high-caloric foods as appropriate  INTERVENTIONS:  - Monitor oral intake, urinary output, labs, and treatment plans  - Assess nutrition and hydration status and recommend course of action  - Evaluate amount of meals eaten  - Assist patient with eating if necessary   - Allow adequate time for meals  - Recommend/ encourage appropriate diets, oral nutritional supplements, and vitamin/mineral supplements  - Order, calculate, and assess calorie counts as needed  - Recommend, monitor, and adjust tube feedings and TPN/PPN based on assessed needs  - Assess need for intravenous fluids  - Provide specific nutrition/hydration education as appropriate  - Include patient/family/caregiver in decisions related to nutrition    Outcome: Progressing  He is on a regular meal plan with his intakes ranging from 0 to 100 %  Staff stated that he does take the ensure  He is on MOM and Nicoderm  His weight was 192 on 7-14 then up to 193 on 7-21 with a pound increase noted  He did eat his breakfast in the dining room no c/o offered  His BG was 89 and albumin 4 4 on 7-20 which are both WNL  RDN will evaluate his labs when available  His skin is intact    RDN to reassess his nutrition needs at moderate risk and follow PRN

## 2018-07-23 NOTE — SOCIAL WORK
lizzeth advised in tx team that patient scheduled to discharge on Tuesday    lizzeth placed phone call to Donavan Zimmerman (friend) [phone] to discuss discharge planning - left message on voicemail - awaiting response

## 2018-07-23 NOTE — PLAN OF CARE
Alteration in Thoughts and Perception     Treatment Goal: Gain control of psychotic behaviors/thinking, reduce/eliminate presenting symptoms and demonstrate improved reality functioning upon discharge Not Progressing        DISCHARGE PLANNING - CARE MANAGEMENT     Discharge to post-acute care or home with appropriate resources Not Progressing        PSYCHOSIS     Will report no hallucinations or delusions Not Progressing          Alteration in Thoughts and Perception     Refrain from acting on delusional thinking/internal stimuli Progressing     Recognize dysfunctional thoughts, communicate reality-based thoughts at the time of discharge Progressing        Anxiety     Anxiety is at manageable level Progressing        Ineffective Coping     Participates in unit activities Progressing        Nutrition/Hydration-ADULT     Nutrient/Hydration intake appropriate for improving, restoring or maintaining nutritional needs Progressing

## 2018-07-23 NOTE — PLAN OF CARE
Problem: Ineffective Coping  Goal: Participates in unit activities  Interventions:  - Provide therapeutic environment   - Provide required programming   - Redirect inappropriate behaviors     Outcome: Progressing  Pt continues to attend partial art therapy groups, mostly in the afternoon  Pt usually works on an individual project and interacts with staff and peers when prompted  Pt declined participation in art therapy group this morning stating that he is tired  Pt has stated that he is still hearing voices  Pt's mood and affect has been flat and blunted  AT will continue to encourage pt to attend art therapy groups to increase socialization and for creative expression of emotions

## 2018-07-23 NOTE — DISCHARGE INSTR - APPOINTMENTS
The treatment team recommends ongoing medication management upon discharge  A referral has been made on your behalf to Dr Oneal Macias ([address], [address] Phone: [phone] Fax: [phone]) for ongoing medication management  Your appointment is scheduled for Tuesday August 28, 2018 at 10AM with Dr Kanyd Tyler  Please complete the new patient paperwork you were provided and bring with you to your appointment  It is essential that you keep this appointment in order to remain medication compliant  Your aftercare summary will be faxed to this provider  The treatment team recommends case management services  A referral has been made on your behalf to PENNY Sorenson, [address] [facility] Phone: [phone] Fax: [phone]) for blended case management services  You will be assigned a  and receive a call to schedule an intake appointment within 7 to 14 days after discharge  If you do not receive a call within 1 week please follow up with this provider  Your summary of care will be faxed to this provider  You declined a referral for substance abuse treatment and individual therapy  You identified your PCP as Dr Arline Jolley; however, you indicated that you do not want us to schedule you an appointment nor do you want your summary of care faxed

## 2018-07-23 NOTE — PROGRESS NOTES
Patient remains unchanged  Followed typical behavior pattern  Attended snack but no groups  Reports he is still having a/v hallucinations but has been able to successfully tune them out  Denies s/i  Requested prn trazodone and then retreated to his room

## 2018-07-24 PROCEDURE — 99232 SBSQ HOSP IP/OBS MODERATE 35: CPT | Performed by: PSYCHIATRY & NEUROLOGY

## 2018-07-24 RX ORDER — OXCARBAZEPINE 150 MG/1
300 TABLET, FILM COATED ORAL EVERY 12 HOURS SCHEDULED
Status: DISCONTINUED | OUTPATIENT
Start: 2018-07-24 | End: 2018-07-27

## 2018-07-24 RX ADMIN — PERPHENAZINE 20 MG: 16 TABLET, FILM COATED ORAL at 17:47

## 2018-07-24 RX ADMIN — PERPHENAZINE 16 MG: 16 TABLET, FILM COATED ORAL at 08:48

## 2018-07-24 RX ADMIN — LEVOTHYROXINE SODIUM 50 MCG: 25 TABLET ORAL at 06:08

## 2018-07-24 RX ADMIN — DIPHENHYDRAMINE HYDROCHLORIDE 25 MG: 25 TABLET ORAL at 17:47

## 2018-07-24 RX ADMIN — LITHIUM CARBONATE 450 MG: 300 CAPSULE, GELATIN COATED ORAL at 08:48

## 2018-07-24 RX ADMIN — DIPHENHYDRAMINE HYDROCHLORIDE 25 MG: 25 TABLET ORAL at 08:48

## 2018-07-24 RX ADMIN — OXCARBAZEPINE 300 MG: 150 TABLET ORAL at 13:08

## 2018-07-24 RX ADMIN — BENZTROPINE MESYLATE 1 MG: 1 TABLET ORAL at 21:33

## 2018-07-24 RX ADMIN — OXCARBAZEPINE 300 MG: 150 TABLET ORAL at 21:33

## 2018-07-24 RX ADMIN — BENZTROPINE MESYLATE 1 MG: 1 TABLET ORAL at 08:48

## 2018-07-24 NOTE — SOCIAL WORK
sw placed phone call to pt friend, Sylwia Koo, to discuss discharge plan/disposition;   Left message on voicemail - awaiting response

## 2018-07-24 NOTE — PLAN OF CARE
Alteration in Thoughts and Perception     Treatment Goal: Gain control of psychotic behaviors/thinking, reduce/eliminate presenting symptoms and demonstrate improved reality functioning upon discharge Not Progressing        Ineffective Coping     Participates in unit activities Not Progressing        PSYCHOSIS     Will report no hallucinations or delusions Not Progressing          Alteration in Thoughts and Perception     Refrain from acting on delusional thinking/internal stimuli Progressing     Recognize dysfunctional thoughts, communicate reality-based thoughts at the time of discharge Progressing        Anxiety     Anxiety is at manageable level Progressing        Nutrition/Hydration-ADULT     Nutrient/Hydration intake appropriate for improving, restoring or maintaining nutritional needs Progressing

## 2018-07-24 NOTE — PROGRESS NOTES
Patient alert, awake, ate breakfast with peers, visible on unit, but mostly withdrawn to self  Providers made aware patient has repeatedly expressed to nursing staff that he has a great dislike for needles and has often refused blood draws in hospital for medication monitoring levels  Pt continues to c/o non-command  auditory hallucinations but denies si hi  Pt remains compliant with medications and care  Will maintain on safety precautions and 15 minute checks  No needs identified

## 2018-07-24 NOTE — SOCIAL WORK
LATE ENTRY     lizzeth spoke to Meenu (ex sister in law); Meenu concerned that patient will return to her home and return to his prev behaviors;  lizzeth advised that patient is still having AH but are not command in type or threatening to him according to patient;  Meenu requested return phone call from physician to discuss medication changes and behaviors;   Meenu has 4 small children in home;  lizzeth advised that lizzeth will request physician contact friend; call mutually ended

## 2018-07-24 NOTE — PLAN OF CARE

## 2018-07-24 NOTE — PROGRESS NOTES
Patient remains unchanged from past several nights  Out briefly to watch tv and then decided to go to bed  Denies s/i but states auditory hallucinations are still present, however he feels they are currently controlled    Received prn trazodone and went to bed

## 2018-07-24 NOTE — PROGRESS NOTES
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)    Unspecified mood (affective) disorder (HCC)    Amphetamine abuse      Behavior over the last 24 hours:    Patient was tentatively set up for discharge  However, he expressed to nursing, and reiterated the same to me this morning, that he is not willing to have blood drawn as an outpatient, which is required periodically to continue on lithium  He is willing to take a medication that does not require levels  He continues to report auditory hallucinations  Sleep: normal  Appetite: normal  Medication side effects: No  ROS: no complaints    Mental Status Evaluation:  Appearance:  age appropriate and disheveled   Behavior:  catatonic and cooperative   Speech:  soft   Mood:  constricted   Affect:  constricted   Thought Process:  poverty of thoughts   Thought Content:  no reported delusional thoughts   Perceptual Disturbances: Auditory hallucinations without commands   Risk Potential: no reported SI/HI   Sensorium:  person, place and time/date   Cognition:  grossly intact   Consciousness:  alert and awake    Attention: attention span appeared shorter than expected for age   Insight:  impaired   Judgment: impaired   Gait/Station: normal gait/station   Motor Activity: no abnormal movements     Progress Toward Goals: Ongoing    Recommended Treatment: Continue with group therapy, milieu therapy and occupational therapy  Risks, benefits and possible side effects of Medications:   Risks, benefits, and possible side effects of medications explained to patient and patient verbalizes understanding        Medications:   all current active meds have been reviewed, current meds:   Current Facility-Administered Medications   Medication Dose Route Frequency    acetaminophen (TYLENOL) tablet 650 mg  650 mg Oral Q4H PRN    aluminum-magnesium hydroxide-simethicone (MYLANTA) 200-200-20 mg/5 mL oral suspension 30 mL  30 mL Oral Q4H PRN    benztropine (COGENTIN) tablet 1 mg  1 mg Oral BID    chlorproMAZINE (THORAZINE) injection SOLN 25 mg  25 mg Intramuscular Q6H PRN    chlorproMAZINE (THORAZINE) tablet 25 mg  25 mg Oral Q6H PRN    diphenhydrAMINE (BENADRYL) injection 50 mg  50 mg Intramuscular Q4H PRN    diphenhydrAMINE (BENADRYL) tablet 25 mg  25 mg Oral BID    diphenhydrAMINE (BENADRYL) tablet 50 mg  50 mg Oral Q4H PRN    levothyroxine tablet 50 mcg  50 mcg Oral Early Morning    lithium carbonate capsule 450 mg  450 mg Oral BID With Meals    magnesium hydroxide (MILK OF MAGNESIA) 400 mg/5 mL oral suspension 30 mL  30 mL Oral Daily PRN    nicotine (NICODERM CQ) 21 mg/24 hr TD 24 hr patch 21 mg  21 mg Transdermal Daily    perphenazine tablet 16 mg  16 mg Oral QAM    perphenazine tablet 20 mg  20 mg Oral After Dinner    traZODone (DESYREL) tablet 50 mg  50 mg Oral HS PRN     Discontinue lithium due to above concerns  Will start Trileptal 300mg BID  Labs: Reviewed    Counseling / Coordination of Care  Total floor / unit time spent today 25 minutes  Greater than 50% of total time was spent with the patient and / or family counseling and / or coordination of care   A description of the counseling / coordination of care: 25

## 2018-07-25 PROCEDURE — 99232 SBSQ HOSP IP/OBS MODERATE 35: CPT | Performed by: PSYCHIATRY & NEUROLOGY

## 2018-07-25 RX ADMIN — OXCARBAZEPINE 300 MG: 150 TABLET ORAL at 08:36

## 2018-07-25 RX ADMIN — BENZTROPINE MESYLATE 1 MG: 1 TABLET ORAL at 08:36

## 2018-07-25 RX ADMIN — PERPHENAZINE 16 MG: 16 TABLET, FILM COATED ORAL at 08:36

## 2018-07-25 RX ADMIN — DIPHENHYDRAMINE HYDROCHLORIDE 25 MG: 25 TABLET ORAL at 17:57

## 2018-07-25 RX ADMIN — DIPHENHYDRAMINE HYDROCHLORIDE 25 MG: 25 TABLET ORAL at 08:36

## 2018-07-25 RX ADMIN — BENZTROPINE MESYLATE 1 MG: 1 TABLET ORAL at 21:32

## 2018-07-25 RX ADMIN — PERPHENAZINE 20 MG: 16 TABLET, FILM COATED ORAL at 17:57

## 2018-07-25 RX ADMIN — OXCARBAZEPINE 300 MG: 150 TABLET ORAL at 21:32

## 2018-07-25 RX ADMIN — LEVOTHYROXINE SODIUM 50 MCG: 25 TABLET ORAL at 06:05

## 2018-07-25 NOTE — PROGRESS NOTES
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)    Unspecified mood (affective) disorder (HCC)    Amphetamine abuse      Behavior over the last 24 hours:    He has remained medication compliant  He is tolerating switch to Trileptal without side effects  He slept well  Remains minimally interactive with peers  He continues to have AH, but has told staff that he is getting better at tuning them out  He offered no new complaints  Sleep: normal  Appetite: normal  Medication side effects: No  ROS: no complaints    Mental Status Evaluation:  Appearance:  age appropriate and disheveled   Behavior:  cooperative, minimally social   Speech:  soft   Mood:  constricted   Affect:  constricted   Thought Process:  poverty of thoughts   Thought Content:  less paranoia noted   Perceptual Disturbances: Auditory hallucinations without commands   Risk Potential: No SI/HI   Sensorium:  person, place and time/date   Cognition:  grossly intact   Consciousness:  alert and awake    Attention: attention span appeared shorter than expected for age   Insight:  limited   Judgment: limited   Gait/Station: normal gait/station   Motor Activity: no abnormal movements     Progress Toward Goals: Ongoing    Recommended Treatment: Continue with group therapy, milieu therapy and occupational therapy  Risks, benefits and possible side effects of Medications:   Risks, benefits, and possible side effects of medications explained to patient and patient verbalizes understanding        Medications:   all current active meds have been reviewed, continue current psychiatric medications, current meds:   Current Facility-Administered Medications   Medication Dose Route Frequency    acetaminophen (TYLENOL) tablet 650 mg  650 mg Oral Q4H PRN    aluminum-magnesium hydroxide-simethicone (MYLANTA) 200-200-20 mg/5 mL oral suspension 30 mL  30 mL Oral Q4H PRN    benztropine (COGENTIN) tablet 1 mg  1 mg Oral BID    chlorproMAZINE (THORAZINE) injection SOLN 25 mg  25 mg Intramuscular Q6H PRN    chlorproMAZINE (THORAZINE) tablet 25 mg  25 mg Oral Q6H PRN    diphenhydrAMINE (BENADRYL) injection 50 mg  50 mg Intramuscular Q4H PRN    diphenhydrAMINE (BENADRYL) tablet 25 mg  25 mg Oral BID    diphenhydrAMINE (BENADRYL) tablet 50 mg  50 mg Oral Q4H PRN    levothyroxine tablet 50 mcg  50 mcg Oral Early Morning    magnesium hydroxide (MILK OF MAGNESIA) 400 mg/5 mL oral suspension 30 mL  30 mL Oral Daily PRN    nicotine (NICODERM CQ) 21 mg/24 hr TD 24 hr patch 21 mg  21 mg Transdermal Daily    OXcarbazepine (TRILEPTAL) tablet 300 mg  300 mg Oral Q12H AISLINN    perphenazine tablet 16 mg  16 mg Oral QAM    perphenazine tablet 20 mg  20 mg Oral After Dinner    traZODone (DESYREL) tablet 50 mg  50 mg Oral HS PRN      Will monitor with trial of Trileptal     Labs: Reviewed    Counseling / Coordination of Care  Total floor / unit time spent today 25 minutes  Greater than 50% of total time was spent with the patient and / or family counseling and / or coordination of care   A description of the counseling / coordination of care: 25

## 2018-07-25 NOTE — PROGRESS NOTES
Pt present in the dining room for meals but spent most of the day in his room despite encouragement to participate in groups  Pt reports A/H continue but he is mostly able to ignore them  Pt affect and mood are congruent with good eye contact  Pt has been compliant with meals and medications but reports he is getting anxious to leave  Q15 minute safety checks will be maintained

## 2018-07-25 NOTE — PLAN OF CARE
Treatment Goal: Gain control of psychotic behaviors/thinking, reduce/eliminate presenting symptoms and demonstrate improved reality functioning upon discharge Progressing      Refrain from acting on delusional thinking/internal stimuli Progressing      Recognize dysfunctional thoughts, communicate reality-based thoughts at the time of discharge Progressing      Anxiety is at manageable level Progressing      Participates in unit activities Progressing      Nutrient/Hydration intake appropriate for improving, restoring or maintaining nutritional needs Progressing      Will report no hallucinations or delusions Progressing

## 2018-07-25 NOTE — PROGRESS NOTES
Pt has been visible in the community this shift and attending programming  Pt is observed among peer but is not seen socializing  On approach, pt offers good eye contact  Kempt appearance  Flat affect  Pt appears internally preoccupied  Endorses active auditory hallucinations-denies any recent changes in such  Pt states, "I think I am getting better at tuning them out"  Pt denies SI/HI  Denies anxiety/depression  Denies agitation  Pt is not able to identify specific plans for d/c but states he is excited for such  Insight remains poor  Pt was compliant with HS medication including newly prescribed trileptal  Pt denies acute questions/concerns  Pt is able to make needs known and agrees to report changes in condition/needs to staff  Nursing will continue to monitor and assess for changes and safety with 15 minute checks

## 2018-07-26 PROCEDURE — 99231 SBSQ HOSP IP/OBS SF/LOW 25: CPT | Performed by: PSYCHIATRY & NEUROLOGY

## 2018-07-26 RX ORDER — PERPHENAZINE 4 MG/1
4 TABLET, FILM COATED ORAL
Status: DISCONTINUED | OUTPATIENT
Start: 2018-07-26 | End: 2018-07-27

## 2018-07-26 RX ORDER — PERPHENAZINE 16 MG/1
16 TABLET, FILM COATED ORAL
Status: DISCONTINUED | OUTPATIENT
Start: 2018-07-26 | End: 2018-08-08 | Stop reason: HOSPADM

## 2018-07-26 RX ADMIN — PERPHENAZINE 16 MG: 16 TABLET, FILM COATED ORAL at 17:53

## 2018-07-26 RX ADMIN — PERPHENAZINE 16 MG: 16 TABLET, FILM COATED ORAL at 09:10

## 2018-07-26 RX ADMIN — LEVOTHYROXINE SODIUM 50 MCG: 25 TABLET ORAL at 06:02

## 2018-07-26 RX ADMIN — DIPHENHYDRAMINE HYDROCHLORIDE 25 MG: 25 TABLET ORAL at 17:53

## 2018-07-26 RX ADMIN — BENZTROPINE MESYLATE 1 MG: 1 TABLET ORAL at 09:10

## 2018-07-26 RX ADMIN — DIPHENHYDRAMINE HYDROCHLORIDE 25 MG: 25 TABLET ORAL at 09:10

## 2018-07-26 RX ADMIN — PERPHENAZINE 4 MG: 4 TABLET, FILM COATED ORAL at 17:53

## 2018-07-26 RX ADMIN — TRAZODONE HYDROCHLORIDE 50 MG: 50 TABLET ORAL at 22:29

## 2018-07-26 RX ADMIN — OXCARBAZEPINE 300 MG: 150 TABLET ORAL at 21:01

## 2018-07-26 RX ADMIN — BENZTROPINE MESYLATE 1 MG: 1 TABLET ORAL at 21:01

## 2018-07-26 RX ADMIN — OXCARBAZEPINE 300 MG: 150 TABLET ORAL at 09:10

## 2018-07-26 NOTE — PROGRESS NOTES
Patient withdrawn to room without issue  Maintained on Q 15 minute safety checks  No acting out, suicidal ideations, and/or homicidal behaviors  No changes in medical condition or complaints voiced  Fluids maintained at bedside to promote hydration

## 2018-07-26 NOTE — PROGRESS NOTES
Progress Note - [address] [facility] 33 y o  male MRN: [id number]   Unit/Bed#: U 220-02 Encounter: [id number]    Behavior over the last 24 hours: Gabino Stallworth was calm, pleasant, and cooperative during interview  Per nursing staff, he has been compliant with medications and meals and was able to sleep through the night  He still complains of hearing voices which are non-threatening and non-commanding  He rates his current mood as "good" and states he is eating 100% of his meals  He offers no complaints at this time and appears in no apparent distress  He is tolerating the change from Lamictal to Trileptal without side effects  ROS: no complaints    Mental Status Evaluation:    Appearance:  age appropriate, casually dressed   Behavior:  normal, pleasant, cooperative   Speech:  normal rate and volume   Mood:  euthymic   Affect:  blunted   Thought Process:  organized, logical, coherent, goal directed   Associations: intact associations   Thought Content:  normal   Perceptual Disturbances: auditory hallucinations of non-threatening nature which are baseline   Risk Potential: Suicidal ideation - None  Homicidal ideation - None  Potential for aggression - No   Sensorium:  oriented to person, place, time/date and situation   Memory:  recent and remote memory grossly intact   Consciousness:  alert and awake   Attention: attention span and concentration are age appropriate   Insight:  fair   Judgment: fair   Gait/Station: normal gait/station   Motor Activity: no abnormal movements     Vital signs in last 24 hours:    Temp:  [96 9 °F (36 1 °C)-97 2 °F (36 2 °C)] 96 9 °F (36 1 °C)  HR:  [81-91] 81  Resp:  [16] 16  BP: (110-126)/(59-74) 110/74    Laboratory results:  I have personally reviewed all pertinent laboratory/tests results      Progress Toward Goals: continues to improve    Assessment/Plan   Principal Problem:    Schizoaffective disorder, bipolar type (HCC)  Active Problems:    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)    Unspecified mood (affective) disorder (HCC)    Amphetamine abuse    Recommended Treatment:   1  Will continue current medications as prescribed and increase Trileptal at next evaluation if patient continues to tolerate  2  Will continue to encourage patient to remain interactive with peers and attend groups  3  Discharge disposition and planning ongoing  All current active medications have been reviewed  Current Facility-Administered Medications:  acetaminophen 650 mg Oral Q4H PRN Provider Cutover   aluminum-magnesium hydroxide-simethicone 30 mL Oral Q4H PRN Provider Cutover   benztropine 1 mg Oral BID Provider Cutover   chlorproMAZINE 25 mg Intramuscular Q6H PRN Provider Cutover   chlorproMAZINE 25 mg Oral Q6H PRN Provider Cutover   diphenhydrAMINE 50 mg Intramuscular Q4H PRN Provider Cutover   diphenhydrAMINE 25 mg Oral BID BRIDGET Kuhn   diphenhydrAMINE 50 mg Oral Q4H PRN Provider Cutover   levothyroxine 50 mcg Oral Early Morning Provider Cutover   magnesium hydroxide 30 mL Oral Daily PRN Provider Cutover   nicotine 21 mg Transdermal Daily Provider Cutover   OXcarbazepine 300 mg Oral Q12H [facility] Mireya Hdez PA-C   perphenazine 16 mg Oral QAM Linnette Beckett MD   perphenazine 16 mg Oral After Celine Reyna MD   perphenazine 4 mg Oral After Celine Reyna MD   traZODone 50 mg Oral HS PRN Provider Cutover       Risks / Benefits of Treatment:    Risks, benefits, and possible side effects of medications explained to patient and patient verbalizes understanding and agreement for treatment  Counseling / Coordination of Care:      Patient's progress discussed with staff in treatment team meeting  Medications, treatment progress and treatment plan reviewed with patient

## 2018-07-26 NOTE — PROGRESS NOTES
Pt has been calm and cooperative at this time  Pt denies current si/hi at this time  Pt has been experiencing the voices ,but the pt is aware that the voices are just voices and are nothing more  Pt has been attending to his adls, has been medication and meal compliant

## 2018-07-27 PROCEDURE — 99232 SBSQ HOSP IP/OBS MODERATE 35: CPT | Performed by: PSYCHIATRY & NEUROLOGY

## 2018-07-27 RX ORDER — OXCARBAZEPINE 150 MG/1
450 TABLET, FILM COATED ORAL EVERY 12 HOURS SCHEDULED
Status: DISCONTINUED | OUTPATIENT
Start: 2018-07-27 | End: 2018-08-08 | Stop reason: HOSPADM

## 2018-07-27 RX ADMIN — PERPHENAZINE 16 MG: 16 TABLET, FILM COATED ORAL at 18:02

## 2018-07-27 RX ADMIN — OXCARBAZEPINE 450 MG: 150 TABLET ORAL at 16:06

## 2018-07-27 RX ADMIN — DIPHENHYDRAMINE HYDROCHLORIDE 25 MG: 25 TABLET ORAL at 08:37

## 2018-07-27 RX ADMIN — PERPHENAZINE 16 MG: 16 TABLET, FILM COATED ORAL at 08:36

## 2018-07-27 RX ADMIN — BENZTROPINE MESYLATE 1 MG: 1 TABLET ORAL at 20:57

## 2018-07-27 RX ADMIN — DIPHENHYDRAMINE HYDROCHLORIDE 25 MG: 25 TABLET ORAL at 18:02

## 2018-07-27 RX ADMIN — OXCARBAZEPINE 300 MG: 150 TABLET ORAL at 08:36

## 2018-07-27 RX ADMIN — BENZTROPINE MESYLATE 1 MG: 1 TABLET ORAL at 08:36

## 2018-07-27 NOTE — PROGRESS NOTES
Pt has been withdrawn to his room during the day  Pt denies current si/hi at this time  Pt has been letting his needs be known  Pt did report that he feel that his synthroid is causing him to gain weight  [t was educated on the medications and what it is for  Pt did accept the information   Pt does report, " I still have the voices ,but I am getting better at ignoring them "

## 2018-07-27 NOTE — PROGRESS NOTES
With the use of PRN trazodone, slept soundly through the night  No periods of waking have been observed  No acute concerns have been noted  15 minute checks have been maintained incident free   Will continue to monitor

## 2018-07-27 NOTE — PROGRESS NOTES
Progress Note - [address] [facility] 33 y o  male MRN: [id number]   Unit/Bed#: U 220-02 Encounter: [id number]    Behavior over the last 24 hours:  Concetta Teresa was calm, pleasant, and cooperative during interview  Per nursing staff, he is eating 100% of his meals and is taking his medication as prescribed  He denies any side effects from medications  He is interacting appropriately with staff and peers although he chooses to stay in his room most of the time  He is still hearing voices but they are non-threatening  He reports he is able to deal with them without distress  ROS: no complaints    Mental Status Evaluation:    Appearance:  age appropriate, casually dressed   Behavior:  normal   Speech:  normal rate and volume   Mood:  normal   Affect:  appropriate   Thought Process:  organized, logical, coherent   Associations: intact associations   Thought Content:  normal   Perceptual Disturbances: auditory hallucinations   Risk Potential: Suicidal ideation - None  Homicidal ideation - None  Potential for aggression - No   Sensorium:  oriented to person, place, time/date and situation   Memory:  recent and remote memory grossly intact   Consciousness:  alert and awake   Attention: attention span and concentration are age appropriate   Insight:  fair   Judgment: fair   Gait/Station: normal gait/station and normal balance   Motor Activity: no abnormal movements     Vital signs in last 24 hours:    Temp:  [96 1 °F (35 6 °C)-96 7 °F (35 9 °C)] 96 7 °F (35 9 °C)  HR:  [102-108] 108  Resp:  [16] 16  BP: (124-126)/(67-81) 126/81    Laboratory results:  I have personally reviewed all pertinent laboratory/tests results      Progress Toward Goals: continues to improve    Assessment/Plan   Principal Problem:    Schizoaffective disorder, bipolar type (HCC)  Active Problems:    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)    Unspecified mood (affective) disorder (HCC)    Amphetamine abuse    Recommended Treatment:   1  Will decrease Perphenazine to 16mg BID in preparation for discharge  2  Will increase Trileptal to 450mg BID to control mood  3  Discharge disposition and planning ongoing  All current active medications have been reviewed  Current Facility-Administered Medications:  acetaminophen 650 mg Oral Q4H PRN Provider Cutover   aluminum-magnesium hydroxide-simethicone 30 mL Oral Q4H PRN Provider Cutover   benztropine 1 mg Oral BID Provider Cutover   chlorproMAZINE 25 mg Intramuscular Q6H PRN Provider Cutover   chlorproMAZINE 25 mg Oral Q6H PRN Provider Cutover   diphenhydrAMINE 50 mg Intramuscular Q4H PRN Provider Cutover   diphenhydrAMINE 25 mg Oral BID BRIDGET Kuhn   diphenhydrAMINE 50 mg Oral Q4H PRN Provider Cutover   levothyroxine 50 mcg Oral Early Morning Provider Cutover   magnesium hydroxide 30 mL Oral Daily PRN Provider Cutover   nicotine 21 mg Transdermal Daily Provider Cutover   OXcarbazepine 450 mg Oral Q12H [address] BRIDGET Kuhn   perphenazine 16 mg Oral QAM Ted Mckeon MD   perphenazine 16 mg Oral After Denisha Schwab MD   traZODone 50 mg Oral HS PRN Provider Cutover       Risks / Benefits of Treatment:    Risks, benefits, and possible side effects of medications explained to patient and patient verbalizes understanding and agreement for treatment  Counseling / Coordination of Care:      Patient's progress discussed with staff in treatment team meeting  Medications, treatment progress and treatment plan reviewed with patient

## 2018-07-27 NOTE — SOCIAL WORK
SW met with patient to review the Treatment Plan  Patient was observed in his room lying in bed  Readily responsive to approach  Vague and ambivalent re: future plans; did state he was planning on staying with his sister-in-law--"I think " Reported he would try to work "I guess " Continues to experience AH that "are messed up " Stated the voices are not command in nature, but rather 'pick' on him  Reported that the meds have allowed him to better ignore them but have not taken them away  Stated that he doesn't participate in groups because the voices are too distracting  Also states that 'King dheeraj' is "still around somewhere  Believes the psychotic sx are a result of the long-standing methamphetamine use as they had appeared 5 months ago for the first time  Does not feel a D&A rehab is required as he feels he has been detoxed and off meds enough time to deter his use after d/c  Treatment plan was reviewed and signed by the patient

## 2018-07-27 NOTE — PROGRESS NOTES
Pt has been withdrawn to room this shift and resting in bed  Did attend programming  Has not been social with peers  On approach, pt present unclean and unkempt  Eye contact fleeting  Pt continues to report active AH but states they are unchanged and non-concerning  Pt continues to report tolerating them well and dealing with occasional agitation by exercising  Pt denies SI/HI  Remains med compliant  Reports anticipation of upcoming d/c and denies concerns with such  Pt did attempt to sleep without PRN but later returned requesting PRN trazodone for sleep-was effective within an hour  Pt is able to make needs known and agrees to report changes in condition/needs to staff  Nursing will continue to monitor and assess for changes and safety with 15 minute checks

## 2018-07-27 NOTE — SOCIAL WORK
CASTRO dumont from [facility], emergency contact for the patient  Caller wanted to confirm patient d/c, reporting  the patient had contacted another person stating that he was d/c today and needed to be picked up at 2 PM  Caller was informed that this information was incorrect  The patient has no established d/c date at this time; medication adjustment and treatment will continue  Caller will be notified when patient's d/c has been determined

## 2018-07-28 PROCEDURE — 99231 SBSQ HOSP IP/OBS SF/LOW 25: CPT | Performed by: PSYCHIATRY & NEUROLOGY

## 2018-07-28 RX ADMIN — DIPHENHYDRAMINE HYDROCHLORIDE 25 MG: 25 TABLET ORAL at 17:25

## 2018-07-28 RX ADMIN — OXCARBAZEPINE 450 MG: 150 TABLET ORAL at 17:25

## 2018-07-28 RX ADMIN — OXCARBAZEPINE 450 MG: 150 TABLET ORAL at 08:37

## 2018-07-28 RX ADMIN — BENZTROPINE MESYLATE 1 MG: 1 TABLET ORAL at 08:37

## 2018-07-28 RX ADMIN — DIPHENHYDRAMINE HYDROCHLORIDE 25 MG: 25 TABLET ORAL at 08:37

## 2018-07-28 RX ADMIN — PERPHENAZINE 16 MG: 16 TABLET, FILM COATED ORAL at 17:25

## 2018-07-28 RX ADMIN — BENZTROPINE MESYLATE 1 MG: 1 TABLET ORAL at 20:45

## 2018-07-28 RX ADMIN — PERPHENAZINE 16 MG: 16 TABLET, FILM COATED ORAL at 08:37

## 2018-07-28 NOTE — PROGRESS NOTES
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)    Unspecified mood (affective) disorder (HCC)    Amphetamine abuse    This is a 15 minutes psychiatric progress note on patient  10 minutes were spent in coordination and care of treatment this patient  Coordination care consisted me with the nursing staff discussed patient's progress, documentation, medication, orders and behaviors over last 24 hours  Per the nursing staff on the patient's eating 100% of his meals supple through the night  On examination today, the patient is still reporting auditory hallucinations  These now appear to be chronic  Fortunately, he has had no episodes of aggression, agitation or desire to leave the unit  He denies depressive symptoms  He is not suicidal   Monitor recent increase of Trileptal   Behavior over the last 24 hours:  improved  Sleep: normal  Appetite: normal  Medication side effects: No  ROS: no complaints    Mental Status Evaluation:  Appearance:  casually dressed and disheveled   Behavior:  cooperative   Speech:  soft   Mood:  constricted   Affect:  constricted   Thought Process:  blocked   Thought Content:  delusions  obsessive/rumination   Perceptual Disturbances: Auditory hallucinations without commands       Sensorium:  person, place and time/date   Cognition:  impaired due to illness   Consciousness:  alert and awake    Attention: attention span appeared shorter than expected for age   Insight:  limited   Judgment: limited   Gait/Station: normal gait/station   Motor Activity: no abnormal movements     Progress Toward Goals: minimal improvement    Recommended Treatment: Continue with group therapy, milieu therapy and occupational therapy        Risks, benefits and possible side effects of Medications:   Risks, benefits, and possible side effects of medications explained to patient and patient verbalizes understanding  Medications: continue current psychiatric medications  Labs: Reviewed    Counseling / Coordination of Care  Total floor / unit time spent today 15 minutes  Greater than 50% of total time was spent with the patient and / or family counseling and / or coordination of care   A description of the counseling / coordination of care: 15

## 2018-07-28 NOTE — PROGRESS NOTES
Patient bright alert, awake, ate breakfast with peers, back to bed after eating  Patient remains mostly withdrawn to self  Pt maintains good eye contact, fair hygiene, compliant with medications and care  Pt  Denies si hi and c/o non command auditory hallucinations  Pt states " was supposed to leave yesterday but they keep messing with that" discussed medication changes and possible upcoming discharge this week  Will maintain on safety precautions and 15 minute checks  No needs identified

## 2018-07-28 NOTE — PROGRESS NOTES
Pt has been withdrawn to his room this shift and declining attendance to evening programming  On approach, pt promptly presents for assessment  Appears unkempt  Eye contact fleeting  Pt continues to report active AH but states they are unchanged  Continues to utilize rest and isolation as means of coping  Denies acute concerns  Pt is unable to offer much detail in d/c plans with exception of going to live with is SHRUTI  Pt denies SI/HI and contracts for safety on the unit  With recently changes trileptal dose, pt only receives cogentin at bedtime of which he was compliant  Pt is able to make needs known and agrees to report changes in condition/needs to staff  Nursing will continue to monitor and assess for changes and safety with 15 minute checks

## 2018-07-28 NOTE — PLAN OF CARE
Alteration in Thoughts and Perception     Treatment Goal: Gain control of psychotic behaviors/thinking, reduce/eliminate presenting symptoms and demonstrate improved reality functioning upon discharge Not Progressing     Recognize dysfunctional thoughts, communicate reality-based thoughts at the time of discharge Not [address] Discharge to post-acute care or home with appropriate resources Not Progressing        PSYCHOSIS     Will report no hallucinations or delusions Not Progressing          Alteration in Thoughts and Perception     Refrain from acting on delusional thinking/internal stimuli Progressing        Anxiety     Anxiety is at manageable level Progressing        Ineffective Coping     Participates in unit activities Progressing        Nutrition/Hydration-ADULT     Nutrient/Hydration intake appropriate for improving, restoring or maintaining nutritional needs Progressing

## 2018-07-29 PROCEDURE — 99231 SBSQ HOSP IP/OBS SF/LOW 25: CPT | Performed by: PSYCHIATRY & NEUROLOGY

## 2018-07-29 RX ADMIN — DIPHENHYDRAMINE HYDROCHLORIDE 25 MG: 25 TABLET ORAL at 17:50

## 2018-07-29 RX ADMIN — TRAZODONE HYDROCHLORIDE 50 MG: 50 TABLET ORAL at 23:08

## 2018-07-29 RX ADMIN — PERPHENAZINE 16 MG: 16 TABLET, FILM COATED ORAL at 08:26

## 2018-07-29 RX ADMIN — LEVOTHYROXINE SODIUM 50 MCG: 25 TABLET ORAL at 06:26

## 2018-07-29 RX ADMIN — DIPHENHYDRAMINE HYDROCHLORIDE 25 MG: 25 TABLET ORAL at 08:26

## 2018-07-29 RX ADMIN — BENZTROPINE MESYLATE 1 MG: 1 TABLET ORAL at 08:26

## 2018-07-29 RX ADMIN — OXCARBAZEPINE 450 MG: 150 TABLET ORAL at 17:50

## 2018-07-29 RX ADMIN — PERPHENAZINE 16 MG: 16 TABLET, FILM COATED ORAL at 17:50

## 2018-07-29 RX ADMIN — OXCARBAZEPINE 450 MG: 150 TABLET ORAL at 08:26

## 2018-07-29 RX ADMIN — BENZTROPINE MESYLATE 1 MG: 1 TABLET ORAL at 21:11

## 2018-07-29 NOTE — PROGRESS NOTES
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)    Unspecified mood (affective) disorder (HCC)    Amphetamine abuse   This is a 15 minutes psychiatric progress note on patient  10 minutes were spent in coordination and care of treatment of this patient  Coordination care consisted of meeting with the nursing staff discussed patient's progress, documentation, medication, orders and behaviors over last 24 hours  Per the nursing staff on the patient is eating 100% of his meals he supple through the night  There is no new clinical complaints or concerns on this patient last 24 hours  He continues to report auditory hallucinations  They have not changed in intensity over the last 72 hours  There been no episodes of agitation or aggression  At this point, his mood is stable  Behavior over the last 24 hours:  improved  Sleep: normal  Appetite: normal  Medication side effects: No  ROS: no complaints    Mental Status Evaluation:  Appearance:  casually dressed   Behavior:  normal   Speech:  soft   Mood:  constricted   Affect:  constricted   Thought Process:  circumstantial   Thought Content:  delusions  obsessive/rumination   Perceptual Disturbances: Auditory hallucinations without commands   Risk Potential: none   Sensorium:  person, place, time/date and situation   Cognition:  grossly intact   Consciousness:  alert and awake    Attention: attention span and concentration were age appropriate   Insight:  limited   Judgment: limited   Gait/Station: normal gait/station   Motor Activity: no abnormal movements     Progress Toward Goals: Currently Stable    Recommended Treatment: Continue with group therapy, milieu therapy and occupational therapy  Risks, benefits and possible side effects of Medications:   Risks, benefits, and possible side effects of medications explained to patient and patient verbalizes understanding  Medications: continue current psychiatric medications  Labs: Reviewed    Counseling / Coordination of Care  Total floor / unit time spent today 15 minutes  Greater than 50% of total time was spent with the patient and / or family counseling and / or coordination of care   A description of the counseling / coordination of care: 15

## 2018-07-29 NOTE — PROGRESS NOTES
Pt has been minimally visible on the unit this shift  Has attended partial programming but quickly retreats to room d/t reports of annoying AH  Pt continues to presents unkempt  Fleeting eye contact  Pt states the voices are still unchanged in nature  Pt continues to deny acute concerns with such and states he is handling them just fine  Denied need for PRN medication at this time  Pt denies SI/HI/stein  Was compliant with scheduled HS marc  Pt is able to make needs known and agrees to report changes in condition/needs to staff  Nursing will continue to monitor and assess for changes and safety with 15 minute checks

## 2018-07-29 NOTE — PLAN OF CARE
Alteration in Thoughts and Perception     Treatment Goal: Gain control of psychotic behaviors/thinking, reduce/eliminate presenting symptoms and demonstrate improved reality functioning upon discharge Not Progressing     Recognize dysfunctional thoughts, communicate reality-based thoughts at the time of discharge Not Progressing        Anxiety     Anxiety is at manageable level Not Progressing        DISCHARGE PLANNING - CARE MANAGEMENT     Discharge to post-acute care or home with appropriate resources Not Progressing        Ineffective Coping     Participates in unit activities Not Progressing        Nutrition/Hydration-ADULT     Nutrient/Hydration intake appropriate for improving, restoring or maintaining nutritional needs Not Progressing        PSYCHOSIS     Will report no hallucinations or delusions Not Progressing          Alteration in Thoughts and Perception     Refrain from acting on delusional thinking/internal stimuli Progressing

## 2018-07-30 PROCEDURE — 99232 SBSQ HOSP IP/OBS MODERATE 35: CPT | Performed by: PSYCHIATRY & NEUROLOGY

## 2018-07-30 RX ORDER — PERPHENAZINE 4 MG/1
4 TABLET, FILM COATED ORAL EVERY 24 HOURS
Status: DISCONTINUED | OUTPATIENT
Start: 2018-07-30 | End: 2018-07-31

## 2018-07-30 RX ADMIN — OXCARBAZEPINE 450 MG: 150 TABLET ORAL at 08:24

## 2018-07-30 RX ADMIN — PERPHENAZINE 16 MG: 16 TABLET, FILM COATED ORAL at 17:02

## 2018-07-30 RX ADMIN — OXCARBAZEPINE 450 MG: 150 TABLET ORAL at 17:02

## 2018-07-30 RX ADMIN — LEVOTHYROXINE SODIUM 50 MCG: 25 TABLET ORAL at 06:14

## 2018-07-30 RX ADMIN — PERPHENAZINE 4 MG: 4 TABLET, FILM COATED ORAL at 13:06

## 2018-07-30 RX ADMIN — DIPHENHYDRAMINE HYDROCHLORIDE 25 MG: 25 TABLET ORAL at 08:24

## 2018-07-30 RX ADMIN — PERPHENAZINE 16 MG: 16 TABLET, FILM COATED ORAL at 08:24

## 2018-07-30 RX ADMIN — BENZTROPINE MESYLATE 1 MG: 1 TABLET ORAL at 08:24

## 2018-07-30 RX ADMIN — DIPHENHYDRAMINE HYDROCHLORIDE 25 MG: 25 TABLET ORAL at 17:01

## 2018-07-30 RX ADMIN — BENZTROPINE MESYLATE 1 MG: 1 TABLET ORAL at 20:38

## 2018-07-30 NOTE — PROGRESS NOTES
Patient awake in evening for meals and medications only, then quickly back to bed  Pt remains internally preoccupied and withdrawn to self  Pt denies si hi and hallucinations  Will maintain on safety precautions and 15 minute checks, no needs identified

## 2018-07-30 NOTE — PLAN OF CARE

## 2018-07-30 NOTE — PROGRESS NOTES
Pt has been WD to room  Present for meals and meds  Pt is quiet but will engage in brief conversation when approached  Pt states that voices are present but "no really bothering" him today  No acute issues or concerns  Monitoring continues

## 2018-07-30 NOTE — SOCIAL WORK
SW met with pt who was laying in bed  Pt presents as calm and content  Pt denies SI/HI/VH  Pt states that [address] continues but he has learned to cope with them better  Pt states that he feels safe to discharge  Pt informed SW that she should speak to Kitty Peacock who is live a sister-in-law as that is where he plans to go

## 2018-07-30 NOTE — PROGRESS NOTES
Progress Note - [address] [facility] 33 y o  male MRN: [id number]   Unit/Bed#: U 220-02 Encounter: [id number]    Behavior over the last 24 hours:  Eugenia Vick was cooperative for interview  He was seen in his room where he isolates daily  Per nursing staff, he has been taking his medications as prescribed and has been sleeping through the night  His appetite is good  He still reports hearing voices but they are non-commanding  He hears them all of the time  He rates his current mood as "good" and is not having any side effects from his medications  He wishes to increase medication in an attempt to decrease/eliminate the auditory hallucinations if possible  ROS: no complaints    Mental Status Evaluation:    Appearance:  casually dressed, dressed appropriately   Behavior:  normal, cooperative, calm   Speech:  normal rate and volume   Mood:  normal   Affect:  flat   Thought Process:  organized, logical, coherent   Associations: intact associations   Thought Content:  normal   Perceptual Disturbances: auditory hallucinations   Risk Potential: Suicidal ideation - None  Homicidal ideation - None  Potential for aggression - No   Sensorium:  oriented to person, place, time/date and situation   Memory:  recent and remote memory grossly intact   Consciousness:  alert and awake   Attention: attention span and concentration are age appropriate   Insight:  fair   Judgment: fair   Gait/Station: normal gait/station   Motor Activity: no abnormal movements     Vital signs in last 24 hours:    Temp:  [97 5 °F (36 4 °C)-97 8 °F (36 6 °C)] 97 5 °F (36 4 °C)  HR:  [] 94  Resp:  [16-18] 16  BP: (111-115)/(57-66) 115/66    Laboratory results:  I have personally reviewed all pertinent laboratory/tests results      Progress Toward Goals: improved    Assessment/Plan   Principal Problem:    Schizoaffective disorder, bipolar type (HCC)  Active Problems:    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)    Unspecified mood (affective) disorder (HCC)    Amphetamine abuse    Recommended Treatment:   1  Will obtain ECG to determine QTC interval   2  Will increase Perphenazine to a total of 36mg daily by adding 4mg PO in the afternoon in an effort to decrease/alleviate auditory hallucinations  3  Will continue to monitor patient and adjust medications as needed  4  Discharge disposition and planning ongoing  All current active medications have been reviewed  Current Facility-Administered Medications:  acetaminophen 650 mg Oral Q4H PRN Provider Cutover   aluminum-magnesium hydroxide-simethicone 30 mL Oral Q4H PRN Provider Cutover   benztropine 1 mg Oral BID Provider Cutover   chlorproMAZINE 25 mg Intramuscular Q6H PRN Provider Cutover   chlorproMAZINE 25 mg Oral Q6H PRN Provider Cutover   diphenhydrAMINE 50 mg Intramuscular Q4H PRN Provider Cutover   diphenhydrAMINE 25 mg Oral BID BRIDGET Kuhn   diphenhydrAMINE 50 mg Oral Q4H PRN Provider Cutover   levothyroxine 50 mcg Oral Early Morning Provider Cutover   magnesium hydroxide 30 mL Oral Daily PRN Provider Cutover   nicotine 21 mg Transdermal Daily Provider Cutover   OXcarbazepine 450 mg Oral Q12H [facility] BRIDGET Kuhn   perphenazine 16 mg Oral QAM Pushpa Roach MD   perphenazine 16 mg Oral After Dayana Galindo MD   perphenazine 4 mg Oral Q24H BRIDGET Kuhn   traZODone 50 mg Oral HS PRN Provider Cutover       Risks / Benefits of Treatment:    Risks, benefits, and possible side effects of medications explained to patient and patient verbalizes understanding and agreement for treatment  Counseling / Coordination of Care:      Patient's progress discussed with staff in treatment team meeting  Medications, treatment progress and treatment plan reviewed with patient  Patient understands that auditory hallucinations may persist permanently despite medication adjustment

## 2018-07-30 NOTE — PROGRESS NOTES
Following the request/receipt of PRN trazodone at the beginning of the shift, pt is and has been sleeping soundly through the night  No periods of waking have been observed  No acute concerns have been noted  15 minute checks have been maintained incident free   Will continue to monitor

## 2018-07-30 NOTE — PLAN OF CARE
Alteration in Thoughts and Perception     Treatment Goal: Gain control of psychotic behaviors/thinking, reduce/eliminate presenting symptoms and demonstrate improved reality functioning upon discharge Progressing     Refrain from acting on delusional thinking/internal stimuli Progressing     Recognize dysfunctional thoughts, communicate reality-based thoughts at the time of discharge Progressing        Anxiety     [facility] to post-acute care or home with appropriate resources Progressing        Ineffective Coping     Participates in unit activities Progressing        Nutrition/Hydration-ADULT     Nutrient/Hydration intake appropriate for improving, restoring or maintaining nutritional needs Progressing        PSYCHOSIS     Will report no hallucinations or delusions Progressing

## 2018-07-31 PROCEDURE — 99232 SBSQ HOSP IP/OBS MODERATE 35: CPT | Performed by: PSYCHIATRY & NEUROLOGY

## 2018-07-31 RX ORDER — PERPHENAZINE 4 MG/1
8 TABLET, FILM COATED ORAL DAILY
Status: DISCONTINUED | OUTPATIENT
Start: 2018-07-31 | End: 2018-08-01

## 2018-07-31 RX ADMIN — PERPHENAZINE 16 MG: 16 TABLET, FILM COATED ORAL at 18:02

## 2018-07-31 RX ADMIN — OXCARBAZEPINE 450 MG: 150 TABLET ORAL at 18:01

## 2018-07-31 RX ADMIN — BENZTROPINE MESYLATE 1 MG: 1 TABLET ORAL at 21:48

## 2018-07-31 RX ADMIN — PERPHENAZINE 8 MG: 4 TABLET, FILM COATED ORAL at 12:19

## 2018-07-31 RX ADMIN — LEVOTHYROXINE SODIUM 50 MCG: 25 TABLET ORAL at 06:18

## 2018-07-31 RX ADMIN — OXCARBAZEPINE 450 MG: 150 TABLET ORAL at 08:47

## 2018-07-31 RX ADMIN — PERPHENAZINE 16 MG: 16 TABLET, FILM COATED ORAL at 08:48

## 2018-07-31 RX ADMIN — DIPHENHYDRAMINE HYDROCHLORIDE 25 MG: 25 TABLET ORAL at 08:47

## 2018-07-31 RX ADMIN — DIPHENHYDRAMINE HYDROCHLORIDE 25 MG: 25 TABLET ORAL at 18:02

## 2018-07-31 RX ADMIN — BENZTROPINE MESYLATE 1 MG: 1 TABLET ORAL at 08:47

## 2018-07-31 NOTE — PROGRESS NOTES
Patient bright, alert, awake, ate all meals with peers, then mostly withdrawn to self in room and sleeping often  Pt continues to c/o auditory hallucinations without command and states they are not bothering him today  Pt denies  si hi and has no thoughts of elopement  Will maintain on safety precautions and 15 minute checks  No needs identified

## 2018-07-31 NOTE — PROGRESS NOTES
Progress Note - 4[address] [facility] 33 y o  male MRN: [id number]   Unit/Bed#: U 220-02 Encounter: [id number]    Behavior over the last 24 hours:  Logan Mercado was calm and cooperative during interview  Per nursing staff, he has been taking his medications as prescribed  He has been eating 100% of his meals, and is sleeping through the night without difficulty  He remains isolative to his room and has minimal interaction with staff and peers  He denies any side effects to medications, but feels they are not working to alleviate the voices in his head  Logan Mercado understands that these voices may never go away, but wishes to increase his medication  He offers no other complaints at this time  ROS: no complaints    Mental Status Evaluation:    Appearance:  casually dressed   Behavior:  normal, pleasant, cooperative   Speech:  normal rate and volume   Mood:  dysphoric   Affect:  flat   Thought Process:  organized, logical, coherent, goal directed   Associations: intact associations   Thought Content:  normal   Perceptual Disturbances:  Non command auditory hallucinations   Risk Potential: Suicidal ideation - None  Homicidal ideation - None  Potential for aggression - No   Sensorium:  oriented to person, place, time/date and situation   Memory:  recent and remote memory grossly intact   Consciousness:  alert and awake   Attention: decreased concentration and decreased attention span   Insight:  fair   Judgment: fair   Gait/Station: normal gait/station   Motor Activity: no abnormal movements     Vital signs in last 24 hours:    Temp:  [96 5 °F (35 8 °C)-97 8 °F (36 6 °C)] 96 5 °F (35 8 °C)  HR:  [78-96] 96  Resp:  [16] 16  BP: (118-134)/(58-88) 134/58    Laboratory results:  I have personally reviewed all pertinent laboratory/tests results  Progress Toward Goals:  Still experiencing auditory hallucinations but is otherwise stable      Assessment/Plan   Principal Problem:    Schizoaffective disorder, bipolar type ([facility] )  Active Problems:    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 ([facility] )    Unspecified mood (affective) disorder (HCC)    Amphetamine abuse    Recommended Treatment:   1  Will increase perphenazine to a dose of 40 mg total daily in divided doses  2   Will continue to monitor patient and adjust medications as necessary  3   Case management and  are exploring options for post-discharge support services  4   Discharge disposition and planning are ongoing  All current active medications have been reviewed  Current Facility-Administered Medications:  acetaminophen 650 mg Oral Q4H PRN Provider Cutover   aluminum-magnesium hydroxide-simethicone 30 mL Oral Q4H PRN Provider Cutover   benztropine 1 mg Oral BID Provider Cutover   chlorproMAZINE 25 mg Intramuscular Q6H PRN Provider Cutover   chlorproMAZINE 25 mg Oral Q6H PRN Provider Cutover   diphenhydrAMINE 50 mg Intramuscular Q4H PRN Provider Cutover   diphenhydrAMINE 25 mg Oral BID BRIDGET Kuhn   diphenhydrAMINE 50 mg Oral Q4H PRN Provider Cutover   levothyroxine 50 mcg Oral Early Morning Provider Cutover   magnesium hydroxide 30 mL Oral Daily PRN Provider Cutover   nicotine 21 mg Transdermal Daily Provider Cutover   OXcarbazepine 450 mg Oral Q12H [facility] BRIDGET Kuhn   perphenazine 16 mg Oral DELFINO Montejo MD   perphenazine 16 mg Oral After Lake Delacruz MD   perphenazine 8 mg Oral Daily BRIDGET Kuhn   traZODone 50 mg Oral HS PRN Provider Cutover       Risks / Benefits of Treatment:    Risks, benefits, and possible side effects of medications explained to patient and patient verbalizes understanding and agreement for treatment  Counseling / Coordination of Care:      Patient's progress discussed with staff in treatment team meeting  Medications, treatment progress and treatment plan reviewed with patient

## 2018-07-31 NOTE — PROGRESS NOTES
Patient withdrawn to room  Out for snack but on the periphery of the community  Maintained on Q 15 minute safety checks  No acting out, suicidal ideations, and/or homicidal behaviors  No changes in medical condition or complaints voiced  Fluids maintained at bedside to promote hydration

## 2018-07-31 NOTE — PLAN OF CARE
DISCHARGE PLANNING - CARE MANAGEMENT     Discharge to post-acute care or home with appropriate resources Not Progressing        Ineffective Coping     Participates in unit activities Not Progressing        PSYCHOSIS     Will report no hallucinations or delusions Not Progressing          Alteration in Thoughts and Perception     Treatment Goal: Gain control of psychotic behaviors/thinking, reduce/eliminate presenting symptoms and demonstrate improved reality functioning upon discharge Progressing     Refrain from acting on delusional thinking/internal stimuli Progressing     Recognize dysfunctional thoughts, communicate reality-based thoughts at the time of discharge Progressing        Anxiety     Anxiety is at manageable level Progressing        Nutrition/Hydration-ADULT     Nutrient/Hydration intake appropriate for improving, restoring or maintaining nutritional needs Progressing

## 2018-07-31 NOTE — PROGRESS NOTES
No agitation noted  Patient observed sleeping during most Q15 minute safety checks (second portion of shift)  No suicidal ideations, acting out or homicidal behaviors  No change in medical condition or complaints voiced  Fluids maintained at bedside to promote hydration

## 2018-08-01 LAB
ATRIAL RATE: 77 BPM
P AXIS: 52 DEGREES
PR INTERVAL: 158 MS
QRS AXIS: 83 DEGREES
QRSD INTERVAL: 106 MS
QT INTERVAL: 372 MS
QTC INTERVAL: 420 MS
T WAVE AXIS: 55 DEGREES
VENTRICULAR RATE: 77 BPM

## 2018-08-01 PROCEDURE — 99231 SBSQ HOSP IP/OBS SF/LOW 25: CPT | Performed by: PSYCHIATRY & NEUROLOGY

## 2018-08-01 RX ORDER — PERPHENAZINE 4 MG/1
12 TABLET, FILM COATED ORAL DAILY
Status: DISCONTINUED | OUTPATIENT
Start: 2018-08-02 | End: 2018-08-08 | Stop reason: HOSPADM

## 2018-08-01 RX ADMIN — LEVOTHYROXINE SODIUM 50 MCG: 25 TABLET ORAL at 06:07

## 2018-08-01 RX ADMIN — OXCARBAZEPINE 450 MG: 150 TABLET ORAL at 17:28

## 2018-08-01 RX ADMIN — DIPHENHYDRAMINE HYDROCHLORIDE 25 MG: 25 TABLET ORAL at 17:28

## 2018-08-01 RX ADMIN — OXCARBAZEPINE 450 MG: 150 TABLET ORAL at 09:03

## 2018-08-01 RX ADMIN — DIPHENHYDRAMINE HYDROCHLORIDE 25 MG: 25 TABLET ORAL at 09:03

## 2018-08-01 RX ADMIN — PERPHENAZINE 8 MG: 4 TABLET, FILM COATED ORAL at 12:09

## 2018-08-01 RX ADMIN — BENZTROPINE MESYLATE 1 MG: 1 TABLET ORAL at 09:03

## 2018-08-01 RX ADMIN — PERPHENAZINE 16 MG: 16 TABLET, FILM COATED ORAL at 09:03

## 2018-08-01 RX ADMIN — PERPHENAZINE 16 MG: 16 TABLET, FILM COATED ORAL at 17:28

## 2018-08-01 RX ADMIN — BENZTROPINE MESYLATE 1 MG: 1 TABLET ORAL at 21:13

## 2018-08-01 RX ADMIN — TRAZODONE HYDROCHLORIDE 50 MG: 50 TABLET ORAL at 21:13

## 2018-08-01 NOTE — PROGRESS NOTES
Pt has been unchanged today from previous days  Pt denies current si/hi at this time  Pt has been withdrawn to his room during the day with the occasional pacing the hallways  Pt denies current medical issues at this time

## 2018-08-01 NOTE — PROGRESS NOTES
Progress Note - 4[address] [facility] 33 y o  male MRN: @MRN   Unit/Bed#: [facility] 386-76 Encounter: [id number]    Per nursing report and sign out, patient continues to remain unchanged    Roberta Gordon reports today that he is doing ok overall  No side effects  AH still, and CAH - to pull pants down around my ankles and walk down the hallway which I will not do  No SI or HI  VH only when he closes his eyes he notes ("The crab franca or something like that") occasionally  Sleep: normal - no increase with new medications  Appetite: normal  Medication side effects: No   ROS: no complaints    Mental Status Evaluation:    Appearance:  age appropriate   Behavior:  pleasant, cooperative, with good eye contact   Speech:  Normal volume, regular rate and rhythm, paucity of speech   Mood:  dysphoric, anxious   Affect:  dysphoric, blunted       Thought Process:  Linear and goal directed   Associations: intact associations   Thought Content:  normal and appropriate   Perceptual Disturbances: auditory hallucinations   Risk Potential: Suicidal ideation - None  Homicidal ideation - None  Potential for aggression - No   Sensorium:  A&Ox3   Cognition:  grossly intact   Consciousness:  Alert & Awake   Memory:  recent and remote memory grossly intact   Attention: attention span and concentration are age appropriate   Intellect: within normal limits       Insight:  fair   Judgment: fair         Gait/Station: normal gait/station with good balance   Motor Activity: no abnormal movements     Vital signs in last 24 hours:    Temp:  [96 2 °F (35 7 °C)-97 7 °F (36 5 °C)] 97 7 °F (36 5 °C)  HR:  [] 90  Resp:  [16-18] 16  BP: (118-155)/(56-65) 155/65    Laboratory results:  I have personally reviewed all pertinent laboratory/tests results      Progress Toward Goals:  slow improvement  Assessment/Plan   Principal Problem:    Schizoaffective disorder, bipolar type (HCC)  Active Problems:    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 ([facility] )    Unspecified mood (affective) disorder ([facility] )    Amphetamine abuse      Delmus Carly is tolerating AH better, but it is not improving much  Will continue to increase medication and he agrees, but we may be limited in our ability to resolve completely  Recommended Treatment:     Planned medication and treatment changes: All current active medications have been reviewed  Encourage group therapy, milieu therapy and occupational therapy  [address] checks as unit standard unless ordered or noted otherwise      Current Facility-Administered Medications:  acetaminophen 650 mg Oral Q4H PRN Provider Cutover   aluminum-magnesium hydroxide-simethicone 30 mL Oral Q4H PRN Provider Cutover   benztropine 1 mg Oral BID Provider Cutover   chlorproMAZINE 25 mg Intramuscular Q6H PRN Provider Cutover   chlorproMAZINE 25 mg Oral Q6H PRN Provider Cutover   diphenhydrAMINE 50 mg Intramuscular Q4H PRN Provider Cutover   diphenhydrAMINE 25 mg Oral BID BRIDGET Kuhn   diphenhydrAMINE 50 mg Oral Q4H PRN Provider Cutover   levothyroxine 50 mcg Oral Early Morning Provider Cutover   magnesium hydroxide 30 mL Oral Daily PRN Provider Cutover   nicotine 21 mg Transdermal Daily Provider Cutover   OXcarbazepine 450 mg Oral Q12H [address] BRIDGET Kuhn   perphenazine 16 mg Oral QAM Aleksandr Montoya MD   perphenazine 16 mg Oral After Rory Burleson MD   perphenazine 8 mg Oral Daily BRIDGET Kuhn   traZODone 50 mg Oral HS PRN Provider Cutover       1) Increase perphenazine to 12mg mid day starting tomorrow (total of 44mg/day)  2) Continue to support patient and engage them in the programs available as feasible and appropriate  Continue case management support and therapy  Continue discharge planning      Risks / Benefits of Treatment:    Risks, benefits, and possible side effects of medications explained to patient and patient verbalizes understanding and agreement for treatment  Counseling / Coordination of Care:    Medication changes reviewed with nursing staff  Medications, treatment progress and treatment plan reviewed with patient  Medication changes discussed with patient        Magy Fagan III, DO  8/1/2018  12:13 PM

## 2018-08-01 NOTE — SOCIAL WORK
SW attempted to contact pt's support Samia regarding discharge  SW left message and requested a return phone call

## 2018-08-01 NOTE — PLAN OF CARE
Problem: DISCHARGE PLANNING - CARE MANAGEMENT  Goal: Discharge to post-acute care or home with appropriate resources  INTERVENTIONS:  - Conduct assessment to determine patient/family and health care team treatment goals, and need for post-acute services based on payer coverage, community resources, and patient preferences, and barriers to discharge  - Address psychosocial, clinical, and financial barriers to discharge as identified in assessment in conjunction with the patient/family and health care team  - Arrange appropriate level of post-acute services according to patient's   needs and preference and payer coverage in collaboration with the physician and health care team  - Communicate with and update the patient/family, physician, and health care team regarding progress on the discharge plan  - Arrange appropriate transportation to post-acute venues    Outcome: Progressing   Pt assisting SW with finding discharge environment

## 2018-08-01 NOTE — SOCIAL WORK
"CASTRO met with pt to discuss aftercare plan  SW informed pt that she has been unable to get in touch with Angelicayousif Gilliam  Pt states that he hasn't spoken to her in awhile  SW informed pt that he has no other supports; he states that he will speak to friend Luther Handler but doubts he can help  SW asked pt to reach out to Angelicayousif Gilliam and explain situation and have her call SW back  SW informed pt that the good news is that he has insurance and we can get him services; CASTRO informed pt that we recommend psychiatrist, therapist, and  pt agreed as long as it is not expensive; CASTRO informed him that insurance should cover everything  SW informed pt that we will schedule services once we know where he is going  SW also informed pt that he can offer to have a meeting with him, her, and the tx team prior to discharge if that would make her more comfortable  Pt informed SW that he will call Angelica Gilliam  Pt presents as flat and depressed; pt more depressed as he feels no one is willing to help him  Pt states he still has voices which are annoying but he can cope better with them  Pt denies SI/HI  Pt reports he feels safe    "

## 2018-08-01 NOTE — SOCIAL WORK
Jani Palma from [facility] MH/DS present on the unit; SW made him aware that pt has insurance and no longer needs [facility] funding

## 2018-08-01 NOTE — PROGRESS NOTES
Withdrawn to room  No elopement attempts  Complaint with medications  Maintained on Q 15 minute safety checks  No acting out, suicidal ideations, and/or homicidal behaviors  No changes in medical condition or complaints voiced  Fluids maintained at bedside to promote hydration

## 2018-08-02 PROCEDURE — 99232 SBSQ HOSP IP/OBS MODERATE 35: CPT | Performed by: PSYCHIATRY & NEUROLOGY

## 2018-08-02 RX ADMIN — DIPHENHYDRAMINE HYDROCHLORIDE 25 MG: 25 TABLET ORAL at 08:36

## 2018-08-02 RX ADMIN — PERPHENAZINE 12 MG: 4 TABLET, FILM COATED ORAL at 12:47

## 2018-08-02 RX ADMIN — PERPHENAZINE 16 MG: 16 TABLET, FILM COATED ORAL at 08:36

## 2018-08-02 RX ADMIN — OXCARBAZEPINE 450 MG: 150 TABLET ORAL at 08:36

## 2018-08-02 RX ADMIN — PERPHENAZINE 16 MG: 16 TABLET, FILM COATED ORAL at 17:30

## 2018-08-02 RX ADMIN — OXCARBAZEPINE 450 MG: 150 TABLET ORAL at 17:28

## 2018-08-02 RX ADMIN — TRAZODONE HYDROCHLORIDE 50 MG: 50 TABLET ORAL at 21:22

## 2018-08-02 RX ADMIN — BENZTROPINE MESYLATE 1 MG: 1 TABLET ORAL at 08:36

## 2018-08-02 RX ADMIN — BENZTROPINE MESYLATE 1 MG: 1 TABLET ORAL at 21:21

## 2018-08-02 RX ADMIN — LEVOTHYROXINE SODIUM 50 MCG: 25 TABLET ORAL at 06:43

## 2018-08-02 RX ADMIN — DIPHENHYDRAMINE HYDROCHLORIDE 25 MG: 25 TABLET ORAL at 17:28

## 2018-08-02 NOTE — PROGRESS NOTES
Progress Note - 4[address] [facility] 33 y o  male MRN: [id number]   Unit/Bed#: U 220-02 Encounter: [id number]    Behavior over the last 24 hours:  Roselyn Nieves was calm pleasant and cooperative during interview  Per nursing staff, there has been no change in patient's status  He still isolates to his room and occasionally walks the halls  He takes his medications as prescribed, and is eating 100% of his meals  He sleeps through the night without difficulty  He relates the voices in his head persist, despite medication adjustments  He is able to deal with them and relates they are not causing him any distress  He is hopeful that they may decrease or be alleviated by prescription changes but understands they may be baseline  He relates he is looking forward to being discharged  ROS: no complaints    Mental Status Evaluation:    Appearance:  casually dressed, dressed appropriately   Behavior:  normal, pleasant, cooperative   Speech:  normal rate and volume   Mood:  normal   Affect:  normal range and intensity   Thought Process:  organized, logical, coherent   Associations: intact associations   Thought Content:  no overt delusions   Perceptual Disturbances: auditory hallucinations   Risk Potential: Suicidal ideation - None  Homicidal ideation - None  Potential for aggression - No   Sensorium:  oriented to person, place, time/date and situation   Memory:  recent and remote memory grossly intact   Consciousness:  alert and awake   Attention:  Poor attention span and concentration   Insight:  fair   Judgment: fair   Gait/Station: normal gait/station   Motor Activity: no abnormal movements     Vital signs in last 24 hours:    Temp:  [97 8 °F (36 6 °C)-98 2 °F (36 8 °C)] 98 2 °F (36 8 °C)  HR:  [] 89  Resp:  [16-18] 16  BP: (106-129)/(55-60) 129/60    Laboratory results:  I have personally reviewed all pertinent laboratory/tests results      Progress Toward Goals: progressing    Assessment/Plan   Principal Problem:    Schizoaffective disorder, bipolar type (HCC)  Active Problems:    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)    Unspecified mood (affective) disorder (HCC)    Amphetamine abuse    Recommended Treatment:   1  Will increase perphenazine to a total of 42 mg daily in divided doses  2   Will continue to observe patient and adjust medications as needed  3   Discharge disposition and planning are ongoing  All current active medications have been reviewed  Current Facility-Administered Medications:  acetaminophen 650 mg Oral Q4H PRN Provider Cutover   aluminum-magnesium hydroxide-simethicone 30 mL Oral Q4H PRN Provider Cutover   benztropine 1 mg Oral BID Provider Cutover   chlorproMAZINE 25 mg Intramuscular Q6H PRN Provider Cutover   chlorproMAZINE 25 mg Oral Q6H PRN Provider Cutover   diphenhydrAMINE 50 mg Intramuscular Q4H PRN Provider Cutover   diphenhydrAMINE 25 mg Oral BID BRIDGET Kuhn   diphenhydrAMINE 50 mg Oral Q4H PRN Provider Cutover   levothyroxine 50 mcg Oral Early Morning Provider Cutover   magnesium hydroxide 30 mL Oral Daily PRN Provider Cutover   nicotine 21 mg Transdermal Daily Provider Cutover   OXcarbazepine 450 mg Oral Q12H [address] BRIDGET Kuhn   perphenazine 12 mg Oral Daily Manjeet Momin III, DO   perphenazine 16 mg Oral QAM Lucia Carter MD   perphenazine 16 mg Oral After Saloni Shepard MD   traZODone 50 mg Oral HS PRN Provider Cutover       Risks / Benefits of Treatment:    Risks, benefits, and possible side effects of medications explained to patient and patient verbalizes understanding and agreement for treatment  Counseling / Coordination of Care:      Patient's progress discussed with staff in treatment team meeting  Medications, treatment progress and treatment plan reviewed with patient

## 2018-08-02 NOTE — PROGRESS NOTES
Involvement in the community- Pt has been present in groups through the shift but minimally active  Retreated to after partial attendance to MHT group  Appearance- Unclean  Disheveled  Eye contact- Good  Affect- Flat  Anxious  Mood- Calm  SI/HI/[place]- Denies SI/HI  Reports active AH  Denies they have changed at all recently  Denies acute concern with such with exception of them still being present  Thought processes- Internally preoccupied  Poor concentration  Insight- Poor  Involvement in own care- Poor  Discharge plans- Pt is unsure of plans for d/c  States he has to find someone to stay with before he can leave  Medication- Compliant with scheduled cogentin  Requested/received PRN trazodone for sleep  Questions/Concerns- Denies any  Nursing Education/Encouragement- Coping with hallucinations  Distraction techniques  Pt is able to make needs known and agrees to report changes in condition/needs to staff  Nursing will continue to monitor and assess for changes and safety with 15 minute checks

## 2018-08-02 NOTE — PLAN OF CARE
Problem: Ineffective Coping  Goal: Participates in unit activities  Interventions:  - Provide therapeutic environment   - Provide required programming   - Redirect inappropriate behaviors     Outcome: Not Progressing  Pt has not been coming to any Art Therapy groups that has been offered  Art Therapist will continue to encourage pt to come to Art Therapy groups

## 2018-08-02 NOTE — ESCALATED TEAM TX
Tx team meeting held today  Continues to be isolative and have AH  AH is not improving but pt can cope with them better  Perphenazine will be increased  SW and pt struggling to figure out discharge disposition as pt's support is not returning SW's call  SW will have to looking into shelters for pt  Pt will discharge early next week  CM would like SW to look into pt's legal issues

## 2018-08-02 NOTE — PROGRESS NOTES
Pt has been withdrawn to his room during the day, with the exception of medication and meals  Pt has been letting the staff know of any issues at this time  Pt reports still having voices ,but reports that they are controlled at this time  Pt still continues to attend to his adls during the day  No other current issues to report at this time

## 2018-08-03 PROCEDURE — 99231 SBSQ HOSP IP/OBS SF/LOW 25: CPT | Performed by: PSYCHIATRY & NEUROLOGY

## 2018-08-03 RX ADMIN — BENZTROPINE MESYLATE 1 MG: 1 TABLET ORAL at 21:25

## 2018-08-03 RX ADMIN — LEVOTHYROXINE SODIUM 50 MCG: 25 TABLET ORAL at 06:24

## 2018-08-03 RX ADMIN — PERPHENAZINE 16 MG: 16 TABLET, FILM COATED ORAL at 08:42

## 2018-08-03 RX ADMIN — OXCARBAZEPINE 450 MG: 150 TABLET ORAL at 17:13

## 2018-08-03 RX ADMIN — DIPHENHYDRAMINE HYDROCHLORIDE 25 MG: 25 TABLET ORAL at 08:41

## 2018-08-03 RX ADMIN — PERPHENAZINE 12 MG: 4 TABLET, FILM COATED ORAL at 12:34

## 2018-08-03 RX ADMIN — DIPHENHYDRAMINE HYDROCHLORIDE 25 MG: 25 TABLET ORAL at 17:13

## 2018-08-03 RX ADMIN — PERPHENAZINE 16 MG: 16 TABLET, FILM COATED ORAL at 17:13

## 2018-08-03 RX ADMIN — BENZTROPINE MESYLATE 1 MG: 1 TABLET ORAL at 08:41

## 2018-08-03 RX ADMIN — OXCARBAZEPINE 450 MG: 150 TABLET ORAL at 08:41

## 2018-08-03 NOTE — SOCIAL WORK
SW met with pt who has been in bed all day  Pt continues to isolate  Pt reports increased depression (8/10) due to being stuck in hospital and no supports willing to assist him  Pt denies SI/HI/AVH  Pt continues to have AH with no improvements  Pt informed SW that he has had no luck getting a hold of Samia  Pt has yet to call friend David  SW informed pt that by Monday he need have contacted David and any other supports  SW offered assistance as long as he signs IGOR and provides contact number  Pt signed IGOR for SW to speak to his brother Summer White; he warned SW that Summer White has not been supportive  SW explained to pt that if we can't identify any supports we will have to look into shelters; CASTRO explained what that meant and how a  can help  SW spoke to pt about legal issues  Pt states he is out on bail and doesn't believe he has any pending legal charges or court dates  Pt believes he already served care home time  Pt states that he is supposed to call bail bonds every Wednesday but explained that he doesn't get through  SW will look into this and pt will sign necessary IGOR

## 2018-08-03 NOTE — PROGRESS NOTES
After attending snack in the community at the start of the shift, pt retreated to room to rest in bed  On approach for assessment, pt remained laying in bed  Offering minimal eye contact  Pt states he was just resting, denies acute concerns related to active AH  Pt continues to deny SI/HI  Is medication compliant  Requested/received PRN trazodone for sleep-will monitor for effectiveness  Pt is able to make needs known and agrees to report changes in condition/needs to staff  Nursing will continue to monitor and assess for changes and safety with 15 minute checks

## 2018-08-03 NOTE — PLAN OF CARE
Alteration in Thoughts and Perception     Treatment Goal: Gain control of psychotic behaviors/thinking, reduce/eliminate presenting symptoms and demonstrate improved reality functioning upon discharge Not Progressing        DISCHARGE PLANNING - CARE MANAGEMENT     Discharge to post-acute care or home with appropriate resources Not Progressing        Ineffective Coping     Participates in unit activities Not Progressing        PSYCHOSIS     Will report no hallucinations or delusions Not Progressing          Alteration in Thoughts and Perception     Refrain from acting on delusional thinking/internal stimuli Progressing     Recognize dysfunctional thoughts, communicate reality-based thoughts at the time of discharge Progressing        Anxiety     Anxiety is at manageable level Progressing        Nutrition/Hydration-ADULT     Nutrient/Hydration intake appropriate for improving, restoring or maintaining nutritional needs Progressing

## 2018-08-03 NOTE — PROGRESS NOTES
Patient alert, awake,  Ate all meals with peers, then back to bed after eating  Pt remains flat, poor eye contact, disheveled, and stays in bed most of this shift  Did come out for medications, meals, short walks around unit and to make phone calls to debtors in regards to past due bills  SW  Unable to get a hold of sister in law for possible discharge planning  Will maintain on safety precautions and 15 minute checks  No needs identified

## 2018-08-03 NOTE — PROGRESS NOTES
Progress Note - [address] [facility] 33 y o  male MRN: [id number]   Unit/Bed#: U 220-02 Encounter: [id number]    Behavior over the last 24 hours:  Mariposa Khoury was calm pleasant and cooperative during interview  Per nursing staff he is taking medications as prescribed, and is compliant with meals  He is isolative to his room when not at meals  He is still hearing voices despite increase in perphenazine  He reports that today he is feeling very sleepy  He denies any suicidal or homicidal ideation and appears in no apparent distress  He has not had any behavioral issues on the unit  ROS: reports tiredness    Mental Status Evaluation:    Appearance:  age appropriate, casually dressed   Behavior:  pleasant, cooperative   Speech:  normal rate and volume   Mood:  normal   Affect:  flat   Thought Process:  organized, logical, coherent   Associations: intact associations   Thought Content:  normal, no overt delusions   Perceptual Disturbances: auditory hallucinations   Risk Potential: Suicidal ideation - None  Homicidal ideation - None  Potential for aggression - No   Sensorium:  oriented to person, place, time/date and situation   Memory:  recent and remote memory grossly intact   Consciousness:  alert and awake   Attention:  Decreased attention span and concentration   Insight:  fair   Judgment: fair   Gait/Station: normal gait/station   Motor Activity: no abnormal movements     Vital signs in last 24 hours:    Temp:  [97 8 °F (36 6 °C)-98 3 °F (36 8 °C)] 98 3 °F (36 8 °C)  HR:  [101-125] 101  Resp:  [16-18] 16  BP: (111-124)/(56-73) 124/73    Laboratory results:  I have personally reviewed all pertinent laboratory/tests results      Progress Toward Goals: discharge planning    Assessment/Plan   Principal Problem:    Schizoaffective disorder, bipolar type (HCC)  Active Problems:    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)    Unspecified mood (affective) disorder ([facility] [address] )    Amphetamine abuse    Recommended Treatment:   1  We will continue current medications and treatment plan as determined by Psychiatric Care Team   2   Will not increase perphenazine this date due to patient's complaints of daytime sleepiness  3   Discharge disposition and planning is ongoing  All current active medications have been reviewed  Current Facility-Administered Medications:  acetaminophen 650 mg Oral Q4H PRN Provider Cutover   aluminum-magnesium hydroxide-simethicone 30 mL Oral Q4H PRN Provider Cutover   benztropine 1 mg Oral BID Provider Cutover   chlorproMAZINE 25 mg Intramuscular Q6H PRN Provider Cutover   chlorproMAZINE 25 mg Oral Q6H PRN Provider Cutover   diphenhydrAMINE 50 mg Intramuscular Q4H PRN Provider Cutover   diphenhydrAMINE 25 mg Oral BID BRIDGET Kuhn   diphenhydrAMINE 50 mg Oral Q4H PRN Provider Cutover   levothyroxine 50 mcg Oral Early Morning Provider Cutover   magnesium hydroxide 30 mL Oral Daily PRN Provider Cutover   nicotine 21 mg Transdermal Daily Provider Cutover   OXcarbazepine 450 mg Oral Q12H [address] BRIDGET Kuhn   perphenazine 12 mg Oral Daily Ale Stewart III, DO   perphenazine 16 mg Oral QAM Thu Hurley MD   perphenazine 16 mg Oral After Dakota Burroughs MD   traZODone 50 mg Oral HS PRN Provider Cutover       Risks / Benefits of Treatment:    Risks, benefits, and possible side effects of medications explained to patient and patient verbalizes understanding and agreement for treatment  Counseling / Coordination of Care:      Patient's progress discussed with staff in treatment team meeting  Medications, treatment progress and treatment plan reviewed with patient

## 2018-08-03 NOTE — SOCIAL WORK
SW met with patient for update and to review the Treatment Plan  The patient remains withdrawn and largely seclusive to room and self  He has remain controlled with no impulsive outbursts, though continues to experience A/V hallucinations  Reports his isolative behavior is due to "the voices bothering me " Vague, ambivalent and indifferent  Unable to identify any concrete plans for post d/c  Treatment Plan revewed and signed by the patient

## 2018-08-03 NOTE — SOCIAL WORK
CASTRO attempted to contact pt's brother Vianne Holiday ([phone])  CASTRO left message and requested a return phone call  CASTRO received return phone call  CASTRO explained situation with discharge and lack of plan  Viann Holiday informed CASTRO that at this time he and his brothers want to help pt but don't have room to take him in  Vianne Holiday agreed to reach out to [place] and encourage her to call CASTRO ERAZO informed Vianne Holiday that come Monday if there is no plan pt will have to go to shelter

## 2018-08-04 PROCEDURE — 99231 SBSQ HOSP IP/OBS SF/LOW 25: CPT | Performed by: PSYCHIATRY & NEUROLOGY

## 2018-08-04 RX ADMIN — OXCARBAZEPINE 450 MG: 150 TABLET ORAL at 08:30

## 2018-08-04 RX ADMIN — DIPHENHYDRAMINE HYDROCHLORIDE 25 MG: 25 TABLET ORAL at 17:19

## 2018-08-04 RX ADMIN — PERPHENAZINE 16 MG: 16 TABLET, FILM COATED ORAL at 17:19

## 2018-08-04 RX ADMIN — BENZTROPINE MESYLATE 1 MG: 1 TABLET ORAL at 08:31

## 2018-08-04 RX ADMIN — LEVOTHYROXINE SODIUM 50 MCG: 25 TABLET ORAL at 06:00

## 2018-08-04 RX ADMIN — TRAZODONE HYDROCHLORIDE 50 MG: 50 TABLET ORAL at 20:55

## 2018-08-04 RX ADMIN — DIPHENHYDRAMINE HYDROCHLORIDE 25 MG: 25 TABLET ORAL at 08:30

## 2018-08-04 RX ADMIN — OXCARBAZEPINE 450 MG: 150 TABLET ORAL at 17:19

## 2018-08-04 RX ADMIN — PERPHENAZINE 12 MG: 4 TABLET, FILM COATED ORAL at 12:39

## 2018-08-04 RX ADMIN — BENZTROPINE MESYLATE 1 MG: 1 TABLET ORAL at 20:50

## 2018-08-04 RX ADMIN — PERPHENAZINE 16 MG: 16 TABLET, FILM COATED ORAL at 08:31

## 2018-08-04 NOTE — PROGRESS NOTES
Pt was visible this morning during breakfast and during the day  Pt denies  Any additional complaints at this time  Pt does report auditory hallucinations at this time, but feels they are controlled

## 2018-08-04 NOTE — NURSING NOTE
Pt isolative to room;foudn to be in bed early; easily awoke; states CAH ongoing; denies they are harmful to anyone in nature; states they are just an annoyance; feels he has been coping better with the [facility]; states he feels safe on unit and would off the unit;no acute behavioral issues noted  Q 15 min checks maintained

## 2018-08-04 NOTE — NURSING NOTE
[id number]  Pt found to be sleeping on most of authors rounds  No acute behavioral issues noted  Q 15 min checks maintained  Falling star protocol in place

## 2018-08-04 NOTE — PROGRESS NOTES
Progress Note - [address] 33 y o  male MRN: [id number]  Unit/Bed#: [facility] 220-02 Encounter: [id number]    Assessment/Plan   Principal Problem:    Schizoaffective disorder, bipolar type (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)    Unspecified mood (affective) disorder (HCC)    Amphetamine abuse      Behavior over the last 24 hours:    Staff reports that he remains medication compliant, still withdrawn and minimally socially interactive with peers  He was cooperative during today's interview  He states he hasn't noticed any improvement on medications, yet also states that the voices are "less constant" and quieter  He is unsure what his disposition plan is; "no one is cooperating" (family not returning calls) and states he may have to go to either a shelter or e rehab  Sleep: normal  Appetite: normal  Medication side effects: No  ROS: no complaints    Mental Status Evaluation:  Appearance:  age appropriate and disheveled   Behavior:  cooperative, but minimally conversational   Speech:  soft and sparse   Mood:  constricted   Affect:  constricted   Thought Process:  poverty of thoughts   Thought Content:  normal   Perceptual Disturbances: Auditory hallucinations without commands   Risk Potential: denies SI/HI   Sensorium:  person, place and time/date   Cognition:  grossly intact   Consciousness:  alert and awake    Attention: attention span appeared shorter than expected for age   Insight:  impaired   Judgment: limited   Gait/Station: normal gait/station   Motor Activity: no abnormal movements     Progress Toward Goals: Ongoing    Recommended Treatment: Continue with group therapy, milieu therapy and occupational therapy  Risks, benefits and possible side effects of Medications:   Risks, benefits, and possible side effects of medications explained to patient and patient verbalizes understanding        Medications:   all current active meds have been reviewed, continue current psychiatric medications and current meds:   Current Facility-Administered Medications   Medication Dose Route Frequency    acetaminophen (TYLENOL) tablet 650 mg  650 mg Oral Q4H PRN    aluminum-magnesium hydroxide-simethicone (MYLANTA) 200-200-20 mg/5 mL oral suspension 30 mL  30 mL Oral Q4H PRN    benztropine (COGENTIN) tablet 1 mg  1 mg Oral BID    chlorproMAZINE (THORAZINE) injection SOLN 25 mg  25 mg Intramuscular Q6H PRN    chlorproMAZINE (THORAZINE) tablet 25 mg  25 mg Oral Q6H PRN    diphenhydrAMINE (BENADRYL) injection 50 mg  50 mg Intramuscular Q4H PRN    diphenhydrAMINE (BENADRYL) tablet 25 mg  25 mg Oral BID    diphenhydrAMINE (BENADRYL) tablet 50 mg  50 mg Oral Q4H PRN    levothyroxine tablet 50 mcg  50 mcg Oral Early Morning    magnesium hydroxide (MILK OF MAGNESIA) 400 mg/5 mL oral suspension 30 mL  30 mL Oral Daily PRN    nicotine (NICODERM CQ) 21 mg/24 hr TD 24 hr patch 21 mg  21 mg Transdermal Daily    OXcarbazepine (TRILEPTAL) tablet 450 mg  450 mg Oral Q12H [facility]    perphenazine tablet 12 mg  12 mg Oral Daily    perphenazine tablet 16 mg  16 mg Oral QAM    perphenazine tablet 16 mg  16 mg Oral After Dinner    traZODone (DESYREL) tablet 50 mg  50 mg Oral HS PRN     Labs: Reviewed    Counseling / Coordination of Care  Total floor / unit time spent today 15 minutes  Greater than 50% of total time was spent with the patient and / or family counseling and / or coordination of care   A description of the counseling / coordination of care: 15

## 2018-08-05 PROCEDURE — 99231 SBSQ HOSP IP/OBS SF/LOW 25: CPT | Performed by: PSYCHIATRY & NEUROLOGY

## 2018-08-05 RX ADMIN — OXCARBAZEPINE 450 MG: 150 TABLET ORAL at 08:11

## 2018-08-05 RX ADMIN — BENZTROPINE MESYLATE 1 MG: 1 TABLET ORAL at 08:12

## 2018-08-05 RX ADMIN — DIPHENHYDRAMINE HYDROCHLORIDE 25 MG: 25 TABLET ORAL at 08:12

## 2018-08-05 RX ADMIN — BENZTROPINE MESYLATE 1 MG: 1 TABLET ORAL at 20:21

## 2018-08-05 RX ADMIN — LEVOTHYROXINE SODIUM 50 MCG: 25 TABLET ORAL at 05:23

## 2018-08-05 RX ADMIN — PERPHENAZINE 12 MG: 4 TABLET, FILM COATED ORAL at 12:23

## 2018-08-05 RX ADMIN — PERPHENAZINE 16 MG: 16 TABLET, FILM COATED ORAL at 17:52

## 2018-08-05 RX ADMIN — DIPHENHYDRAMINE HYDROCHLORIDE 25 MG: 25 TABLET ORAL at 17:52

## 2018-08-05 RX ADMIN — OXCARBAZEPINE 450 MG: 150 TABLET ORAL at 17:52

## 2018-08-05 NOTE — PROGRESS NOTES
Pt has been withdrawn to his room, pt denies current si/hi at this time  Pt admits to auditory hallucinations, pt denies any issues with the voices at this time  Pt has been medication and meal compliant

## 2018-08-05 NOTE — PROGRESS NOTES
No change in continue noted  Out in the community more  Socialization is fair  Maintained on Q 15 minute safety checks  No acting out, suicidal ideations, and/or homicidal behaviors  No changes in medical condition or complaints voiced  Fluids maintained at bedside to promote hydration

## 2018-08-06 PROBLEM — F15.10 AMPHETAMINE ABUSE (HCC): Chronic | Status: ACTIVE | Noted: 2018-06-24

## 2018-08-06 PROCEDURE — 99232 SBSQ HOSP IP/OBS MODERATE 35: CPT | Performed by: PSYCHIATRY & NEUROLOGY

## 2018-08-06 RX ORDER — PERPHENAZINE 4 MG/1
TABLET, FILM COATED ORAL
Qty: 90 TABLET | Refills: 0 | Status: SHIPPED | OUTPATIENT
Start: 2018-08-06 | End: 2018-08-06

## 2018-08-06 RX ORDER — OXCARBAZEPINE 300 MG/1
300 TABLET, FILM COATED ORAL EVERY 12 HOURS SCHEDULED
Qty: 90 TABLET | Refills: 0 | Status: SHIPPED | OUTPATIENT
Start: 2018-08-06 | End: 2018-08-06

## 2018-08-06 RX ORDER — BENZTROPINE MESYLATE 1 MG/1
1 TABLET ORAL 2 TIMES DAILY
Qty: 60 TABLET | Refills: 0 | Status: SHIPPED | OUTPATIENT
Start: 2018-08-06

## 2018-08-06 RX ORDER — LEVOTHYROXINE SODIUM 0.05 MG/1
50 TABLET ORAL
Qty: 30 TABLET | Refills: 0 | Status: SHIPPED | OUTPATIENT
Start: 2018-08-07 | End: 2022-06-29

## 2018-08-06 RX ORDER — PERPHENAZINE 16 MG/1
TABLET, FILM COATED ORAL
Qty: 60 TABLET | Refills: 0 | Status: SHIPPED | OUTPATIENT
Start: 2018-08-06 | End: 2018-08-09

## 2018-08-06 RX ORDER — PERPHENAZINE 4 MG/1
TABLET, FILM COATED ORAL
Qty: 90 TABLET | Refills: 0 | Status: SHIPPED | OUTPATIENT
Start: 2018-08-06

## 2018-08-06 RX ORDER — DIPHENHYDRAMINE HCL 25 MG
25 TABLET ORAL 2 TIMES DAILY
Qty: 60 TABLET | Refills: 0 | Status: SHIPPED | OUTPATIENT
Start: 2018-08-06 | End: 2022-07-02

## 2018-08-06 RX ORDER — OXCARBAZEPINE 300 MG/1
300 TABLET, FILM COATED ORAL 3 TIMES DAILY
Qty: 90 TABLET | Refills: 0 | Status: SHIPPED | OUTPATIENT
Start: 2018-08-06 | End: 2022-06-29

## 2018-08-06 RX ADMIN — LEVOTHYROXINE SODIUM 50 MCG: 25 TABLET ORAL at 06:00

## 2018-08-06 RX ADMIN — BENZTROPINE MESYLATE 1 MG: 1 TABLET ORAL at 21:22

## 2018-08-06 RX ADMIN — OXCARBAZEPINE 450 MG: 150 TABLET ORAL at 08:59

## 2018-08-06 RX ADMIN — BENZTROPINE MESYLATE 1 MG: 1 TABLET ORAL at 08:59

## 2018-08-06 RX ADMIN — PERPHENAZINE 12 MG: 4 TABLET, FILM COATED ORAL at 12:38

## 2018-08-06 RX ADMIN — PERPHENAZINE 16 MG: 16 TABLET, FILM COATED ORAL at 08:59

## 2018-08-06 RX ADMIN — OXCARBAZEPINE 450 MG: 150 TABLET ORAL at 17:37

## 2018-08-06 RX ADMIN — PERPHENAZINE 16 MG: 16 TABLET, FILM COATED ORAL at 17:37

## 2018-08-06 RX ADMIN — DIPHENHYDRAMINE HYDROCHLORIDE 25 MG: 25 TABLET ORAL at 17:37

## 2018-08-06 RX ADMIN — DIPHENHYDRAMINE HYDROCHLORIDE 25 MG: 25 TABLET ORAL at 08:59

## 2018-08-06 NOTE — PLAN OF CARE
PSYCHOSIS     Will report no hallucinations or delusions Adequate for Discharge          Alteration in Thoughts and Perception     Treatment Goal: Gain control of psychotic behaviors/thinking, reduce/eliminate presenting symptoms and demonstrate improved reality functioning upon discharge Completed     Refrain from acting on delusional thinking/internal stimuli Completed     Recognize dysfunctional thoughts, communicate reality-based thoughts at the time of discharge Completed        Anxiety     Anxiety is at manageable level Completed        Ineffective Coping     Participates in unit activities Completed        Nutrition/Hydration-ADULT     Nutrient/Hydration intake appropriate for improving, restoring or maintaining nutritional needs Completed          PSYCHOSIS     Will report no hallucinations or delusions Adequate for Discharge          Alteration in Thoughts and Perception     Treatment Goal: Gain control of psychotic behaviors/thinking, reduce/eliminate presenting symptoms and demonstrate improved reality functioning upon discharge Completed     Refrain from acting on delusional thinking/internal stimuli Completed     Recognize dysfunctional thoughts, communicate reality-based thoughts at the time of discharge Completed        Anxiety     Anxiety is at manageable level Completed        Ineffective Coping     Participates in unit activities Completed        Nutrition/Hydration-ADULT     Nutrient/Hydration intake appropriate for improving, restoring or maintaining nutritional needs Completed

## 2018-08-06 NOTE — PROGRESS NOTES
Pt has been withdrawn to his room again today, denies any SI/hi at this time  Pt continues to auditory hallucinations, Pt denies any issues with the voices at this time  Pt has been medication and meal compliant

## 2018-08-06 NOTE — SOCIAL WORK
CASTRO faxed completed TCM referral to [facility]  CASTRO received call from Peña at [facility] stating that fax is coming through and that she will look at it and call back if there are any issues

## 2018-08-06 NOTE — SOCIAL WORK
SW received message from pt's brother Percy Whiting stating that he works until 3:30PM and will be up sometimes after that to pick pt up  SW left message requesting call back stating she needs his address and approximate time for discharge

## 2018-08-06 NOTE — PROGRESS NOTES
Progress Note - [address] 33 y o  male MRN: [id number]  Unit/Bed#: [facility] 220-02 Encounter: [id number]    Assessment/Plan   Principal Problem:    Schizoaffective disorder, bipolar type ([address] )  Active Problems:    Amphetamine abuse   Is a 25 min psychiatric progress note on patient  15 min were spent in coordination care of treatment of this patient  Coordination of care consisted of meeting with the multi-disciplinary treatment team discussed patient's progress, documentation, medication, behavior over the last 24 hr   Per the nursing staff on the patient's eating 100% of his meals he did sleep well through the night  Patient was to be discharged today  Unfortunately, we found out at the 11th our that the patient was going to be transported back to his home which is uninhabitable  According to family report, the patient has a tar over the roof and has no running water or electricity  Given the extreme heat outside and the fact that there is no protection from the elements, the patient will not be discharged today  At  his point, I did say that the patient is currently homeless  Discharge has been canceled  Behavior over the last 24 hours:  unchanged  Sleep: normal  Appetite: normal  Medication side effects: No  ROS: no complaints    Mental Status Evaluation:  Appearance:  disheveled   Behavior:  guarded   Speech:  soft   Mood:  euthymic   Affect:  blunted   Thought Process:  disorganized   Thought Content:  normal   Perceptual Disturbances:  Auditory hallucinations without commands   Risk Potential: none   Sensorium:  person, place and time/date   Cognition:  grossly intact   Consciousness:  alert and awake    Attention: attention span appeared shorter than expected for age   Insight:  poor   Judgment: poor   Gait/Station: normal gait/station   Motor Activity: no abnormal movements     Progress Toward Goals: Unchanged    Recommended Treatment: Continue with group therapy, milieu therapy and occupational therapy  Risks, benefits and possible side effects of Medications:   Risks, benefits, and possible side effects of medications explained to patient and patient verbalizes understanding  Medications: continue current psychiatric medications  Labs: Reviewed    Counseling / Coordination of Care  Total floor / unit time spent today 25 minutes  Greater than 50% of total time was spent with the patient and / or family counseling and / or coordination of care   A description of the counseling / coordination of care: 25

## 2018-08-06 NOTE — SOCIAL WORK
CASTRO spoke to pt's brother Ирина Ashby who informed CASTRO that he will pick him up in 15-20 minutes  SW asked for his address and he then informed SW that pt is returning to his own home  SW informed him that she heard home was not in good condition  SW place Ирина Ashby on hold and spoke to [facility]  Edith informed CASTRO that we can't let pt go if he has no food or electric; CASTRO informed Ирина Ashby informed CASTRO that other brother has been working on home and it definitely has electric  Ирина Ashby informed CASTRO that he will call brother about water and then call SW back  SW left message for Ирина Ashby cancelling discharge for today as we need confirmation that the home has water and electric  CASTRO informed Ирина Ashby that if we can work it out tomorrow we can discharge pt tomorrow

## 2018-08-06 NOTE — DISCHARGE SUMMARY
Discharge Summary - [address] 33 y o  male MRN: [id number]  Unit/Bed#: [facility] 996-57 Encounter: [id number]     Admission Date:   Admission Orders     Ordered        06/22/18 1140  Admit Patient to [address] [place] [address], Rehab, Skilled Nursing)  Once                   Discharge Date: 8-6-2018    Attending Psychiatrist: Shanna Sin MD    Reason for Admission/HPI:  This is a discharge summary on patient  Patient is a 27-year-old male who lives in [address] [place] [address]  He has a history of substance abuse which is extensive  He was recently released from penitentiary where he served time for drug-related offenses  In the past 2 weeks reports increased feelings that someone is out to harm him in some way  He has used K2, bath salts and crystal meth in the past   Most recently, he was using crystal meth  He was also smoking marijuana  During the session, he was laughing inappropriately and stated he was not eating or sleeping well  He was brought to the emergency room by a friend because of his behavior  He was hearing voices telling him to get rid of  people in my ceiling  Given the patient's history of substance abuse, and his current level psychosis on admission, it was necessary for him to come in the hospital for treatment  History of Present Illness   Patient is a 33 y o  male presents with Signs of acute psychosis  Patient was admitted to psychiatric unit on a voluntarily 201 commitment basis  Primary complaints include: hearing voices  Onset of symptoms was gradual starting a few days ago with rapidly worsening course since that time  Psychosocial Stressors: family, health, drug and alcohol and social     Hospital Course:   Hospital course has been extensive  During his hospital stay, the patient was trialed on multiple antipsychotic medications  We had trial Seroquel, Zyprexa, Risperdal, Haldol, Thorazine and finally perphenazine    Out of all these antipsychotics, the patient responded the best to perphenazine  It decreased his agitation, and help to calm the voices  Unfortunately, it did not get rid of the voices  He still is experiencing chronic auditory hallucinations but they are not of a command nature and they are not troubling to him  He was also placed on the mood stabilizer, Trileptal, because of his level of agitation at times and when he was agitated, he was trying to get out of the unit  Since being on the combination of Trileptal and perphenazine, he has been calmer, more cooperative, out of his room and attending groups at times  He was having difficulty with placement post discharge and finally now tells us that his brother will be able to pick about hopefully today after 3:30PM   On day of discharge, the patient is denying any suicidal homicidal ideation  He is still experiencing auditory hallucinations, but as was noted earlier in this discharge summary, these are chronic and persistent  Again they are not of a command nature and he will be ready to be discharged today  He is discharged on the following medications, Cogentin 1 mg b i d , Benadryl 25 mg b i d , perphenazine 12 mg at noon and 16 mg b i d , Trileptal 300 mg t i d   All medications were ordered for 1 month supply and sent to the patient's pharmacy today  Patient's labs were reviewed daily and all labs are well within normal limits  The patient will follow up with Dr Kenny Saavedra, psychiatry,on 6-  Mental Status at time of Discharge:     Appearance:  casually dressed   Behavior:  normal   Speech:  soft   Mood:  euthymic   Affect:  constricted   Thought Process:  disorganized   Thought Content:  normal   Perceptual Disturbances:  Auditory hallucinations without commands   Risk Potential: none   Sensorium:  person, place and time/date   Cognition:  impaired due to illness   Consciousness:  alert and awake    Attention: attention span appeared shorter than expected for age   Insight:  limited Judgment: limited   Gait/Station: normal gait/station   Motor Activity: no abnormal movements       Discharge Diagnosis:   Psychosis NOS      Resolved Problems  Date Reviewed: 8/6/2018    None              Discharge Medications:  See after visit summary for reconciled discharge medications provided to patient and family  Discharge instructions/Information to patient and family:   See after visit summary for information provided to patient and family  Provisions for Follow-Up Care:  See after visit summary for information related to follow-up care and any pertinent home health orders  Discharge Statement   I spent 30 minutes discharging the patient  This time was spent on the day of discharge  I had direct contact with the patient on the day of discharge  Additional documentation is required if more than 30 minutes were spent on discharge

## 2018-08-06 NOTE — SOCIAL WORK
SW met with pt privately  Pt found to be laying in bed  SW questioned pt about his brother Fazal Lara  Pt informed SW that he spoke to [facility] and Fazal Lara said he would come get him sometime today  Pt signed IGOR so SW can speak to brother  Pt agreed to psychiatry and case management services  Pt declined referral for substance abuse treatment and individual therapy  SW had pt sign IGOR for psychiatry and case management services  Pt presents as calm and brighter today  Pt continues to have AH but is able to cope better with them  Pt denies SI/HI/VH  SW left message for Dr Judge Reyes office and requested return call to set up services  SW left message for Betina Glover at [facility] and left message requesting return call to discuss services      SW received return call from Dr Judge Reyes office; CASTRO scheduled appointment with Dr Nancy Oconnor for Tuesday August 28, 2018 at [place] [id number]

## 2018-08-06 NOTE — SOCIAL WORK
SW attempted to return pt's brother's phone call, Deniz ERAZO left message and requested a return phone call

## 2018-08-06 NOTE — PROGRESS NOTES
Patient alert, awake, ate breakfast with peers, back to bed after eating  Pt remains flat with poor eye contact, c/o non command auditory hallucinations  Patient states "I'm doing ok" when asked how he is doing by this nurse  He says he has mild anxiety, denies si hi and "has been ready to go home for a long time"  Pt seen by provider for possible discharge to [facility] today  SW awaiting call back from [facility] agreement  Will maintain on safety precautions and 15 minute checks  No needs identified

## 2018-08-06 NOTE — PLAN OF CARE
Problem: DISCHARGE PLANNING - CARE MANAGEMENT  Goal: Discharge to post-acute care or home with appropriate resources  INTERVENTIONS:  - Conduct assessment to determine patient/family and health care team treatment goals, and need for post-acute services based on payer coverage, community resources, and patient preferences, and barriers to discharge  - Address psychosocial, clinical, and financial barriers to discharge as identified in assessment in conjunction with the patient/family and health care team  - Arrange appropriate level of post-acute services according to patient's   needs and preference and payer coverage in collaboration with the physician and health care team  - Communicate with and update the patient/family, physician, and health care team regarding progress on the discharge plan  - Arrange appropriate transportation to post-acute venues    Outcome: Adequate for Discharge

## 2018-08-07 PROCEDURE — 99231 SBSQ HOSP IP/OBS SF/LOW 25: CPT | Performed by: PSYCHIATRY & NEUROLOGY

## 2018-08-07 RX ADMIN — PERPHENAZINE 12 MG: 4 TABLET, FILM COATED ORAL at 12:30

## 2018-08-07 RX ADMIN — DIPHENHYDRAMINE HYDROCHLORIDE 25 MG: 25 TABLET ORAL at 08:13

## 2018-08-07 RX ADMIN — OXCARBAZEPINE 450 MG: 150 TABLET ORAL at 17:08

## 2018-08-07 RX ADMIN — BENZTROPINE MESYLATE 1 MG: 1 TABLET ORAL at 21:29

## 2018-08-07 RX ADMIN — DIPHENHYDRAMINE HYDROCHLORIDE 25 MG: 25 TABLET ORAL at 17:09

## 2018-08-07 RX ADMIN — PERPHENAZINE 16 MG: 16 TABLET, FILM COATED ORAL at 17:08

## 2018-08-07 RX ADMIN — BENZTROPINE MESYLATE 1 MG: 1 TABLET ORAL at 08:13

## 2018-08-07 RX ADMIN — PERPHENAZINE 16 MG: 16 TABLET, FILM COATED ORAL at 08:13

## 2018-08-07 RX ADMIN — OXCARBAZEPINE 450 MG: 150 TABLET ORAL at 08:13

## 2018-08-07 NOTE — PROGRESS NOTES
Patient bright alert, ate breakfast with peers, back to bed after eating  Pt remains flat, withdrawn, poor eye contact  Patient refused lipid panel blood draw despite education and encouragement as he explains he does not like needles  Provider made aware and the order for lipids was discontinued  Pt seen by provider, todays discharge is pending his home situation and where he will be living  Pt denies si hi and visual hallucinations, c/o non command auditory hallucinations which are controlled  Will maintain on safety precautions and 15 minute checks  No needs identified

## 2018-08-07 NOTE — SOCIAL WORK
CASTRO received return phone call from pt's friend Ирина Ashby informed SW that pt has electricity but no water  Ирина Ashby informed SW that pt can stay with him for a few days while his brother gets the water hooked up and the home becomes livable  CASTRO informed Ирина Ashby that she will review with treatment team and if they agree we will discharge pt tomorrow  Ирина Ashby and CASTRO agreed to talk tomorrow between 12p and 12:30p

## 2018-08-07 NOTE — PLAN OF CARE
Problem: Ineffective Coping  Goal: Participates in unit activities  Interventions:  - Provide therapeutic environment   - Provide required programming   - Redirect inappropriate behaviors     Outcome: Not Progressing  Pt has not been attending any Art Therapy groups that have been offered  Will continue to encourage and follow

## 2018-08-07 NOTE — PROGRESS NOTES
Patient stable  Non-reactive to continual auditory hallucination  On the peripheral of the community  Maintained on Q 15 minute safety checks  No acting out, suicidal ideations, and/or homicidal behaviors  No changes in medical condition or complaints voiced  Fluids maintained at bedside to promote hydration

## 2018-08-07 NOTE — SOCIAL WORK
CASTRO left message for pt's friend Fazal Lara requesting a call back to discuss possible discharge for tomorrow  CASTRO met with pt who states that friend Fazal Lara said he will take him in until his house is livable  CASTRO informed pt that she needs to hear this from his friend  CASTRO informed pt that if friend confirms this we can discharge him tomorrow

## 2018-08-08 VITALS
BODY MASS INDEX: 23.87 KG/M2 | DIASTOLIC BLOOD PRESSURE: 59 MMHG | RESPIRATION RATE: 16 BRPM | SYSTOLIC BLOOD PRESSURE: 124 MMHG | TEMPERATURE: 96.3 F | OXYGEN SATURATION: 96 % | WEIGHT: 192 LBS | HEIGHT: 75 IN | HEART RATE: 95 BPM

## 2018-08-08 PROCEDURE — 99239 HOSP IP/OBS DSCHRG MGMT >30: CPT | Performed by: PSYCHIATRY & NEUROLOGY

## 2018-08-08 RX ADMIN — DIPHENHYDRAMINE HYDROCHLORIDE 25 MG: 25 TABLET ORAL at 08:24

## 2018-08-08 RX ADMIN — PERPHENAZINE 16 MG: 16 TABLET, FILM COATED ORAL at 08:24

## 2018-08-08 RX ADMIN — BENZTROPINE MESYLATE 1 MG: 1 TABLET ORAL at 08:23

## 2018-08-08 RX ADMIN — OXCARBAZEPINE 450 MG: 150 TABLET ORAL at 08:24

## 2018-08-08 RX ADMIN — PERPHENAZINE 12 MG: 4 TABLET, FILM COATED ORAL at 12:25

## 2018-08-08 RX ADMIN — LEVOTHYROXINE SODIUM 50 MCG: 25 TABLET ORAL at 06:29

## 2018-08-08 NOTE — PROGRESS NOTES
Patient withdrawn to room , did not attend group or snack time or social movie  Pt remained in room for most of the afternoon and evening  Pt aware we are awaiting SW for final word on transportation and discharge plan  Will maintain on safety precautions and 15 minute checks   No needs identified,

## 2018-08-08 NOTE — DISCHARGE INSTRUCTIONS
Psychotic Disorder   WHAT YOU NEED TO KNOW:   A psychotic disorder is a medical condition that causes hallucinations and delusions  Hallucinations are seeing, hearing, tasting, or feeling things that are not real  Delusions are beliefs that something is real, true, or right when it is not  These false beliefs do not go away even if there is proof that they are not true  You may believe someone is spying on you, after you, or controlling your mind  You may also believe there is something wrong with how your body works  Schizophrenia and schizoaffective disorder are examples of psychotic disorders  DISCHARGE INSTRUCTIONS:   Call 911 if:   · You feel like you could harm yourself or someone else  Contact your healthcare provider if:   · Your symptoms do not improve  · You cannot make it to your next appointment  · You have new symptoms  · You have questions or concerns about your condition or care  Medicines  may be given to decrease your symptoms  You may need 1 or more medicines  You may need to take your medicine for several weeks before you begin to feel better  Tell your healthcare provider about any side effects or problems you have with your medicines  The type or amount of medicine may need to be changed  Get support: It may be difficult to cope with your illness  You may feel lonely, anxious, or depressed  It may help to join a support group  A support group lets you talk with others who have a mental illness  For information and more support visit:  · [facility]  [address] [facility]  , [address] [place] [address]  Phone: [phone]  Phone: [phone]  Web Address: http://[employer]/  Boundary  Self-care:   · Do not drink alcohol or use illegal drugs  Alcohol and illegal drugs can make your symptoms worse  Ask your healthcare provider for information if you currently drink alcohol or use illegal drugs and need help to quit  · Do not smoke    Nicotine and other chemicals in cigarettes and cigars can cause lung damage  They can also decrease how well your medicine works  Ask your healthcare provider for information if you currently smoke and need help to quit  E-cigarettes or smokeless tobacco still contain nicotine  Talk to your healthcare provider before you use these products  · Exercise regularly  Exercise can help improve your mood and decrease symptoms  Ask about the best exercise plan for you  · Manage your stress  Stress can make your condition worse  Ask your healthcare provider for more information about practicing mindfulness and deep breathing exercises to help decrease your stress  You may learn other ways to manage stress during therapy  Follow up with your healthcare provider as directed:  Write down your questions so you remember to ask them during your visits  © 2017 [facility] Information is for End User's use only and may not be sold, redistributed or otherwise used for commercial purposes  All illustrations and images included in CareNotes® are the copyrighted property of A D A M , Inc  or a[employer] Ma  The above information is an  only  It is not intended as medical advice for individual conditions or treatments  Talk to your doctor, nurse or pharmacist before following any medical regimen to see if it is safe and effective for you  Schizoaffective Disorder   WHAT YOU NEED TO KNOW:   Schizoaffective disorder is a long-term mental illness that may change how you think, feel, and act around others  You may not know what is real and what is not real    DISCHARGE INSTRUCTIONS:   Medicines:   · Antipsychotics: These medicines help decrease psychotic symptoms or severe agitation  You may need antiparkinson medicine to control muscle stiffness, twitches, and restlessness caused by antipsychotic medicines  · Antianxiety medicine:   This medicine may be given to decrease anxiety and help you feel calm and relaxed  · Antidepressants: These medicines are given to decrease or stop the symptoms of depression, anxiety, and behavior problems  · Mood stabilizers: These medicines help control mood swings  · Anticonvulsants: This medicine is given to control seizures  It may also be used to decrease violent behavior and control your mood swings  · Blood pressure medicines: These may be used to help decrease motor tics (uncontrolled movements)  They may also help you feel calmer, more focused, and less irritable  · Anticholinergics: This medicine decreases the side effects of other medicines  · Take your medicine as directed  Contact your healthcare provider if you think your medicine is not helping or if you have side effects  Tell him or her if you are allergic to any medicine  Keep a list of the medicines, vitamins, and herbs you take  Include the amounts, and when and why you take them  Bring the list or the pill bottles to follow-up visits  Carry your medicine list with you in case of an emergency  Follow up with your healthcare provider or psychiatrist as directed: You may need to return to have your blood pressure and other symptoms checked  You may need blood tests to check the level of medicine in your blood  Write down your questions so you remember to ask them during your visits  Manage your symptoms: The following may help you feel better or prevent symptoms of schizoaffective disorder from coming back:  · Find support for yourself and your family:  Talk with others to help you cope with your illness better  This may also help to improve how you relate to others  · Keep all medical appointments: This will help manage your disease and the side-effects from medicines you may be taking  · Use your medicines as directed:  Put your medicines in a pillbox placed in an area you can easily see   Use a watch with an alarm to help you remember when it is time to take your medicine  Tell your healthcare provider if you know or think you might be pregnant  Do not stop taking your medicines without your healthcare provider's okay  A sudden stop can cause serious medical problems  · Watch for early signs of a relapse and seek help immediately:      ¨ How you think, feel, and see things has changed  ¨ You behave differently than usual     ¨ You become more nervous and upset, but do not know why  ¨ You eat less and have trouble sleeping  ¨ You have little or no interest in friends or activities  For support and more information:   · [employer]  [address] [facility], [address] , [address]  Phone: [phone]  Phone: [phone]  Web Address: "[employer] ([employer])"  · [address] [place] [address], Public Information & Communication Branch  [facility] [address] [place] [address] W, [address], [address]  [place] [address]   Phone: [phone]  Phone: [phone]  Web Address: [facility] t[facility]  Contact your healthcare provider or psychiatrist if:   · You think you are having a relapse  · You are having side effects from your medicine, or they are not helping  · You are not sleeping well or are sleeping more than usual     · You cannot eat or are eating more than usual     · You have muscle spasms, stiffness, or trouble walking  · Your sad feelings or thoughts change the way you function during the day  · You have questions or concerns about your condition or care  Seek care immediately or call 911 if:   · You feel like hurting or killing yourself or others  · You feel that your condition is getting worse  · You feel very upset, threaten someone, or you feel violent  · You suddenly have changes in your vision  · You suddenly have chest pain, trouble breathing, or a fever    © 2017 [facility] Information is for End User's use only and may not be sold, redistributed or otherwise used for commercial purposes  All illustrations and images included in CareNotes® are the copyrighted property of A D A M , Inc  or Sam aM  The above information is an  only  It is not intended as medical advice for individual conditions or treatments  Talk to your doctor, nurse or pharmacist before following any medical regimen to see if it is safe and effective for you  Perphenazine (By mouth)   Perphenazine (per-FEN-a-zeen)  Treats schizophrenia  May also be used to treat nausea and vomiting  Brand Name(s):   There may be other brand names for this medicine  When This Medicine Should Not Be Used: This medicine is not right for everyone  Do not use it if you had an allergic reaction to perphenazine, or if you have a blood disorder, a bone marrow disorder, or liver damage  How to Use This Medicine:   Tablet, Liquid  · Your doctor will tell you how much medicine to use  Do not use more than directed  · Missed dose: Take a dose as soon as you remember  If it is almost time for your next dose, wait until then and take a regular dose  Do not take extra medicine to make up for a missed dose  · Store the medicine in a closed container at room temperature, away from heat, moisture, and direct light  Drugs and Foods to Avoid:   Ask your doctor or pharmacist before using any other medicine, including over-the-counter medicines, vitamins, and herbal products  · Some medicines and foods can affect how perphenazine works  Tell your doctor if you are taking medicine to treat depression, or any medicine that makes you sleepy (such as cold or allergy medicine, sleeping pills, sedatives, muscle relaxants, tranquilizers, or narcotic pain medicine)  · Do not drink alcohol while you are using this medicine    Warnings While Using This Medicine:   · Tell your doctor if you are pregnant or breastfeeding, or if you have heart, liver, or kidney disease, asthma or emphysema, seizures, or a history of depression  · This medicine may cause the following problems:   ¨ Tardive dyskinesia, a disorder that causes involuntary movements, and that could be permanent  ¨ Neuroleptic malignant syndrome  ¨ Sensitivity to hot and cold temperatures  · This medicine may make your skin more sensitive to sunlight  Wear sunscreen  Do not use sunlamps or tanning beds  · This medicine may make you dizzy or drowsy  Do not drive or do anything else that could be dangerous until you know how this medicine affects you  Rise slowly from a sitting position if you feel dizzy  Possible Side Effects While Using This Medicine:   Call your doctor right away if you notice any of these side effects:  · Allergic reaction: Itching or hives, swelling in your face or hands, swelling or tingling in your mouth or throat, chest tightness, trouble breathing  · Fast, pounding, or uneven heartbeat, dizziness, lightheadedness  · Fever, chills, cough, sore throat, and body aches  · Fever, sweating, confusion, uneven heartbeat, muscle stiffness  · Jerky muscle movements that you cannot control (often in your face, tongue, or jaw)  · Seizures  · Severe stomach pain, constipation, vomiting  · Trouble breathing  · Unusual bleeding, bruising, or weakness  · Yellow eyes or skin  If you notice these less serious side effects, talk with your doctor:   · Blurred vision  · Drowsiness or dizziness  · Dry mouth  · Stuffy nose  If you notice other side effects that you think are caused by this medicine, tell your doctor  Call your doctor for medical advice about side effects  You may report side effects to FDA at 2-370-TGE-1929  © 2017 [facility] Information is for End User's use only and may not be sold, redistributed or otherwise used for commercial purposes  The above information is an  only  It is not intended as medical advice for individual conditions or treatments   Talk to your doctor, nurse or pharmacist before following any medical regimen to see if it is safe and effective for you  Oxcarbazepine (By mouth)   Oxcarbazepine ([id number]-ZXN-[id number]-peen)  Treats seizures  Brand Name(s): Oxtellar XR, Trileptal   There may be other brand names for this medicine  When This Medicine Should Not Be Used: This medicine is not right for everyone  Do not use it if you had an allergic reaction to oxcarbazepine  How to Use This Medicine:   Liquid, Tablet, Long Acting Tablet  · Take your medicine as directed  Your dose may need to be changed several times to find what works best for you  · This medicine should come with a Medication Guide  Ask your pharmacist for a copy if you do not have one  · Swallow the extended-release tablet whole  Do not crush, break, or chew it  · Oral liquid: Measure the oral liquid medicine with a marked measuring spoon, oral syringe, or medicine cup  Shake well just before you measure a dose  Swallow the medicine directly, or mix it in a glass with a small amount of water and drink all of the mixture right away  · Food:   ¨ Take the extended-release tablet on an empty stomach at least 1 hour before or 2 hours after a meal   ¨ You may take the oral liquid or regular tablet with or without food  · Missed dose: Take a dose as soon as you remember  If it is almost time for your next dose, wait until then and take a regular dose  Do not take extra medicine to make up for a missed dose  · Store the medicine in a closed container at room temperature, away from heat, moisture, and direct light  Store the oral liquid in the original container and use within 7 weeks after you first open the bottle  Throw away any unused liquid  Drugs and Foods to Avoid:   Ask your doctor or pharmacist before using any other medicine, including over-the-counter medicines, vitamins, and herbal products  · Some medicines and foods can affect how this drug works   Tell your doctor if you are also using any of the following:  ¨ Calcium channel blocker medicine, including felodipine or verapamil  ¨ Other medicine to control seizures  · Tell your doctor if you use anything else that makes you sleepy  Some examples are allergy medicine, narcotic pain medicine, and alcohol  Warnings While Using This Medicine:   · Tell your doctor if you are pregnant or breastfeeding, or if you have kidney disease or liver disease  Also tell your doctor if you are allergic to carbamazepine or tartrazine  · This medicine may cause the following problems:  ¨ Drug reaction with eosinophilia and systemic symptoms (DRESS), which may damage organs such as the liver, kidney, or heart  ¨ Low sodium levels in the blood  ¨ Serious skin or allergic reactions  ¨ Decreased levels of blood cells, which may cause bleeding problems or increase your risk for infection  · This medicine may cause depression, thoughts of suicide, or changes in mood or behavior  Tell your doctor if you have a history of depression or mental health problems  · Do not stop using this medicine suddenly  Your doctor will need to slowly decrease your dose before you stop it completely  The seizures might become more frequent if you suddenly stop using this medicine  · Birth control that uses hormones may not work as well while you are using this medicine  Use a different type of birth control if you need to  Talk with your doctor if you have questions  · This medicine may make you dizzy or drowsy  Do not drive or do anything else that could be dangerous until you know how this medicine affects you  · Your doctor will do lab tests at regular visits to check on the effects of this medicine  Keep all appointments  · Keep all medicine out of the reach of children  Never share your medicine with anyone    Possible Side Effects While Using This Medicine:   Call your doctor right away if you notice any of these side effects:  · Allergic reaction: Itching or hives, swelling in your face or hands, swelling or tingling in your mouth or throat, chest tightness, trouble breathing  · Blistering, peeling, red skin rash  · Confusion, weakness, muscle twitching  · Fever, skin rash, or swollen glands in your armpits, neck, or groin  · Unusual thoughts or behaviors, thoughts of hurting yourself, or feeling depressed, irritable, nervous, restless  · Unusual bleeding, bruising, or weakness  If you notice these less serious side effects, talk with your doctor:   · Dizziness, headache  · Nausea, vomiting, stomach pain  · Trouble concentrating or speaking, tiredness, clumsiness, trouble walking  · Vision changes, double vision  If you notice other side effects that you think are caused by this medicine, tell your doctor  Call your doctor for medical advice about side effects  You may report side effects to FDA at 6-056-FDA-3571  © 2017 [address] Information is for End User's use only and may not be sold, redistributed or otherwise used for commercial purposes  The above information is an  only  It is not intended as medical advice for individual conditions or treatments  Talk to your doctor, nurse or pharmacist before following any medical regimen to see if it is safe and effective for you

## 2018-08-08 NOTE — SOCIAL WORK
CASTRO received call from pt's friend David who informed SW that he was at home yesterday and brother fixed roof and water  He informed CASTRO that pt can stay with him for a few days while they clean up home and that he will assist pt with cleaning up home  CASTRO informed David again that pt should not stay in home if it is not safe or livable and David again stated that pt can stay with him if home is not safe and livable  CASTRO informed David that pt can discharge as we are trusting his word that he will provide safe discharge environment  David informed CASTRO that he will pick pt up around 4:15PM   David also informed CASTRO that he got pt a job

## 2018-08-08 NOTE — DISCHARGE SUMMARY
Discharge Summary - [address] 33 y o  male MRN: [id number]  Unit/Bed#: [facility] 477-79 Encounter: [id number]     Admission Date:   Admission Orders     Ordered        06/22/18 1140  Admit Patient to [address] [place] [address], Rehab, Skilled Nursing)  Once                   Discharge Date: 8-8-2018    Attending Psychiatrist: Taras Oppenheim, MD    Reason for Admission/HPI:  This is a discharge summary on patient  Aline Hunt is a 73-year-old male who lives in [address] [place] [address]  He has a history of substance abuse which is quite extensive  He was recently released from [facility] where he served time for drug-related offenses  In the past 2 weeks reports increased feelings that someone is out to harm him in some way  He has used K2, bath salts and crystal meth in the past   Most recently, he was using crystal meth  He was also smoking marijuana  During the session, he was laughing inappropriately and stated he was not eating or sleeping well  He was brought to the emergency room by a friend because of his behavior  He was hearing voices telling him to get rid of  people in my ceiling  Given the patient's history of substance abuse, and his current level psychosis on admission, it was necessary for him to come in the hospital for treatment  History of Present Illness   Patient is a 33 y o  male presents with Signs of acute psychosis  Patient was admitted to psychiatric unit on a voluntarily 201 commitment basis  Primary complaints include: hearing voices  Onset of symptoms was gradual starting a few days ago with rapidly worsening course since that time  Psychosocial Stressors: family, health, drug and alcohol and social     Hospital Course:   Hospital course has been extensive  During his hospital stay, the patient was trialed on multiple antipsychotic medications  We had trial Seroquel, Zyprexa, Risperdal, Haldol, Thorazine and finally perphenazine    Out of all these antipsychotics, the patient responded the best to perphenazine  It decreased his agitation, and help to calm the voices  Unfortunately, it did not get rid of the voices  He still is experiencing chronic auditory hallucinations but they are not of a command nature and they are not troubling to him  He was also placed on the mood stabilizer, Trileptal, because of his level of agitation at times and when he was agitated, he was trying to get out of the unit  Since being on the combination of Trileptal and perphenazine, he has been calmer, more cooperative, out of his room and attending groups at times  He was having difficulty with placement post discharge and finally now tells us that his friend will be able to pick hi up  hopefully today after 3:30PM   On day of discharge, the patient is denying any suicidal homicidal ideation  He is still experiencing auditory hallucinations, but as was noted earlier in this discharge summary, these are chronic and persistent  Again they are not of a command nature and he will be ready to be discharged today  He is discharged on the following medications, Cogentin 1 mg b i d , Benadryl 25 mg b i d , perphenazine 12 mg at noon and 16 mg b i d , Trileptal 300 mg t i d   All medications were ordered for 1 month supply and sent to the patient's pharmacy today  Patient's labs were reviewed daily and all labs are well within normal limits  The patient will follow up with Dr Alda Solis, psychiatry,on 0-  Mental Status at time of Discharge:     Appearance:  casually dressed   Behavior:  normal   Speech:  soft   Mood:  euthymic   Affect:  constricted   Thought Process:  disorganized   Thought Content:  normal   Perceptual Disturbances:  Auditory hallucinations without commands   Risk Potential: none   Sensorium:  person, place and time/date   Cognition:  impaired due to illness   Consciousness:  alert and awake    Attention: attention span appeared shorter than expected for age   Insight:  limited   Judgment: limited   Gait/Station: normal gait/station   Motor Activity: no abnormal movements       Discharge Diagnosis:   Psychosis NOS      Resolved Problems  Date Reviewed: 8/8/2018    None              Discharge Medications:  See after visit summary for reconciled discharge medications provided to patient and family  Discharge instructions/Information to patient and family:   See after visit summary for information provided to patient and family  Provisions for Follow-Up Care:  See after visit summary for information related to follow-up care and any pertinent home health orders  Discharge Statement   I spent 30 minutes discharging the patient  This time was spent on the day of discharge  I had direct contact with the patient on the day of discharge  Additional documentation is required if more than 30 minutes were spent on discharge

## 2018-08-08 NOTE — PROGRESS NOTES
Progress Note - [facility] 33 y o  male MRN: [id number]   Unit/Bed#: U 220-02 Encounter: [id number]    Behavior over the last 24 hours: improved  Radha Tapia is doing better  He still has auditory hallucinations without commands, but states hallucinations are "easy to ignore"  His thoughts seem more organized, he denies feeling depressed  Compliant with medications  Sleep: normal  Appetite: normal  Medication side effects: No   ROS: no complaints, denies any shortness of breath, chest pain or abdominal pain    Mental Status Evaluation:    Appearance:  casually dressed   Behavior:  cooperative   Speech:  soft   Mood:  euthymic   Affect:  flat   Thought Process:  concrete   Associations: concrete associations   Thought Content:  no overt delusions   Perceptual Disturbances: vague auditory hallucinations, chronic, no commands, no visual hallucinations   Risk Potential: Suicidal ideation - None  Homicidal ideation - None  Potential for aggression - No   Sensorium:  oriented to person, place and time/date   Memory:  recent and remote memory grossly intact   Consciousness:  alert and awake   Attention: attention span and concentration appear shorter than expected for age   Insight:  limited   Judgment: limited   Gait/Station: normal gait/station and normal balance   Motor Activity: no abnormal movements     Vital signs in last 24 hours:    Temp:  [98 2 °F (36 8 °C)-98 6 °F (37 °C)] 98 6 °F (37 °C)  HR:  [] 97  Resp:  [18] 18  BP: (110-118)/(54-69) 118/69    Laboratory results:  I have personally reviewed all pertinent laboratory/tests results      Most Recent Labs:   Lab Results   Component Value Date    WBC 7 80 07/20/2018    RBC 5 03 07/20/2018    HGB 15 7 07/20/2018    HCT 44 6 07/20/2018     07/20/2018    RDW 12 8 07/20/2018    NEUTROABS 5 20 07/20/2018     07/20/2018    K 4 1 07/20/2018     07/20/2018    CO2 31 07/20/2018    BUN 21 07/20/2018    CREATININE 1 06 07/20/2018    GLUCOSE 89 07/20/2018    CALCIUM 9 9 07/20/2018    AST 12 (L) 07/20/2018    ALT 15 07/20/2018    ALKPHOS 59 07/20/2018    PROT 7 1 07/20/2018    BILITOT 0 30 07/20/2018    NIH0HVDAHLBS 36 55 (H) 06/18/2018       Progress Toward Goals: improved, discharge planning    Assessment/Plan   Principal Problem:    Schizoaffective disorder, bipolar type ([facility] [address] )  Active Problems:    Amphetamine abuse    Recommended Treatment:     Planned medication and treatment changes: All current active medications have been reviewed  Encourage group therapy, milieu therapy and occupational therapy  Behavioral Health checks every 5 minutes  Discharge planned for tomorrow as arranged by  with his friend  He will stay with friend after discharge until his apartment becomes livable    Continue current medications:    Current Facility-Administered Medications:  acetaminophen 650 mg Oral Q4H PRN Provider Cutover   aluminum-magnesium hydroxide-simethicone 30 mL Oral Q4H PRN Provider Cutover   benztropine 1 mg Oral BID Provider Cutover   chlorproMAZINE 25 mg Intramuscular Q6H PRN Provider Cutover   chlorproMAZINE 25 mg Oral Q6H PRN Provider Cutover   diphenhydrAMINE 50 mg Intramuscular Q4H PRN Provider Cutover   diphenhydrAMINE 25 mg Oral BID BRIDGET Kuhn   diphenhydrAMINE 50 mg Oral Q4H PRN Provider Cutover   levothyroxine 50 mcg Oral Early Morning Provider Cutover   magnesium hydroxide 30 mL Oral Daily PRN Provider Cutover   nicotine 21 mg Transdermal Daily Provider Cutover   OXcarbazepine 450 mg Oral Q12H [facility] BRIDGET Kuhn   perphenazine 12 mg Oral Daily Renell Clos III, DO   perphenazine 16 mg Oral QAM Kenny Joy MD   perphenazine 16 mg Oral After Anya Pabon MD   traZODone 50 mg Oral HS PRN Provider Cutover       Risks / Benefits of Treatment:    Risks, benefits, and possible side effects of medications explained to patient   Patient has limited understanding of risks and benefits of treatment at this time, but agrees to take medications as prescribed  Counseling / Coordination of Care:    Patient's progress discussed with staff in treatment team meeting  Medications, treatment progress and treatment plan reviewed with patient

## 2018-08-08 NOTE — PROGRESS NOTES
Pt has been withdrawn to room  Visible for meals and meds  Pt still has auditory hallucinations, but they are under control  Pt denies SI, HI  Pleasant and cooperative  No group participation  Pt is for possible DC today, pending on place of DC  Monitoring continues

## 2018-08-08 NOTE — PLAN OF CARE
PSYCHOSIS     Will report no hallucinations or delusions Adequate for Discharge          Ineffective Coping     Participates in unit activities Completed

## 2018-08-09 ENCOUNTER — HOSPITAL ENCOUNTER (EMERGENCY)
Facility: HOSPITAL | Age: 33
End: 2018-08-10
Attending: EMERGENCY MEDICINE
Payer: COMMERCIAL

## 2018-08-09 DIAGNOSIS — F32.A DEPRESSION: Primary | ICD-10-CM

## 2018-08-09 LAB
ALBUMIN SERPL BCP-MCNC: 4.5 G/DL (ref 3.5–5.7)
ALP SERPL-CCNC: 65 U/L (ref 40–150)
ALT SERPL W P-5'-P-CCNC: 21 U/L (ref 7–52)
AMPHETAMINES SERPL QL SCN: NEGATIVE
ANION GAP SERPL CALCULATED.3IONS-SCNC: 11 MMOL/L (ref 4–13)
AST SERPL W P-5'-P-CCNC: 20 U/L (ref 13–39)
BARBITURATES UR QL: NEGATIVE
BASOPHILS # BLD AUTO: 0 THOUSANDS/ΜL (ref 0–0.1)
BASOPHILS NFR BLD AUTO: 0 % (ref 0–2)
BENZODIAZ UR QL: NEGATIVE
BILIRUB SERPL-MCNC: 0.4 MG/DL (ref 0.2–1)
BUN SERPL-MCNC: 19 MG/DL (ref 7–25)
CALCIUM SERPL-MCNC: 9.8 MG/DL (ref 8.6–10.5)
CHLORIDE SERPL-SCNC: 103 MMOL/L (ref 98–107)
CO2 SERPL-SCNC: 23 MMOL/L (ref 21–31)
COCAINE UR QL: NEGATIVE
CREAT SERPL-MCNC: 0.89 MG/DL (ref 0.7–1.3)
EOSINOPHIL # BLD AUTO: 0.1 THOUSAND/ΜL (ref 0–0.61)
EOSINOPHIL NFR BLD AUTO: 2 % (ref 0–5)
ERYTHROCYTE [DISTWIDTH] IN BLOOD BY AUTOMATED COUNT: 12.7 % (ref 11.5–14.5)
ETHANOL SERPL-MCNC: <10 MG/DL
GFR SERPL CREATININE-BSD FRML MDRD: 114 ML/MIN/1.73SQ M
GLUCOSE SERPL-MCNC: 156 MG/DL (ref 65–99)
HCT VFR BLD AUTO: 41.7 % (ref 36.5–49.3)
HGB BLD-MCNC: 14.6 G/DL (ref 14–18)
LYMPHOCYTES # BLD AUTO: 1.2 THOUSANDS/ΜL (ref 0.6–4.47)
LYMPHOCYTES NFR BLD AUTO: 18 % (ref 21–51)
MCH RBC QN AUTO: 30.8 PG (ref 26–34)
MCHC RBC AUTO-ENTMCNC: 34.9 G/DL (ref 31–37)
MCV RBC AUTO: 88 FL (ref 81–99)
METHADONE UR QL: NEGATIVE
MONOCYTES # BLD AUTO: 0.6 THOUSAND/ΜL (ref 0.17–1.22)
MONOCYTES NFR BLD AUTO: 8 % (ref 2–12)
NEUTROPHILS # BLD AUTO: 5 THOUSANDS/ΜL (ref 1.4–6.5)
NEUTS SEG NFR BLD AUTO: 72 % (ref 42–75)
NRBC BLD AUTO-RTO: 0 /100 WBCS
OPIATES UR QL SCN: NEGATIVE
PCP UR QL: NEGATIVE
PLATELET # BLD AUTO: 191 THOUSANDS/UL (ref 149–390)
PMV BLD AUTO: 8.3 FL (ref 8.6–11.7)
POTASSIUM SERPL-SCNC: 3.6 MMOL/L (ref 3.5–5.5)
PROT SERPL-MCNC: 7 G/DL (ref 6.4–8.9)
RBC # BLD AUTO: 4.74 MILLION/UL (ref 4.3–5.9)
SODIUM SERPL-SCNC: 137 MMOL/L (ref 134–143)
THC UR QL: NEGATIVE
WBC # BLD AUTO: 7 THOUSAND/UL (ref 4.8–10.8)

## 2018-08-09 PROCEDURE — 80053 COMPREHEN METABOLIC PANEL: CPT | Performed by: EMERGENCY MEDICINE

## 2018-08-09 PROCEDURE — 80307 DRUG TEST PRSMV CHEM ANLYZR: CPT | Performed by: EMERGENCY MEDICINE

## 2018-08-09 PROCEDURE — 85025 COMPLETE CBC W/AUTO DIFF WBC: CPT | Performed by: EMERGENCY MEDICINE

## 2018-08-09 PROCEDURE — 80320 DRUG SCREEN QUANTALCOHOLS: CPT | Performed by: EMERGENCY MEDICINE

## 2018-08-09 PROCEDURE — 36415 COLL VENOUS BLD VENIPUNCTURE: CPT | Performed by: EMERGENCY MEDICINE

## 2018-08-09 NOTE — LETTER
[address] [facility] [address] [id number]  [phone]  Dept: [phone]      EMTALA TRANSFER CONSENT    NAME Sriram CONTRERAS 1985                              MRN [id number]    I have been informed of my rights regarding examination, treatment, and transfer   by Dr Perez att  providers found    Benefits: Specialized equipment and/or services available at the receiving facility (Include comment)________________________ (mental health treatment and stabilization)    Risks: Potential for delay in receiving treatment, Increased discomfort during transfer      Consent for Transfer:  I acknowledge that my medical condition has been evaluated and explained to me by the emergency department physician or other qualified medical person and/or my attending physician, who has recommended that I be transferred to the service of  Accepting Physician: Dr Crystal Fontana at [address] [facility] Name, [address] : The  [facility] [place], [place]  The above potential benefits of such transfer, the potential risks associated with such transfer, and the probable risks of not being transferred have been explained to me, and I fully understand them  The doctor has explained that, in my case, the benefits of transfer outweigh the risks  I agree to be transferred  I authorize the performance of emergency medical procedures and treatments upon me in both transit and upon arrival at the receiving facility  Additionally, I authorize the release of any and all medical records to the receiving facility and request they be transported with me, if possible  I understand that the safest mode of transportation during a medical emergency is an ambulance and that the Hospital advocates the use of this mode of transport   Risks of traveling to the receiving facility by car, including absence of medical control, life sustaining equipment, such as oxygen, and medical personnel has been explained to me and I fully understand them  (HOLGER CORRECT BOX BELOW)  [  ]  I consent to the stated transfer and to be transported by ambulance/helicopter  [  ]  I consent to the stated transfer, but refuse transportation by ambulance and accept full responsibility for my transportation by car  I understand the risks of non-ambulance transfers and I exonerate the Hospital and its staff from any deterioration in my condition that results from this refusal     X___________________________________________    DATE  08/10/18  TIME________  Signature of patient or legally responsible individual signing on patient behalf           RELATIONSHIP TO PATIENT_________________________          Provider Certification    NAME Meir López                                         1985                              MRN [id number]    A medical screening exam was performed on the above named patient  Based on the examination:    Condition Necessitating Transfer There were no encounter diagnoses      Patient Condition: The patient has been stabilized such that within reasonable medical probability, no material deterioration of the patient condition or the condition of the unborn child(julia) is likely to result from the transfer    Reason for Transfer: Level of Care needed not available at this facility    Transfer Requirements: THEO Nicolas [address]   · Space available and qualified personnel available for treatment as acknowledged by Darion Pa, [address] [place] [address]  or [phone]  · Agreed to accept transfer and to provide appropriate medical treatment as acknowledged by       Dr Mike Trejo  · Appropriate medical records of the examination and treatment of the patient are provided at the time of transfer   [address], [address] _______  · Transfer will be performed by qualified personnel from [facility] [phone]  and appropriate transfer equipment as required, including the use of necessary and appropriate life support measures  Provider Certification: I have examined the patient and explained the following risks and benefits of being transferred/refusing transfer to the patient/family:  General risk, such as traffic hazards, adverse weather conditions, rough terrain or turbulence, possible failure of equipment (including vehicle or aircraft), or consequences of actions of persons outside the control of the transport personnel, The patient is stable for psychiatric transfer because they are medically stable, and is protected from harming him/herself or others during transport      Based on these reasonable risks and benefits to the patient and/or the unborn child(julia), and based upon the information available at the time of the patients examination, I certify that the medical benefits reasonably to be expected from the provision of appropriate medical treatments at another medical facility outweigh the increasing risks, if any, to the individuals medical condition, and in the case of labor to the unborn child, from effecting the transfer      X____________________________________________ DATE 08/10/18        TIME_______      ORIGINAL - SEND TO MEDICAL RECORDS   COPY - SEND WITH PATIENT DURING TRANSFER

## 2018-08-09 NOTE — CASE MANAGEMENT
I was informed by nursing staff that when this patient went to  his medications that they were too expensive  I called the number left by the patient this morning to assist him and left a message  I left my number so he could call me back

## 2018-08-09 NOTE — ED NOTES
[facility] [phone] sister-in-law     Angélica Nazia  08/09/18 Lynda Lui  08/09/18 1940

## 2018-08-09 NOTE — ED PROVIDER NOTES
History  Chief Complaint   Patient presents with    Psychiatric Evaluation     suicidal without plan     33 YOM WITH A HX OF A SCHIZOAFFECTIVE DISORDER NOW WITH AUDITORY HALLUCINATIONS NOW WITH ESCALATING COMMANDS  TO KILL HIMSELF  THE PATIENT HAD BEEN HOSPITALIZED FOR 2 MONTHS, RELEASED YESTERDAY, AND PRESENTING TODAY AS DESCRIBED NO MEDICAL COMPLAINT  Prior to Admission Medications   Prescriptions Last Dose Informant Patient Reported? Taking? OXcarbazepine (TRILEPTAL) 300 mg tablet 8/9/2018 at Unknown time  No Yes   Sig: Take 1 tablet (300 mg total) by mouth 3 (three) times a day   benztropine (COGENTIN) 1 mg tablet 8/9/2018 at Unknown time  No Yes   Sig: Take 1 tablet (1 mg total) by mouth 2 (two) times a day   diphenhydrAMINE (BENADRYL) 25 mg tablet Unknown at Unknown time  No No   Sig: Take 1 tablet (25 mg total) by mouth 2 (two) times a day   levothyroxine 50 mcg tablet 8/9/2018 at Unknown time  No Yes   Sig: Take 1 tablet (50 mcg total) by mouth daily in the early morning   perphenazine 4 mg tablet 8/9/2018 at Unknown time  No Yes   Sig: Take 3 at 1:00PM   Patient taking differently: Take 4 mg by mouth daily At 1PM       Facility-Administered Medications: None       Past Medical History:   Diagnosis Date    Anxiety     Bipolar disorder ([facility] )     Depression     Disease of thyroid gland     Drug abuse     ETOH abuse     Hallucination     Psychiatric illness     Psychosis     Schizoaffective disorder, bipolar type ([facility] ) 6/30/2018    Schizophrenia ([facility] )        History reviewed  No pertinent surgical history  History reviewed  No pertinent family history  I have reviewed and agree with the history as documented      Social History   Substance Use Topics    Smoking status: Current Every Day Smoker     Packs/day: 1 00     Types: Cigarettes    Smokeless tobacco: Former User    Alcohol use Yes      Comment: not in past 5 months per pt        Review of Systems   Constitutional: Negative for chills and fever  HENT: Negative for ear pain, rhinorrhea and sore throat  Eyes: Negative for pain, redness and visual disturbance  Respiratory: Negative for cough and shortness of breath  Cardiovascular: Negative for chest pain and leg swelling  Gastrointestinal: Negative for abdominal pain, diarrhea, nausea and vomiting  Genitourinary: Negative for dysuria, flank pain, frequency and urgency  Musculoskeletal: Negative for back pain, myalgias and neck pain  Skin: Positive for rash  Neurological: Negative for dizziness, weakness, light-headedness and headaches  Hematological: Negative  Psychiatric/Behavioral: Positive for behavioral problems, confusion, decreased concentration and hallucinations  Negative for agitation and suicidal ideas  The patient is nervous/anxious  All other systems reviewed and are negative  Physical Exam  Physical Exam   Constitutional: He is oriented to person, place, and time  He appears well-developed and well-nourished  HENT:   Nose: Nose normal    Mouth/Throat: Oropharynx is clear and moist  No oropharyngeal exudate  Eyes: Conjunctivae and EOM are normal  Pupils are equal, round, and reactive to light  No scleral icterus  Neck: Normal range of motion  Neck supple  No JVD present  No tracheal deviation present  Cardiovascular: Normal rate, regular rhythm and normal heart sounds  No murmur heard  Pulmonary/Chest: Effort normal and breath sounds normal  No respiratory distress  He has no wheezes  He has no rales  Abdominal: Soft  Bowel sounds are normal  There is no tenderness  There is no guarding  Musculoskeletal: Normal range of motion  He exhibits no edema or tenderness  Neurological: He is alert and oriented to person, place, and time  No cranial nerve deficit or sensory deficit  He exhibits normal muscle tone     5/5 motor, nl sens   Skin: Rash (DRY SCALY,FLAT IRREGULAR ERYTHEMATOUS PATCHES TO FACE AND FOREHEAD) noted  Psychiatric:   DEPRESSED WITH DELIBERATE SPEECH AND SCANTY THOUGHTS  INSIGHT AND JUDGEMENT COMPROMISED  AFFECT FLAT   Nursing note and vitals reviewed  Vital Signs  ED Triage Vitals [08/09/18 1703]   Temperature Pulse Respirations Blood Pressure SpO2   99 2 °F (37 3 °C) 104 18 138/76 95 %      Temp Source Heart Rate Source Patient Position - Orthostatic VS BP Location FiO2 (%)   Temporal Monitor Sitting Left arm --      Pain Score       No Pain           Vitals:    08/09/18 1703   BP: 138/76   Pulse: 104   Patient Position - Orthostatic VS: Sitting       Visual Acuity      ED Medications  Medications - No data to display    Diagnostic Studies  Results Reviewed     Procedure Component Value Units Date/Time    Ethanol [[id number]]  (Normal) Collected:  08/09/18 1836    Lab Status:  Final result Specimen:  Blood from Arm, Left Updated:  08/09/18 1922     Ethanol Lvl <10 mg/dL     Rapid drug screen, urine [[id number]]  (Normal) Collected:  08/09/18 1833    Lab Status:  Final result Specimen:  Urine from Urine, Clean Catch Updated:  08/09/18 1921     Amph/Meth UR Negative     Barbiturate Ur Negative     Benzodiazepine Urine Negative     Cocaine Urine Negative     Methadone Urine Negative     Opiate Urine Negative     PCP Ur Negative     THC Urine Negative    Narrative:         FOR MEDICAL PURPOSES ONLY  IF CONFIRMATION NEEDED PLEASE CONTACT THE LAB WITHIN 5 DAYS      Drug Screen Cutoff Levels:  AMPHETAMINE/METHAMPHETAMINES  1000 ng/mL  BARBITURATES     200 ng/mL  BENZODIAZEPINES     200 ng/mL  COCAINE      300 ng/mL  METHADONE      300 ng/mL  OPIATES      300 ng/mL  PHENCYCLIDINE     25 ng/mL  THC       50 ng/mL    Comprehensive metabolic panel [[id number]]  (Abnormal) Collected:  08/09/18 1836    Lab Status:  Final result Specimen:  Blood from Arm, Left Updated:  08/09/18 1921     Sodium 137 mmol/L      Potassium 3 6 mmol/L      Chloride 103 mmol/L      CO2 23 mmol/L      Anion Gap 11 mmol/L      BUN 19 mg/dL      Creatinine 0 89 mg/dL      Glucose 156 (H) mg/dL      Calcium 9 8 mg/dL      AST 20 U/L      ALT 21 U/L      Alkaline Phosphatase 65 U/L      Total Protein 7 0 g/dL      Albumin 4 5 g/dL      Total Bilirubin 0 40 mg/dL      eGFR 114 ml/min/1 73sq m     Narrative:         National Kidney Disease Education Program recommendations are as follows:  GFR calculation is accurate only with a steady state creatinine  Chronic Kidney disease less than 60 ml/min/1 73 sq  meters  Kidney failure less than 15 ml/min/1 73 sq  meters  CBC and differential [[id number]]  (Abnormal) Collected:  08/09/18 1836    Lab Status:  Final result Specimen:  Blood from Arm, Left Updated:  08/09/18 1846     WBC 7 00 Thousand/uL      RBC 4 74 Million/uL      Hemoglobin 14 6 g/dL      Hematocrit 41 7 %      MCV 88 fL      MCH 30 8 pg      MCHC 34 9 g/dL      RDW 12 7 %      MPV 8 3 (L) fL      Platelets 587 Thousands/uL      nRBC 0 /100 WBCs      Neutrophils Relative 72 %      Lymphocytes Relative 18 (L) %      Monocytes Relative 8 %      Eosinophils Relative 2 %      Basophils Relative 0 %      Neutrophils Absolute 5 00 Thousands/µL      Lymphocytes Absolute 1 20 Thousands/µL      Monocytes Absolute 0 60 Thousand/µL      Eosinophils Absolute 0 10 Thousand/µL      Basophils Absolute 0 00 Thousands/µL                  No orders to display              Procedures  Procedures       Phone Contacts  ED Phone Contact    ED Course    AT 7:30PM, THE PATIENT'S BLOOD WORK RETURNED UNREMARKABLE  THE PATIENT IS MEDICALLY C;EAR FOR CRISIS EVAL                               MDM  CritCare Time    Disposition  Final diagnoses:   None     ED Disposition     None      Follow-up Information    None         Patient's Medications   Discharge Prescriptions    No medications on file     No discharge procedures on file      ED Provider  Electronically Signed by           Joanne Goldman MD  08/09/18 0833

## 2018-08-10 VITALS
WEIGHT: 194 LBS | HEIGHT: 75 IN | DIASTOLIC BLOOD PRESSURE: 70 MMHG | BODY MASS INDEX: 24.12 KG/M2 | OXYGEN SATURATION: 96 % | RESPIRATION RATE: 20 BRPM | HEART RATE: 95 BPM | TEMPERATURE: 98.4 F | SYSTOLIC BLOOD PRESSURE: 124 MMHG

## 2018-08-10 PROCEDURE — 99285 EMERGENCY DEPT VISIT HI MDM: CPT

## 2018-08-10 NOTE — ED NOTES
Patient was updated on acceptance to the [facility] and tentative 11:30 a m  Pick-up / transport time  He voiced understanding

## 2018-08-10 NOTE — ED NOTES
No beds at [address] [facility] according to [facility] in admissions but advised Crisis to fax referral after 2230 tonight to review for possible bed tomorrow    Phone:  [phone]  Fax:  [phone]

## 2018-08-10 NOTE — ED NOTES
Bed search continues  [facility] has no beds currently available  [facility] has no beds at this time

## 2018-08-10 NOTE — ED NOTES
Crisis worker called Jacob Estrada at the [facility] to provide precert and transportation information

## 2018-08-10 NOTE — ED NOTES
Spoke to Mary at [facility] who approved 5 days  Days of coverage and auth# will be given after a facility accepted

## 2018-08-10 NOTE — ED NOTES
Insurance Authorization:   Phone call placed to [facility]  Phone number: [phone]  Spoke to [place]      5 days approved  Level of care: inpatient psychiatric  Review on 8/14/18  Authorization # [id number]

## 2018-08-10 NOTE — ED NOTES
Patient was updated on acceptance to the [facility] and tentative 11:30 a m  Pick-up / transport time  He voiced understanding

## 2018-08-10 NOTE — ED NOTES
[facility] answer from [facility] [facility] to the answering service for [place] and waiting for a call back    Left a message for [facility]

## 2018-08-10 NOTE — ED NOTES
Patient is resting comfortably  Sleeping in intervals  Cooperative with staff         Matthew Cooper RN  08/10/18 0089
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
## 2018-08-10 NOTE — ED NOTES
Bed search continues  No availability at [address] does not have age appropriate beds  [facility] are out of service  [facility] has no psychiatric inpatient services  [facility] has no current availability  The [facility] agreed to accept a referral and a packet was faxed for review  [facility] agreed to accept a faxed referral and a packet was sent to them as well

## 2018-08-10 NOTE — ED NOTES
As per Shabbir Wong at [facility] faxed clinical for review for possible admit    Phone:  [phone]  Fax:  [phone]

## 2018-09-04 DIAGNOSIS — F25.0 SCHIZOAFFECTIVE DISORDER, BIPOLAR TYPE (HCC): Chronic | ICD-10-CM

## 2018-09-04 RX ORDER — OXCARBAZEPINE 300 MG/1
TABLET, FILM COATED ORAL
Qty: 90 TABLET | Refills: 0 | OUTPATIENT
Start: 2018-09-04

## 2020-02-28 NOTE — PROGRESS NOTES
Findings and plan of care discussed at bedside by provider. Pt and granddaughter verbalized understanding of plan of care, pt discharged with all instructions reviewed and any prescriptions as applicable. All belongings in hand including discharge instructions, prescriptions, and personal belongings. Pt in no acute distress.      Gerardo Butt RN  02/28/20 5647 Patient observed sleeping during most Q15 minute safety checks (second portion of shift)  No suicidal ideations, acting out or homicidal behaviors  No change in medical condition or complaints voiced  Fluids maintained at bedside to promote hydration

## 2021-12-03 ENCOUNTER — OFFICE VISIT (OUTPATIENT)
Dept: FAMILY MEDICINE CLINIC | Facility: CLINIC | Age: 36
End: 2021-12-03
Payer: COMMERCIAL

## 2021-12-03 VITALS
TEMPERATURE: 97.5 F | WEIGHT: 213 LBS | DIASTOLIC BLOOD PRESSURE: 86 MMHG | SYSTOLIC BLOOD PRESSURE: 122 MMHG | HEIGHT: 75 IN | BODY MASS INDEX: 26.49 KG/M2 | OXYGEN SATURATION: 96 % | HEART RATE: 94 BPM

## 2021-12-03 DIAGNOSIS — L70.9 ACNE, UNSPECIFIED ACNE TYPE: ICD-10-CM

## 2021-12-03 DIAGNOSIS — E03.9 HYPOTHYROIDISM, UNSPECIFIED TYPE: ICD-10-CM

## 2021-12-03 DIAGNOSIS — Z13.1 SCREENING FOR DIABETES MELLITUS: ICD-10-CM

## 2021-12-03 DIAGNOSIS — Z13.220 NEED FOR LIPID SCREENING: ICD-10-CM

## 2021-12-03 DIAGNOSIS — F20.9 SCHIZOPHRENIA, UNSPECIFIED TYPE (HCC): Primary | ICD-10-CM

## 2021-12-03 PROCEDURE — 1036F TOBACCO NON-USER: CPT | Performed by: FAMILY MEDICINE

## 2021-12-03 PROCEDURE — 99203 OFFICE O/P NEW LOW 30 MIN: CPT | Performed by: FAMILY MEDICINE

## 2021-12-03 PROCEDURE — 3725F SCREEN DEPRESSION PERFORMED: CPT | Performed by: FAMILY MEDICINE

## 2021-12-03 PROCEDURE — 3008F BODY MASS INDEX DOCD: CPT | Performed by: FAMILY MEDICINE

## 2021-12-03 RX ORDER — RISPERIDONE 3 MG/1
3 TABLET, FILM COATED ORAL 2 TIMES DAILY
COMMUNITY

## 2021-12-03 RX ORDER — LEVOTHYROXINE SODIUM 175 UG/1
175 TABLET ORAL DAILY
COMMUNITY
End: 2021-12-03 | Stop reason: SDUPTHER

## 2021-12-03 RX ORDER — OXCARBAZEPINE 600 MG/1
600 TABLET, FILM COATED ORAL EVERY 12 HOURS SCHEDULED
COMMUNITY

## 2021-12-03 RX ORDER — BENZTROPINE MESYLATE 1 MG/1
1 TABLET ORAL 2 TIMES DAILY
COMMUNITY
End: 2022-06-29

## 2021-12-03 RX ORDER — LEVOTHYROXINE SODIUM 175 UG/1
175 TABLET ORAL DAILY
Qty: 90 TABLET | Refills: 3 | Status: SHIPPED | OUTPATIENT
Start: 2021-12-03 | End: 2022-03-03

## 2022-01-20 ENCOUNTER — APPOINTMENT (OUTPATIENT)
Dept: RADIOLOGY | Facility: CLINIC | Age: 37
End: 2022-01-20
Payer: COMMERCIAL

## 2022-01-20 ENCOUNTER — OFFICE VISIT (OUTPATIENT)
Dept: URGENT CARE | Facility: CLINIC | Age: 37
End: 2022-01-20
Payer: COMMERCIAL

## 2022-01-20 VITALS
RESPIRATION RATE: 18 BRPM | HEART RATE: 103 BPM | OXYGEN SATURATION: 96 % | BODY MASS INDEX: 26.11 KG/M2 | WEIGHT: 210 LBS | TEMPERATURE: 98.9 F | HEIGHT: 75 IN

## 2022-01-20 DIAGNOSIS — S93.402A SPRAIN OF LEFT ANKLE, UNSPECIFIED LIGAMENT, INITIAL ENCOUNTER: Primary | ICD-10-CM

## 2022-01-20 DIAGNOSIS — S99.912A ANKLE INJURY, LEFT, INITIAL ENCOUNTER: ICD-10-CM

## 2022-01-20 PROCEDURE — 73610 X-RAY EXAM OF ANKLE: CPT

## 2022-01-20 PROCEDURE — S9088 SERVICES PROVIDED IN URGENT: HCPCS

## 2022-01-20 PROCEDURE — 99213 OFFICE O/P EST LOW 20 MIN: CPT

## 2022-01-21 ENCOUNTER — TELEPHONE (OUTPATIENT)
Dept: URGENT CARE | Facility: CLINIC | Age: 37
End: 2022-01-21

## 2022-01-21 DIAGNOSIS — S92.225A CLOSED NONDISPLACED FRACTURE OF LATERAL CUNEIFORM OF LEFT FOOT, INITIAL ENCOUNTER: Primary | ICD-10-CM

## 2022-01-21 NOTE — PROGRESS NOTES
[facility]    NAME: Claudia Reynaga is a 29 y o  male  : 1985    MRN: [id number]  DATE: 2022  TIME: 7:47 PM    Assessment and Plan   Sprain of left ankle, unspecified ligament, initial encounter [S93 [id number]]  1  Sprain of left ankle, unspecified ligament, initial encounter     2  Ankle injury, left, initial encounter  XR ankle 3+ vw left     Xray completed, my read: no acute intraosseous process  Continue RICE and NSAID for pain  ACE wrap compression  Patient Instructions     -Continue RICE : rest, ice, compression, and elevation   -Take antiinflammatory such as ibuprofen to decrease swelling/pain  Follow up with PCP in 3-5 days  Proceed to  ER if symptoms worsen  Chief Complaint     Chief Complaint   Patient presents with    Ankle Injury     left, fell off last step yesterday          History of Present Illness       Walking down the steps, he fell on the left ankle twisting it to the side  Immediately noticed swelling to the ankle  Has not tried any treatments to the area  Pain is a 6/10 throbbing pain  Pain is the lateral side of the ankle  Review of Systems   Review of Systems   Constitutional: Negative for fatigue and fever  Respiratory: Negative for cough and shortness of breath  Cardiovascular: Negative for chest pain  Gastrointestinal: Negative for abdominal pain, nausea and vomiting  Musculoskeletal: Positive for myalgias           Current Medications       Current Outpatient Medications:     benztropine (COGENTIN) 1 mg tablet, Take 1 tablet (1 mg total) by mouth 2 (two) times a day, Disp: 60 tablet, Rfl: 0    benztropine (COGENTIN) 1 mg tablet, Take 1 mg by mouth 2 (two) times a day  , Disp: , Rfl:     diphenhydrAMINE (BENADRYL) 25 mg tablet, Take 1 tablet (25 mg total) by mouth 2 (two) times a day, Disp: 60 tablet, Rfl: 0    levothyroxine 175 mcg tablet, Take 1 tablet (175 mcg total) by mouth daily, Disp: 90 tablet, Rfl: 3    levothyroxine 50 mcg tablet, Take 1 tablet (50 mcg total) by mouth daily in the early morning, Disp: 30 tablet, Rfl: 0    OXcarbazepine (TRILEPTAL) 300 mg tablet, Take 1 tablet (300 mg total) by mouth 3 (three) times a day, Disp: 90 tablet, Rfl: 0    OXcarbazepine (TRILEPTAL) 600 mg tablet, Take 600 mg by mouth every 12 (twelve) hours  , Disp: , Rfl:     perphenazine 4 mg tablet, Take 3 at 1:00PM (Patient not taking: Reported on 1/20/2022 ), Disp: 90 tablet, Rfl: 0    risperiDONE (RisperDAL) 3 mg tablet, Take 3 mg by mouth 2 (two) times a day (Patient not taking: Reported on 1/20/2022 ), Disp: , Rfl:     Current Allergies     Allergies as of 01/20/2022    (No Known Allergies)            The following portions of the patient's history were reviewed and updated as appropriate: allergies, current medications, past family history, past medical history, past social history, past surgical history and problem list      Past Medical History:   Diagnosis Date    Anxiety     Bipolar disorder ([facility] )     Depression     Disease of thyroid gland     [facility]     ETOH abuse     Hallucination     Psychiatric illness     Psychosis ([facility] )     Schizoaffective disorder, bipolar type ([facility] ) 6/30/2018    Schizophrenia ([facility] )        History reviewed  No pertinent surgical history  History reviewed  No pertinent family history  Medications have been verified  Objective   Pulse 103   Temp 98 9 °F (37 2 °C) (Temporal)   Resp 18   Ht 6' 3" (1 905 m)   Wt 95 3 kg (210 lb)   SpO2 96%   BMI 26 25 kg/m²        Physical Exam     Physical Exam  Constitutional:       Appearance: Normal appearance  Cardiovascular:      Rate and Rhythm: Normal rate and regular rhythm  Pulses: Normal pulses  Heart sounds: Normal heart sounds  Pulmonary:      Effort: Pulmonary effort is normal  No respiratory distress  Breath sounds: Normal breath sounds  Musculoskeletal:         General: Normal range of motion        Right ankle: Normal  Normal pulse  Right Achilles Tendon: Normal       Left ankle: Swelling present  No ecchymosis or lacerations  Tenderness present  Normal range of motion  Normal pulse  Left Achilles Tendon: Normal  No tenderness  Skin:     General: Skin is warm and dry  Capillary Refill: Capillary refill takes less than 2 seconds  Neurological:      Mental Status: He is alert and oriented to person, place, and time     Psychiatric:         Mood and Affect: Mood normal          Behavior: Behavior normal

## 2022-01-21 NOTE — PATIENT INSTRUCTIONS
-Continue RICE : rest, ice, compression, and elevation   -Take antiinflammatory such as ibuprofen to decrease swelling/pain  Ankle Sprain   AMBULATORY CARE:   An ankle sprain  happens when 1 or more ligaments in your ankle joint stretch or tear  Ligaments are tough tissues that connect bones  Ligaments support your joints and keep your bones in place  Common symptoms include the following:   · Trouble moving your ankle or foot    · Pain when you touch or put weight on your ankle    · Bruised, swollen, or misshapen ankle    Seek care immediately if:   · You have severe pain in your ankle  · Your foot or toes are cold or numb  · Your ankle becomes more weak or unstable (wobbly)  · You are unable to put any weight on your ankle or foot  · Your swelling has increased or returned  Call your doctor if:   · Your pain does not go away, even after treatment  · You have questions or concerns about your condition or care  Treatment:   · Medicines:      ? NSAIDs , such as ibuprofen, help decrease swelling, pain, and fever  This medicine is available with or without a doctor's order  NSAIDs can cause stomach bleeding or kidney problems in certain people  If you take blood thinner medicine, always ask your healthcare provider if NSAIDs are safe for you  Always read the medicine label and follow directions  ? Acetaminophen  decreases pain and fever  It is available without a doctor's order  Ask how much to take and how often to take it  Follow directions  Read the labels of all other medicines you are using to see if they also contain acetaminophen, or ask your doctor or pharmacist  Acetaminophen can cause liver damage if not taken correctly  Do not use more than 4 grams (4,000 milligrams) total of acetaminophen in one day  ? Prescription pain medicine  may be given  Ask your healthcare provider how to take this medicine safely  Some prescription pain medicines contain acetaminophen   Do not take other medicines that contain acetaminophen without talking to your healthcare provider  Too much acetaminophen may cause liver damage  Prescription pain medicine may cause constipation  Ask your healthcare provider how to prevent or treat constipation  · Surgery  may be needed to repair or replace a torn ligament if your sprain does not heal with other treatments  Your healthcare provider may use screws to attach the bones in your ankle together  The screws may help support your ankle and make it stable  Ask your healthcare provider for more information about surgery to treat your ankle sprain  Self-care:   · Use support devices,  such as a brace, cast, or splint, to limit your movement and protect your joint  You may need to use crutches to decrease your pain as you move around  · Go to physical therapy as directed  A physical therapist teaches you exercises to help improve movement and strength, and to decrease pain  · Rest  your ankle so that it can heal  Return to normal activities as directed  · Apply ice  on your ankle for 15 to 20 minutes every hour or as directed  Use an ice pack, or put crushed ice in a plastic bag  Cover it with a towel  Ice helps prevent tissue damage and decreases swelling and pain  · Compress  your ankle  Ask if you should wrap an elastic bandage around your injured ligament  An elastic bandage provides support and helps decrease swelling and movement so your joint can heal  Wear as long as directed  · Elevate  your ankle above the level of your heart as often as you can  This will help decrease swelling and pain  Prop your ankle on pillows or blankets to keep it elevated comfortably  Prevent another ankle sprain:   · Let your ankle heal   Find out how long your ligament needs to heal  Do not do any physical activity until your healthcare provider says it is okay  If you start activity too soon, you may develop a more serious injury      · Always warm up and stretch  before you exercise or play sports  · Use the right equipment  Always wear shoes that fit well and are made for the activity that you are doing  You may also need ankle supports, elbow and knee pads, or braces  Follow up with your doctor as directed:  Write down your questions so you remember to ask them during your visits  © Copyright [employer] 2021 Information is for End User's use only and may not be sold, redistributed or otherwise used for commercial purposes  All illustrations and images included in CareNotes® are the copyrighted property of [employer]  or [facility]   The above information is an  only  It is not intended as medical advice for individual conditions or treatments  Talk to your doctor, nurse or pharmacist before following any medical regimen to see if it is safe and effective for you

## 2022-01-21 NOTE — TELEPHONE ENCOUNTER
Pt called this morning  States ankle is feeling better  Notified that results are showing a tiny avulsion fracture of the dorsal aspect of the 1st cuneiform bone  Pt to return later today for boot placement and notified of orthopedics referral for further workup  Verbalized understanding of results and plan

## 2022-02-09 ENCOUNTER — OFFICE VISIT (OUTPATIENT)
Dept: FAMILY MEDICINE CLINIC | Facility: CLINIC | Age: 37
End: 2022-02-09
Payer: COMMERCIAL

## 2022-02-09 VITALS
BODY MASS INDEX: 27.48 KG/M2 | DIASTOLIC BLOOD PRESSURE: 74 MMHG | TEMPERATURE: 97.9 F | WEIGHT: 221 LBS | SYSTOLIC BLOOD PRESSURE: 110 MMHG | RESPIRATION RATE: 18 BRPM | HEIGHT: 75 IN | OXYGEN SATURATION: 100 % | HEART RATE: 82 BPM

## 2022-02-09 DIAGNOSIS — H60.501 ACUTE OTITIS EXTERNA OF RIGHT EAR, UNSPECIFIED TYPE: Primary | ICD-10-CM

## 2022-02-09 PROCEDURE — 3008F BODY MASS INDEX DOCD: CPT | Performed by: PHYSICIAN ASSISTANT

## 2022-02-09 PROCEDURE — 99214 OFFICE O/P EST MOD 30 MIN: CPT | Performed by: PHYSICIAN ASSISTANT

## 2022-02-09 RX ORDER — CIPROFLOXACIN 500 MG/1
500 TABLET, FILM COATED ORAL EVERY 12 HOURS SCHEDULED
Qty: 14 TABLET | Refills: 0 | Status: SHIPPED | OUTPATIENT
Start: 2022-02-09 | End: 2022-02-16

## 2022-02-09 NOTE — PROGRESS NOTES
Assessment/Plan:    No problem-specific Assessment & Plan notes found for this encounter  Problem List Items Addressed This Visit     None      Visit Diagnoses     Acute otitis externa of right ear, unspecified type    -  Primary    Relevant Medications    ciprofloxacin (CIPRO) 500 mg tablet    neomycin-polymyxin-hydrocortisone (CORTISPORIN) otic solution          Severe acute otitis externa extending outside ear canal- will tx with cipro PO bid x 7 days in addition cortisporin drops  Close follow up  No indication for imaging at this time- afebrile and tolerating PO    Subjective:      Patient ID: Jonathan Das is a 29 y o  male  2 days ago noticed swelling behind right ear-   Pain with pushing on the ear  Taking tylenol for pain  Used qtips  No fever  No dental pain  No history diabetes   Tolerating PO  Denies nausea or vomiting      The following portions of the patient's history were reviewed and updated as appropriate: allergies, current medications, past family history, past social history, past surgical history and problem list     Review of Systems   Constitutional: Negative for chills, fatigue and fever  HENT: Positive for ear pain  Negative for congestion, ear discharge, sinus pain, sore throat and trouble swallowing  Eyes: Negative for pain, discharge and redness  Respiratory: Negative for cough, chest tightness, shortness of breath and wheezing  Cardiovascular: Negative for chest pain, palpitations and leg swelling  Gastrointestinal: Negative for abdominal pain, diarrhea, nausea and vomiting  Musculoskeletal: Negative for arthralgias, joint swelling and myalgias  Skin: Negative for rash  Neurological: Negative for dizziness, weakness, numbness and headaches           Objective:      /74 (BP Location: Left arm, Patient Position: Sitting)   Pulse 82   Temp 97 9 °F (36 6 °C)   Resp 18   Ht 6' 3" (1 905 m)   Wt 100 kg (221 lb)   SpO2 100%   BMI 27 62 kg/m² Physical Exam  Vitals and nursing note reviewed  Constitutional:       Appearance: He is well-developed  HENT:      Head: Normocephalic  Right Ear: Hearing and tympanic membrane normal  Drainage (purulent) and swelling present  There is mastoid tenderness (erythema extending near mastoid without tenderness)  Left Ear: Hearing and tympanic membrane normal       Nose: No mucosal edema  Mouth/Throat:      Pharynx: Uvula midline  No posterior oropharyngeal erythema  Cardiovascular:      Rate and Rhythm: Normal rate and regular rhythm  Pulmonary:      Effort: Pulmonary effort is normal       Breath sounds: Normal breath sounds

## 2022-03-02 NOTE — PROGRESS NOTES
Patient has been sleeping well tonight, no changes or problems  Q 15 minute safety checks ongoing  First Trimester Sonogram

## 2022-04-28 ENCOUNTER — TELEPHONE (OUTPATIENT)
Dept: FAMILY MEDICINE CLINIC | Facility: CLINIC | Age: 37
End: 2022-04-28

## 2022-05-02 DIAGNOSIS — L70.9 ACNE, UNSPECIFIED ACNE TYPE: Primary | ICD-10-CM

## 2022-05-05 ENCOUNTER — TELEPHONE (OUTPATIENT)
Dept: FAMILY MEDICINE CLINIC | Facility: CLINIC | Age: 37
End: 2022-05-05

## 2022-05-05 PROBLEM — L70.9 ACNE: Status: ACTIVE | Noted: 2022-05-05

## 2022-05-05 NOTE — TELEPHONE ENCOUNTER
Johnathan Merida wants to go to [facility] for his acne  They need something in his office notes pertaining to the necessity for the visit

## 2022-05-05 NOTE — TELEPHONE ENCOUNTER
I sent the paper work to Alejandra Rivas to get the prior Arnoldo Ernie for [facility] to [facility]

## 2022-06-29 ENCOUNTER — ANESTHESIA (OUTPATIENT)
Dept: ANESTHESIOLOGY | Facility: HOSPITAL | Age: 37
End: 2022-06-29

## 2022-06-29 ENCOUNTER — ANESTHESIA (OUTPATIENT)
Dept: PERIOP | Facility: HOSPITAL | Age: 37
DRG: 710 | End: 2022-06-29
Payer: COMMERCIAL

## 2022-06-29 ENCOUNTER — HOSPITAL ENCOUNTER (INPATIENT)
Facility: HOSPITAL | Age: 37
LOS: 2 days | Discharge: HOME | DRG: 710 | End: 2022-07-02
Attending: EMERGENCY MEDICINE | Admitting: SURGERY
Payer: COMMERCIAL

## 2022-06-29 ENCOUNTER — ANESTHESIA EVENT (OUTPATIENT)
Dept: ANESTHESIOLOGY | Facility: HOSPITAL | Age: 37
End: 2022-06-29

## 2022-06-29 ENCOUNTER — OFFICE VISIT (OUTPATIENT)
Dept: FAMILY MEDICINE CLINIC | Facility: CLINIC | Age: 37
End: 2022-06-29
Payer: COMMERCIAL

## 2022-06-29 ENCOUNTER — ANESTHESIA EVENT (OUTPATIENT)
Dept: PERIOP | Facility: HOSPITAL | Age: 37
DRG: 710 | End: 2022-06-29
Payer: COMMERCIAL

## 2022-06-29 ENCOUNTER — HOSPITAL ENCOUNTER (OUTPATIENT)
Dept: CT IMAGING | Facility: HOSPITAL | Age: 37
Discharge: HOME | DRG: 710 | End: 2022-06-29
Payer: COMMERCIAL

## 2022-06-29 VITALS
HEIGHT: 75 IN | WEIGHT: 222 LBS | OXYGEN SATURATION: 98 % | SYSTOLIC BLOOD PRESSURE: 102 MMHG | BODY MASS INDEX: 27.6 KG/M2 | DIASTOLIC BLOOD PRESSURE: 78 MMHG | TEMPERATURE: 99.9 F | HEART RATE: 115 BPM

## 2022-06-29 DIAGNOSIS — R10.31 RIGHT LOWER QUADRANT ABDOMINAL PAIN: Primary | ICD-10-CM

## 2022-06-29 DIAGNOSIS — R10.31 RIGHT LOWER QUADRANT ABDOMINAL PAIN: ICD-10-CM

## 2022-06-29 DIAGNOSIS — R50.9 FEVER, UNSPECIFIED FEVER CAUSE: ICD-10-CM

## 2022-06-29 DIAGNOSIS — K35.80 ACUTE APPENDICITIS: Primary | ICD-10-CM

## 2022-06-29 PROBLEM — K37 APPENDICITIS: Status: ACTIVE | Noted: 2022-06-29

## 2022-06-29 LAB
ALBUMIN SERPL BCP-MCNC: 4.1 G/DL (ref 3.5–5)
ALP SERPL-CCNC: 58 U/L (ref 34–104)
ALT SERPL W P-5'-P-CCNC: 52 U/L (ref 7–52)
ANION GAP SERPL CALCULATED.3IONS-SCNC: 11 MMOL/L (ref 4–13)
APTT PPP: 34 SECONDS (ref 23–37)
AST SERPL W P-5'-P-CCNC: 30 U/L (ref 13–39)
BASOPHILS # BLD AUTO: 0.05 THOUSANDS/ΜL (ref 0–0.1)
BASOPHILS NFR BLD AUTO: 0 % (ref 0–1)
BILIRUB SERPL-MCNC: 0.64 MG/DL (ref 0.2–1)
BUN SERPL-MCNC: 15 MG/DL (ref 5–25)
CALCIUM SERPL-MCNC: 9.2 MG/DL (ref 8.4–10.2)
CHLORIDE SERPL-SCNC: 99 MMOL/L (ref 96–108)
CO2 SERPL-SCNC: 23 MMOL/L (ref 21–32)
CREAT SERPL-MCNC: 1.24 MG/DL (ref 0.6–1.3)
EOSINOPHIL # BLD AUTO: 0.02 THOUSAND/ΜL (ref 0–0.61)
EOSINOPHIL NFR BLD AUTO: 0 % (ref 0–6)
ERYTHROCYTE [DISTWIDTH] IN BLOOD BY AUTOMATED COUNT: 12.6 % (ref 11.6–15.1)
GFR SERPL CREATININE-BSD FRML MDRD: 74 ML/MIN/1.73SQ M
GLUCOSE SERPL-MCNC: 91 MG/DL (ref 65–140)
HCT VFR BLD AUTO: 46 % (ref 36.5–49.3)
HGB BLD-MCNC: 15.3 G/DL (ref 12–17)
IMM GRANULOCYTES # BLD AUTO: 0.07 THOUSAND/UL (ref 0–0.2)
IMM GRANULOCYTES NFR BLD AUTO: 0 % (ref 0–2)
INR PPP: 1.11 (ref 0.84–1.19)
LACTATE SERPL-SCNC: 0.8 MMOL/L (ref 0.5–2)
LYMPHOCYTES # BLD AUTO: 1.2 THOUSANDS/ΜL (ref 0.6–4.47)
LYMPHOCYTES NFR BLD AUTO: 7 % (ref 14–44)
MCH RBC QN AUTO: 30.1 PG (ref 26.8–34.3)
MCHC RBC AUTO-ENTMCNC: 33.3 G/DL (ref 31.4–37.4)
MCV RBC AUTO: 90 FL (ref 82–98)
MONOCYTES # BLD AUTO: 1.39 THOUSAND/ΜL (ref 0.17–1.22)
MONOCYTES NFR BLD AUTO: 8 % (ref 4–12)
NEUTROPHILS # BLD AUTO: 14.18 THOUSANDS/ΜL (ref 1.85–7.62)
NEUTS SEG NFR BLD AUTO: 85 % (ref 43–75)
NRBC BLD AUTO-RTO: 0 /100 WBCS
PLATELET # BLD AUTO: 220 THOUSANDS/UL (ref 149–390)
PMV BLD AUTO: 10.5 FL (ref 8.9–12.7)
POTASSIUM SERPL-SCNC: 3.6 MMOL/L (ref 3.5–5.3)
PROCALCITONIN SERPL-MCNC: 0.38 NG/ML
PROT SERPL-MCNC: 7.3 G/DL (ref 6.4–8.4)
PROTHROMBIN TIME: 14.2 SECONDS (ref 11.6–14.5)
RBC # BLD AUTO: 5.09 MILLION/UL (ref 3.88–5.62)
SARS-COV-2 AG UPPER RESP QL IA: NEGATIVE
SL AMB  POCT GLUCOSE, UA: ABNORMAL
SL AMB LEUKOCYTE ESTERASE,UA: ABNORMAL
SL AMB POCT BILIRUBIN,UA: ABNORMAL
SL AMB POCT BLOOD,UA: ABNORMAL
SL AMB POCT KETONES,UA: ABNORMAL
SL AMB POCT NITRITE,UA: ABNORMAL
SL AMB POCT PH,UA: 5.5
SL AMB POCT SPECIFIC GRAVITY,UA: 1.02
SL AMB POCT URINE PROTEIN: ABNORMAL
SL AMB POCT UROBILINOGEN: ABNORMAL
SODIUM SERPL-SCNC: 133 MMOL/L (ref 135–147)
VALID CONTROL: NORMAL
WBC # BLD AUTO: 16.91 THOUSAND/UL (ref 4.31–10.16)

## 2022-06-29 PROCEDURE — 99215 OFFICE O/P EST HI 40 MIN: CPT | Performed by: PHYSICIAN ASSISTANT

## 2022-06-29 PROCEDURE — 74176 CT ABD & PELVIS W/O CONTRAST: CPT

## 2022-06-29 PROCEDURE — 85610 PROTHROMBIN TIME: CPT | Performed by: EMERGENCY MEDICINE

## 2022-06-29 PROCEDURE — 0DTJ4ZZ RESECTION OF APPENDIX, PERCUTANEOUS ENDOSCOPIC APPROACH: ICD-10-PCS | Performed by: SURGERY

## 2022-06-29 PROCEDURE — 88304 TISSUE EXAM BY PATHOLOGIST: CPT | Performed by: PATHOLOGY

## 2022-06-29 PROCEDURE — 87811 SARS-COV-2 COVID19 W/OPTIC: CPT | Performed by: PHYSICIAN ASSISTANT

## 2022-06-29 PROCEDURE — 36415 COLL VENOUS BLD VENIPUNCTURE: CPT | Performed by: EMERGENCY MEDICINE

## 2022-06-29 PROCEDURE — 85730 THROMBOPLASTIN TIME PARTIAL: CPT | Performed by: EMERGENCY MEDICINE

## 2022-06-29 PROCEDURE — 96365 THER/PROPH/DIAG IV INF INIT: CPT

## 2022-06-29 PROCEDURE — 44970 LAPAROSCOPY APPENDECTOMY: CPT

## 2022-06-29 PROCEDURE — 3008F BODY MASS INDEX DOCD: CPT | Performed by: PHYSICIAN ASSISTANT

## 2022-06-29 PROCEDURE — 44970 LAPAROSCOPY APPENDECTOMY: CPT | Performed by: SURGERY

## 2022-06-29 PROCEDURE — 83605 ASSAY OF LACTIC ACID: CPT | Performed by: EMERGENCY MEDICINE

## 2022-06-29 PROCEDURE — 1036F TOBACCO NON-USER: CPT | Performed by: PHYSICIAN ASSISTANT

## 2022-06-29 PROCEDURE — G1004 CDSM NDSC: HCPCS

## 2022-06-29 PROCEDURE — 99285 EMERGENCY DEPT VISIT HI MDM: CPT

## 2022-06-29 PROCEDURE — 99284 EMERGENCY DEPT VISIT MOD MDM: CPT | Performed by: EMERGENCY MEDICINE

## 2022-06-29 PROCEDURE — 84145 PROCALCITONIN (PCT): CPT | Performed by: EMERGENCY MEDICINE

## 2022-06-29 PROCEDURE — 80053 COMPREHEN METABOLIC PANEL: CPT | Performed by: EMERGENCY MEDICINE

## 2022-06-29 PROCEDURE — 87040 BLOOD CULTURE FOR BACTERIA: CPT | Performed by: EMERGENCY MEDICINE

## 2022-06-29 PROCEDURE — 81002 URINALYSIS NONAUTO W/O SCOPE: CPT | Performed by: PHYSICIAN ASSISTANT

## 2022-06-29 PROCEDURE — 85025 COMPLETE CBC W/AUTO DIFF WBC: CPT | Performed by: EMERGENCY MEDICINE

## 2022-06-29 PROCEDURE — 99204 OFFICE O/P NEW MOD 45 MIN: CPT | Performed by: SURGERY

## 2022-06-29 RX ORDER — ONDANSETRON 2 MG/ML
4 INJECTION INTRAMUSCULAR; INTRAVENOUS ONCE AS NEEDED
Status: DISCONTINUED | OUTPATIENT
Start: 2022-06-29 | End: 2022-06-29 | Stop reason: HOSPADM

## 2022-06-29 RX ORDER — OXYCODONE HYDROCHLORIDE 10 MG/1
10 TABLET ORAL EVERY 6 HOURS PRN
Status: DISCONTINUED | OUTPATIENT
Start: 2022-06-29 | End: 2022-07-02 | Stop reason: HOSPADM

## 2022-06-29 RX ORDER — CEFADROXIL 500 MG/1
500 CAPSULE ORAL EVERY 12 HOURS SCHEDULED
COMMUNITY
End: 2022-07-02

## 2022-06-29 RX ORDER — BUPIVACAINE HYDROCHLORIDE 5 MG/ML
INJECTION, SOLUTION PERINEURAL AS NEEDED
Status: DISCONTINUED | OUTPATIENT
Start: 2022-06-29 | End: 2022-06-29 | Stop reason: HOSPADM

## 2022-06-29 RX ORDER — LIDOCAINE HYDROCHLORIDE 20 MG/ML
INJECTION, SOLUTION EPIDURAL; INFILTRATION; INTRACAUDAL; PERINEURAL AS NEEDED
Status: DISCONTINUED | OUTPATIENT
Start: 2022-06-29 | End: 2022-06-29

## 2022-06-29 RX ORDER — ROCURONIUM BROMIDE 10 MG/ML
INJECTION, SOLUTION INTRAVENOUS AS NEEDED
Status: DISCONTINUED | OUTPATIENT
Start: 2022-06-29 | End: 2022-06-29

## 2022-06-29 RX ORDER — MAGNESIUM HYDROXIDE 1200 MG/15ML
LIQUID ORAL AS NEEDED
Status: DISCONTINUED | OUTPATIENT
Start: 2022-06-29 | End: 2022-06-29 | Stop reason: HOSPADM

## 2022-06-29 RX ORDER — HYDROMORPHONE HCL/PF 1 MG/ML
0.5 SYRINGE (ML) INJECTION
Status: DISCONTINUED | OUTPATIENT
Start: 2022-06-29 | End: 2022-07-02 | Stop reason: HOSPADM

## 2022-06-29 RX ORDER — HYDROMORPHONE HCL/PF 1 MG/ML
0.5 SYRINGE (ML) INJECTION
Status: DISCONTINUED | OUTPATIENT
Start: 2022-06-29 | End: 2022-06-29 | Stop reason: HOSPADM

## 2022-06-29 RX ORDER — ALBUTEROL SULFATE 2.5 MG/3ML
2.5 SOLUTION RESPIRATORY (INHALATION) ONCE AS NEEDED
Status: DISCONTINUED | OUTPATIENT
Start: 2022-06-29 | End: 2022-06-29 | Stop reason: HOSPADM

## 2022-06-29 RX ORDER — ONDANSETRON 2 MG/ML
INJECTION INTRAMUSCULAR; INTRAVENOUS AS NEEDED
Status: DISCONTINUED | OUTPATIENT
Start: 2022-06-29 | End: 2022-06-29

## 2022-06-29 RX ORDER — MIDAZOLAM HYDROCHLORIDE 2 MG/2ML
INJECTION, SOLUTION INTRAMUSCULAR; INTRAVENOUS AS NEEDED
Status: DISCONTINUED | OUTPATIENT
Start: 2022-06-29 | End: 2022-06-29

## 2022-06-29 RX ORDER — CEFEPIME HYDROCHLORIDE 2 G/50ML
2000 INJECTION, SOLUTION INTRAVENOUS ONCE
Status: DISCONTINUED | OUTPATIENT
Start: 2022-06-29 | End: 2022-06-29

## 2022-06-29 RX ORDER — SODIUM CHLORIDE, SODIUM LACTATE, POTASSIUM CHLORIDE, CALCIUM CHLORIDE 600; 310; 30; 20 MG/100ML; MG/100ML; MG/100ML; MG/100ML
100 INJECTION, SOLUTION INTRAVENOUS CONTINUOUS
Status: DISCONTINUED | OUTPATIENT
Start: 2022-06-29 | End: 2022-07-01

## 2022-06-29 RX ORDER — PROPOFOL 10 MG/ML
INJECTION, EMULSION INTRAVENOUS AS NEEDED
Status: DISCONTINUED | OUTPATIENT
Start: 2022-06-29 | End: 2022-06-29

## 2022-06-29 RX ORDER — FENTANYL CITRATE 50 UG/ML
INJECTION, SOLUTION INTRAMUSCULAR; INTRAVENOUS AS NEEDED
Status: DISCONTINUED | OUTPATIENT
Start: 2022-06-29 | End: 2022-06-29

## 2022-06-29 RX ORDER — DEXAMETHASONE SODIUM PHOSPHATE 10 MG/ML
INJECTION, SOLUTION INTRAMUSCULAR; INTRAVENOUS AS NEEDED
Status: DISCONTINUED | OUTPATIENT
Start: 2022-06-29 | End: 2022-06-29

## 2022-06-29 RX ORDER — FENTANYL CITRATE 50 UG/ML
50 INJECTION, SOLUTION INTRAMUSCULAR; INTRAVENOUS ONCE
Status: COMPLETED | OUTPATIENT
Start: 2022-06-29 | End: 2022-06-29

## 2022-06-29 RX ORDER — KETOROLAC TROMETHAMINE 30 MG/ML
INJECTION, SOLUTION INTRAMUSCULAR; INTRAVENOUS AS NEEDED
Status: DISCONTINUED | OUTPATIENT
Start: 2022-06-29 | End: 2022-06-29

## 2022-06-29 RX ORDER — FENTANYL CITRATE/PF 50 MCG/ML
25 SYRINGE (ML) INJECTION
Status: DISCONTINUED | OUTPATIENT
Start: 2022-06-29 | End: 2022-06-29 | Stop reason: HOSPADM

## 2022-06-29 RX ORDER — MORPHINE SULFATE 10 MG/ML
4 INJECTION, SOLUTION INTRAMUSCULAR; INTRAVENOUS
Status: DISCONTINUED | OUTPATIENT
Start: 2022-06-29 | End: 2022-07-02 | Stop reason: HOSPADM

## 2022-06-29 RX ORDER — SUCCINYLCHOLINE/SOD CL,ISO/PF 100 MG/5ML
SYRINGE (ML) INTRAVENOUS AS NEEDED
Status: DISCONTINUED | OUTPATIENT
Start: 2022-06-29 | End: 2022-06-29

## 2022-06-29 RX ORDER — CEFAZOLIN SODIUM 1 G/3ML
INJECTION, POWDER, FOR SOLUTION INTRAMUSCULAR; INTRAVENOUS AS NEEDED
Status: DISCONTINUED | OUTPATIENT
Start: 2022-06-29 | End: 2022-06-29

## 2022-06-29 RX ADMIN — KETOROLAC TROMETHAMINE 30 MG: 30 INJECTION, SOLUTION INTRAMUSCULAR at 21:26

## 2022-06-29 RX ADMIN — LIDOCAINE HYDROCHLORIDE 100 MG: 20 INJECTION, SOLUTION EPIDURAL; INFILTRATION; INTRACAUDAL at 20:19

## 2022-06-29 RX ADMIN — PIPERACILLIN AND TAZOBACTAM 4.5 G: 4; .5 INJECTION, POWDER, FOR SOLUTION INTRAVENOUS at 19:04

## 2022-06-29 RX ADMIN — ROCURONIUM BROMIDE 10 MG: 50 INJECTION, SOLUTION INTRAVENOUS at 21:11

## 2022-06-29 RX ADMIN — OXYCODONE HYDROCHLORIDE 10 MG: 10 TABLET ORAL at 22:32

## 2022-06-29 RX ADMIN — SUGAMMADEX 200 MG: 100 INJECTION, SOLUTION INTRAVENOUS at 21:35

## 2022-06-29 RX ADMIN — ONDANSETRON 4 MG: 2 INJECTION INTRAMUSCULAR; INTRAVENOUS at 21:22

## 2022-06-29 RX ADMIN — MORPHINE SULFATE 4 MG: 10 INJECTION INTRAVENOUS at 23:48

## 2022-06-29 RX ADMIN — CEFAZOLIN 2000 MG: 1 INJECTION, POWDER, FOR SOLUTION INTRAMUSCULAR; INTRAVENOUS at 20:24

## 2022-06-29 RX ADMIN — FENTANYL CITRATE 50 MCG: 50 INJECTION, SOLUTION INTRAMUSCULAR; INTRAVENOUS at 19:57

## 2022-06-29 RX ADMIN — DEXAMETHASONE SODIUM PHOSPHATE 8 MG: 10 INJECTION INTRAMUSCULAR; INTRAVENOUS at 20:24

## 2022-06-29 RX ADMIN — Medication 100 MG: at 20:19

## 2022-06-29 RX ADMIN — FENTANYL CITRATE 50 MCG: 50 INJECTION, SOLUTION INTRAMUSCULAR; INTRAVENOUS at 20:48

## 2022-06-29 RX ADMIN — ROCURONIUM BROMIDE 25 MG: 50 INJECTION, SOLUTION INTRAVENOUS at 20:24

## 2022-06-29 RX ADMIN — SODIUM CHLORIDE: 0.9 INJECTION, SOLUTION INTRAVENOUS at 20:28

## 2022-06-29 RX ADMIN — ROCURONIUM BROMIDE 10 MG: 50 INJECTION, SOLUTION INTRAVENOUS at 20:19

## 2022-06-29 RX ADMIN — PROPOFOL 200 MG: 10 INJECTION, EMULSION INTRAVENOUS at 20:19

## 2022-06-29 RX ADMIN — MIDAZOLAM HYDROCHLORIDE 2 MG: 1 INJECTION, SOLUTION INTRAMUSCULAR; INTRAVENOUS at 20:16

## 2022-06-29 RX ADMIN — SODIUM CHLORIDE, SODIUM LACTATE, POTASSIUM CHLORIDE, AND CALCIUM CHLORIDE 125 ML/HR: .6; .31; .03; .02 INJECTION, SOLUTION INTRAVENOUS at 22:10

## 2022-06-29 RX ADMIN — SODIUM CHLORIDE 1000 ML: 0.9 INJECTION, SOLUTION INTRAVENOUS at 19:02

## 2022-06-29 RX ADMIN — FENTANYL CITRATE 50 MCG: 50 INJECTION, SOLUTION INTRAMUSCULAR; INTRAVENOUS at 20:19

## 2022-06-29 NOTE — ANESTHESIA PREPROCEDURE EVALUATION
Procedure:  PRE-OP ONLY    Acute appendicitis     ECG: NSR    Relevant Problems   ENDO   (+) Hypothyroidism      NEURO/PSYCH   (+) Schizophrenia (HCC)             Anesthesia Plan  ASA Score- 2 Emergent    Anesthesia Type- general with ASA Monitors  Additional Monitors:   Airway Plan: ETT  Plan Factors-    Chart reviewed               Induction-     Postoperative Plan-     Informed Consent-

## 2022-06-29 NOTE — PROGRESS NOTES
Assessment/Plan:    No problem-specific Assessment & Plan notes found for this encounter  Diagnoses and all orders for this visit:    Right lower quadrant abdominal pain  -     POCT Rapid Covid Ag  -     POCT urine dip  -     CT abdomen pelvis wo contrast; Future  -     CBC and differential; Future  -     Lipase; Future  -     Comprehensive metabolic panel; Future    Fever, unspecified fever cause  -     CT abdomen pelvis wo contrast; Future  -     CBC and differential; Future  -     Lipase; Future  -     Comprehensive metabolic panel; Future      STAT labs and CT abdomen suspect acute abdominal pathology  Urine dip in office negative for leuks   COVID in office negative      Subjective:      Patient ID: Pascale Fuentse is a 29 y o  male  Patient presents for abdominal pain, fever, generalized body aches   Describes RLQ pain started acutely yesterday   Tolerating po   tmax 101 5  Denies nausea, vomiting, diarrhea     COVID vaccinated with booster and flu vaccinated      The following portions of the patient's history were reviewed and updated as appropriate: allergies, past family history, past medical history, past social history, past surgical history and problem list     Review of Systems   Constitutional: Positive for fever  Negative for chills and fatigue  HENT: Negative for congestion, ear pain, sinus pain, sore throat and trouble swallowing  Eyes: Negative for pain, discharge and redness  Respiratory: Negative for cough, chest tightness, shortness of breath and wheezing  Cardiovascular: Negative for chest pain, palpitations and leg swelling  Gastrointestinal: Positive for abdominal pain  Negative for diarrhea, nausea and vomiting  Musculoskeletal: Positive for myalgias  Negative for arthralgias and joint swelling  Skin: Negative for rash  Neurological: Negative for dizziness, weakness, numbness and headaches           Objective:      /78 (BP Location: Left arm, Patient Position: Sitting, Cuff Size: Large)   Pulse (!) 115   Temp 99 9 °F (37 7 °C)   Ht 6' 3" (1 905 m)   Wt 101 kg (222 lb)   SpO2 98%   BMI 27 75 kg/m²          Physical Exam  Vitals and nursing note reviewed  Constitutional:       General: He is not in acute distress  Appearance: Normal appearance  He is well-developed  Cardiovascular:      Rate and Rhythm: Normal rate and regular rhythm  Pulmonary:      Effort: Pulmonary effort is normal       Breath sounds: Normal breath sounds  Abdominal:      General: Bowel sounds are normal       Palpations: Abdomen is soft  Abdomen is not rigid  Tenderness: There is abdominal tenderness in the right lower quadrant  There is rebound  There is no right CVA tenderness, left CVA tenderness or guarding  Negative signs include James's sign and McBurney's sign  Hernia: No hernia is present  Skin:     General: Skin is warm and dry

## 2022-06-29 NOTE — H&P
Consultation - General Surgery   Rafiq Jose 29 y o  male MRN: [id number]  Unit/Bed#: TR 03 Encounter: [id number]    Assessment/Plan     Assessment:  72-year-old male with history of psychiatric illness, hypothyroidism, ETOH abuse currently sober, now admitted with acute appendicitis  Plan:    Acute appendicitis  - IV fluids  - IV antibiotics  - NPO  - preop consent for laparoscopic appendectomy, possible open  - IV pain control    Psychiatric illness  - restart psychiatric meds tomorrow morning    Hypothyroidism  - levothyroxine order for tomorrow     History of Present Illness     HPI:  Rafiq Jose is a 29 y o  male with known psychiatric illness, hypothyroidism, history of EtOH abuse currently sober, presents to the ER with 1 day history of worsening right lower quadrant pain  Patient describes the pain as sharp stabbing in the right lower quadrant nonradiating  Pain started yesterday and worsened throughout the day and night  He has been eating very little  Denies any nausea vomiting  States he has had a low-grade fever 99  No changes in bowel bladder habits  Consults    Review of Systems    Review systems completed, all negative except as noted above HPI  Historical Information   Past Medical History:   Diagnosis Date    Anxiety     [facility]     Depression     Disease of thyroid gland     [facility]     ETOH abuse     Hallucination     Psychiatric illness     Psychosis ([facility] )     Schizoaffective disorder, bipolar type ([facility] ) 6/30/2018    Schizophrenia ([facility] )      No past surgical history on file    Social History   Social History     Substance and Sexual Activity   Alcohol Use Not Currently    Comment: not in past 5 months per pt     Social History     Substance and Sexual Activity   Drug Use Not Currently    Types: Methamphetamines    Comment: last used 5 months ago (snort or smoked)     Social History     Tobacco Use   Smoking Status Former Smoker Smokeless Tobacco Current User   Tobacco Comment    vapes     Family History: non-contributory    Meds/Allergies   all current active meds have been reviewed  No Known Allergies    Objective   First Vitals:   Blood Pressure: 110/70 (06/29/22 1858)  Pulse: 98 (06/29/22 1858)  Temperature: 99 °F (37 2 °C) (06/29/22 1858)  Temp Source: Tympanic (06/29/22 1858)  Respirations: 18 (06/29/22 1858)  SpO2: 98 % (06/29/22 1858)    Current Vitals:   Blood Pressure: 118/69 (06/29/22 1902)  Pulse: 94 (06/29/22 1902)  Temperature: 99 6 °F (37 6 °C) (06/29/22 1902)  Temp Source: Tympanic (06/29/22 1902)  Respirations: 18 (06/29/22 1902)  SpO2: 99 % (06/29/22 1902)    No intake or output data in the 24 hours ending 06/29/22 1935    Invasive Devices  Report    Peripheral Intravenous Line  Duration           Peripheral IV 06/29/22 Left Hand <1 day                Physical Exam  Vitals reviewed  Constitutional:       General: He is not in acute distress  Appearance: Normal appearance  He is not ill-appearing, toxic-appearing or diaphoretic  HENT:      Head: Normocephalic and atraumatic  Right Ear: External ear normal       Left Ear: External ear normal    Eyes:      General: No scleral icterus  Right eye: No discharge  Left eye: No discharge  Cardiovascular:      Rate and Rhythm: Normal rate and regular rhythm  Heart sounds: Normal heart sounds  No murmur heard  No friction rub  No gallop  Pulmonary:      Effort: Pulmonary effort is normal  No respiratory distress  Breath sounds: Normal breath sounds  No stridor  No wheezing or rhonchi  Abdominal:      General: Abdomen is flat  There is no distension  Palpations: Abdomen is soft  There is no mass  Tenderness: There is abdominal tenderness (Dry tender palpation right lower quadrant)  There is guarding (Voluntary) and rebound  Musculoskeletal:         General: No swelling, deformity or signs of injury  Normal range of motion  Cervical back: Normal range of motion and neck supple  Right lower leg: No edema  Left lower leg: No edema  Skin:     General: Skin is warm  Coloration: Skin is not jaundiced or pale  Findings: No bruising or erythema  Neurological:      General: No focal deficit present  Mental Status: He is alert and oriented to person, place, and time  Cranial Nerves: No cranial nerve deficit  Psychiatric:         Mood and Affect: Mood normal          Behavior: Behavior normal          Thought Content: Thought content normal          Judgment: Judgment normal          Lab Results:   I have personally reviewed pertinent lab results  , CBC:   Lab Results   Component Value Date    WBC 16 91 (H) 06/29/2022    HGB 15 3 06/29/2022    HCT 46 0 06/29/2022    MCV 90 06/29/2022     06/29/2022    MCH 30 1 06/29/2022    MCHC 33 3 06/29/2022    RDW 12 6 06/29/2022    MPV 10 5 06/29/2022    NRBC 0 06/29/2022   , CMP:   Lab Results   Component Value Date    SODIUM 133 (L) 06/29/2022    K 3 6 06/29/2022    CL 99 06/29/2022    CO2 23 06/29/2022    BUN 15 06/29/2022    CREATININE 1 24 06/29/2022    CALCIUM 9 2 06/29/2022    AST 30 06/29/2022    ALT 52 06/29/2022    ALKPHOS 58 06/29/2022    EGFR 74 06/29/2022   , Coagulation:   Lab Results   Component Value Date    INR 1 11 06/29/2022   , Urinalysis:   Lab Results   Component Value Date    LEUKOCYTESUR neg 06/29/2022    NITRITE neg 06/29/2022    GLUCOSEU neg 06/29/2022    Karolynn Cleaves trace 06/29/2022    BILIRUBINUR small 06/29/2022    BLOODU neg 06/29/2022   , Amylase: No results found for: AMYLASE, Lipase: No results found for: LIPASE  Imaging: I have personally reviewed pertinent films in PACS  EKG, Pathology, and Other Studies: I have personally reviewed pertinent reports

## 2022-06-29 NOTE — ED PROVIDER NOTES
History  No chief complaint on file  29-year-old male presenting with right lower quadrant pain over last 2 days  Patient states symptoms began yesterday, gradually worsened throughout the day  Patient saw his primary care physician who were her a outpatient CT scan demonstrates acute appendicitis  States pain is bearable  Abdominal Pain  Pain location:  RLQ  Pain quality: dull    Pain radiates to:  Does not radiate  Pain severity:  Mild  Onset quality:  Gradual  Duration:  2 days  Timing:  Constant  Progression:  Worsening  Chronicity:  New  Associated symptoms: no chest pain, no chills, no cough, no diarrhea, no dysuria, no fever, no nausea, no sore throat and no vomiting        Prior to Admission Medications   Prescriptions Last Dose Informant Patient Reported? Taking?    OXcarbazepine (TRILEPTAL) 300 mg tablet   No No   Sig: Take 1 tablet (300 mg total) by mouth 3 (three) times a day   OXcarbazepine (TRILEPTAL) 600 mg tablet   Yes No   Sig: Take 600 mg by mouth every 12 (twelve) hours     benztropine (COGENTIN) 1 mg tablet   No No   Sig: Take 1 tablet (1 mg total) by mouth 2 (two) times a day   benztropine (COGENTIN) 1 mg tablet   Yes No   Sig: Take 1 mg by mouth 2 (two) times a day     Patient not taking: Reported on 6/29/2022   diphenhydrAMINE (BENADRYL) 25 mg tablet   No No   Sig: Take 1 tablet (25 mg total) by mouth 2 (two) times a day   Patient not taking: No sig reported   levothyroxine 175 mcg tablet   No No   Sig: Take 1 tablet (175 mcg total) by mouth daily   levothyroxine 50 mcg tablet   No No   Sig: Take 1 tablet (50 mcg total) by mouth daily in the early morning   neomycin-polymyxin-hydrocortisone (CORTISPORIN) otic solution   No No   Sig: Administer 4 drops to the right ear every 6 (six) hours   perphenazine 4 mg tablet   No No   Sig: Take 3 at 1:00PM   risperiDONE (RisperDAL) 3 mg tablet   Yes No   Sig: Take 3 mg by mouth 2 (two) times a day        Facility-Administered Medications: None Past Medical History:   Diagnosis Date    Anxiety     Bipolar disorder ([facility] )     Depression     Disease of thyroid gland     Drug abuse ([facility] )     ETOH abuse     Hallucination     Psychiatric illness     Psychosis ([place] [address] )     Schizoaffective disorder, bipolar type ([place] [address] ) 6/30/2018    Schizophrenia ([place] [address] )        No past surgical history on file  No family history on file  I have reviewed and agree with the history as documented  E-Cigarette/Vaping    E-Cigarette Use Current Every Day User      E-Cigarette/Vaping Substances     Social History     Tobacco Use    Smoking status: Former Smoker    Smokeless tobacco: Current User    Tobacco comment: vapes   Vaping Use    Vaping Use: Every day   Substance Use Topics    Alcohol use: Not Currently     Comment: not in past 5 months per pt    Drug use: Not Currently     Types: Methamphetamines     Comment: last used 5 months ago (snort or smoked)       Review of Systems   Constitutional: Negative for chills and fever  HENT: Negative for congestion, nosebleeds, rhinorrhea and sore throat  Eyes: Negative for pain and visual disturbance  Respiratory: Negative for cough and wheezing  Cardiovascular: Negative for chest pain and leg swelling  Gastrointestinal: Positive for abdominal pain  Negative for abdominal distention, diarrhea, nausea and vomiting  Genitourinary: Negative for dysuria and frequency  Musculoskeletal: Negative for back pain and joint swelling  Skin: Negative for rash and wound  Neurological: Negative for weakness and numbness  Psychiatric/Behavioral: Negative for decreased concentration and suicidal ideas  Physical Exam  Physical Exam  Vitals and nursing note reviewed  Constitutional:       Appearance: He is well-developed  HENT:      Head: Normocephalic and atraumatic  Eyes:      Conjunctiva/sclera: Conjunctivae normal       Pupils: Pupils are equal, round, and reactive to light     Neck:      Trachea: No tracheal deviation  Cardiovascular:      Rate and Rhythm: Normal rate and regular rhythm  Heart sounds: Normal heart sounds  No murmur heard  Pulmonary:      Effort: Pulmonary effort is normal  No respiratory distress  Breath sounds: Normal breath sounds  No wheezing or rales  Abdominal:      General: Bowel sounds are normal  There is no distension  Palpations: Abdomen is soft  Tenderness: There is abdominal tenderness  Musculoskeletal:         General: No deformity  Cervical back: Normal range of motion and neck supple  Skin:     General: Skin is warm and dry  Capillary Refill: Capillary refill takes less than 2 seconds  Neurological:      Mental Status: He is alert and oriented to person, place, and time  Sensory: No sensory deficit  Psychiatric:         Judgment: Judgment normal          Vital Signs  ED Triage Vitals [06/29/22 1858]   Temperature Pulse Respirations Blood Pressure SpO2   99 °F (37 2 °C) 98 18 110/70 98 %      Temp Source Heart Rate Source Patient Position - Orthostatic VS BP Location FiO2 (%)   Tympanic Monitor Sitting Left arm --      Pain Score       --           Vitals:    06/29/22 1858 06/29/22 1902   BP: 110/70 118/69   Pulse: 98 94   Patient Position - Orthostatic VS: Sitting Lying         Visual Acuity      ED Medications  Medications   piperacillin-tazobactam (ZOSYN) IVPB 4 5 g (4 5 g Intravenous New Bag 6/29/22 1904)   sodium chloride 0 9 % bolus 1,000 mL (1,000 mL Intravenous New Bag 6/29/22 1902)       Diagnostic Studies  Results Reviewed     Procedure Component Value Units Date/Time    Protime-INR [[id number]] Collected: 06/29/22 1901    Lab Status: In process Specimen: Blood from Arm, Left Updated: 06/29/22 1904    APTT [[id number]] Collected: 06/29/22 1901    Lab Status:  In process Specimen: Blood from Arm, Left Updated: 06/29/22 1904    CBC and differential [[id number]]  (Abnormal) Collected: 06/29/22 1834    Lab Status: Final result Specimen: Blood from Arm, Left Updated: 06/29/22 1851     WBC 16 91 Thousand/uL      RBC 5 09 Million/uL      Hemoglobin 15 3 g/dL      Hematocrit 46 0 %      MCV 90 fL      MCH 30 1 pg      MCHC 33 3 g/dL      RDW 12 6 %      MPV 10 5 fL      Platelets 195 Thousands/uL      nRBC 0 /100 WBCs      Neutrophils Relative 85 %      Immat GRANS % 0 %      Lymphocytes Relative 7 %      Monocytes Relative 8 %      Eosinophils Relative 0 %      Basophils Relative 0 %      Neutrophils Absolute 14 18 Thousands/µL      Immature Grans Absolute 0 07 Thousand/uL      Lymphocytes Absolute 1 20 Thousands/µL      Monocytes Absolute 1 39 Thousand/µL      Eosinophils Absolute 0 02 Thousand/µL      Basophils Absolute 0 05 Thousands/µL     Blood culture #1 [[id number]] Collected: 06/29/22 1834    Lab Status: In process Specimen: Blood from Arm, Left Updated: 06/29/22 1849    Comprehensive metabolic panel [[id number]] Collected: 06/29/22 1834    Lab Status: In process Specimen: Blood from Arm, Left Updated: 06/29/22 1849    Lactic acid [[id number]] Collected: 06/29/22 1834    Lab Status: In process Specimen: Blood from Arm, Left Updated: 06/29/22 1849    Procalcitonin [[id number]] Collected: 06/29/22 1834    Lab Status: In process Specimen: Blood from Arm, Left Updated: 06/29/22 1849    Blood culture #2 [[id number]] Collected: 06/29/22 1835    Lab Status:  In process Specimen: Blood from Arm, Right Updated: 06/29/22 1838    UA w Reflex to Microscopic w Reflex to Culture [[id number]]     Lab Status: No result Specimen: Urine                  No orders to display              Procedures  Procedures         ED Course                                             MDM  Number of Diagnoses or Management Options  Acute appendicitis: new and requires workup  Diagnosis management comments: 50-year-old male presenting with right-sided abdominal pain  Appendicitis on outpatient imaging  Discussed with Dr Sarita Landrum       Amount and/or Complexity of Data Reviewed  Review and summarize past medical records: yes  Independent visualization of images, tracings, or specimens: yes    Risk of Complications, Morbidity, and/or Mortality  Presenting problems: high  Diagnostic procedures: minimal  Management options: high        Disposition  Final diagnoses:   Acute appendicitis     Time reflects when diagnosis was documented in both MDM as applicable and the Disposition within this note     Time User Action Codes Description Comment    6/29/2022  6:20 PM Deep Heavenly Add [K30 80] Acute appendicitis       ED Disposition     ED Disposition   Send to OR    Condition   --    Date/Time   Wed Jun 29, 2022  6:20 PM    Comment   --         Follow-up Information    None         Patient's Medications   Discharge Prescriptions    No medications on file       No discharge procedures on file      PDMP Review     None          ED Provider  Electronically Signed by           Michell Ovalle DO  06/29/22 6850

## 2022-06-29 NOTE — LETTER
[address] [place] [address]  Dept: [phone]    July 2, 2022     Patient: Hernan Nieves   YOB: 1985   Date of Visit: 6/29/2022       To Whom it May Concern:    Tricia Carranza is under my professional care  He was seen in the hospital from 6/29/2022   to 07/02/22  He may return to work on 7/17/2022 with the following limitations light duty       If you have any questions or concerns, please don't hesitate to call           Sincerely,          Elías Cardoza PA-C

## 2022-06-30 LAB
ANION GAP SERPL CALCULATED.3IONS-SCNC: 9 MMOL/L (ref 4–13)
BASOPHILS # BLD AUTO: 0.01 THOUSANDS/ΜL (ref 0–0.1)
BASOPHILS NFR BLD AUTO: 0 % (ref 0–1)
BUN SERPL-MCNC: 13 MG/DL (ref 5–25)
CALCIUM SERPL-MCNC: 8.1 MG/DL (ref 8.4–10.2)
CHLORIDE SERPL-SCNC: 100 MMOL/L (ref 96–108)
CO2 SERPL-SCNC: 23 MMOL/L (ref 21–32)
CREAT SERPL-MCNC: 1.11 MG/DL (ref 0.6–1.3)
EOSINOPHIL # BLD AUTO: 0 THOUSAND/ΜL (ref 0–0.61)
EOSINOPHIL NFR BLD AUTO: 0 % (ref 0–6)
ERYTHROCYTE [DISTWIDTH] IN BLOOD BY AUTOMATED COUNT: 12.8 % (ref 11.6–15.1)
GFR SERPL CREATININE-BSD FRML MDRD: 85 ML/MIN/1.73SQ M
GLUCOSE P FAST SERPL-MCNC: 113 MG/DL (ref 65–99)
GLUCOSE SERPL-MCNC: 113 MG/DL (ref 65–140)
HCT VFR BLD AUTO: 42.1 % (ref 36.5–49.3)
HGB BLD-MCNC: 13.8 G/DL (ref 12–17)
IMM GRANULOCYTES # BLD AUTO: 0.04 THOUSAND/UL (ref 0–0.2)
IMM GRANULOCYTES NFR BLD AUTO: 0 % (ref 0–2)
LYMPHOCYTES # BLD AUTO: 0.54 THOUSANDS/ΜL (ref 0.6–4.47)
LYMPHOCYTES NFR BLD AUTO: 5 % (ref 14–44)
MAGNESIUM SERPL-MCNC: 1.9 MG/DL (ref 1.9–2.7)
MCH RBC QN AUTO: 30 PG (ref 26.8–34.3)
MCHC RBC AUTO-ENTMCNC: 32.8 G/DL (ref 31.4–37.4)
MCV RBC AUTO: 92 FL (ref 82–98)
MONOCYTES # BLD AUTO: 0.86 THOUSAND/ΜL (ref 0.17–1.22)
MONOCYTES NFR BLD AUTO: 7 % (ref 4–12)
NEUTROPHILS # BLD AUTO: 10.68 THOUSANDS/ΜL (ref 1.85–7.62)
NEUTS SEG NFR BLD AUTO: 88 % (ref 43–75)
NRBC BLD AUTO-RTO: 0 /100 WBCS
PLATELET # BLD AUTO: 198 THOUSANDS/UL (ref 149–390)
PMV BLD AUTO: 9.8 FL (ref 8.9–12.7)
POTASSIUM SERPL-SCNC: 3.6 MMOL/L (ref 3.5–5.3)
RBC # BLD AUTO: 4.6 MILLION/UL (ref 3.88–5.62)
SODIUM SERPL-SCNC: 132 MMOL/L (ref 135–147)
WBC # BLD AUTO: 12.13 THOUSAND/UL (ref 4.31–10.16)

## 2022-06-30 PROCEDURE — 83735 ASSAY OF MAGNESIUM: CPT | Performed by: SURGERY

## 2022-06-30 PROCEDURE — 99024 POSTOP FOLLOW-UP VISIT: CPT | Performed by: PHYSICIAN ASSISTANT

## 2022-06-30 PROCEDURE — 80048 BASIC METABOLIC PNL TOTAL CA: CPT | Performed by: SURGERY

## 2022-06-30 PROCEDURE — 85025 COMPLETE CBC W/AUTO DIFF WBC: CPT | Performed by: SURGERY

## 2022-06-30 RX ORDER — ENOXAPARIN SODIUM 100 MG/ML
40 INJECTION SUBCUTANEOUS DAILY
Status: DISCONTINUED | OUTPATIENT
Start: 2022-06-30 | End: 2022-07-02 | Stop reason: HOSPADM

## 2022-06-30 RX ORDER — ONDANSETRON 2 MG/ML
4 INJECTION INTRAMUSCULAR; INTRAVENOUS EVERY 6 HOURS PRN
Status: DISCONTINUED | OUTPATIENT
Start: 2022-06-30 | End: 2022-07-02 | Stop reason: HOSPADM

## 2022-06-30 RX ORDER — RISPERIDONE 1 MG/1
3 TABLET, FILM COATED ORAL 2 TIMES DAILY
Status: DISCONTINUED | OUTPATIENT
Start: 2022-06-30 | End: 2022-07-02 | Stop reason: HOSPADM

## 2022-06-30 RX ORDER — CEFAZOLIN SODIUM 2 G/50ML
2000 SOLUTION INTRAVENOUS EVERY 8 HOURS
Status: DISCONTINUED | OUTPATIENT
Start: 2022-06-30 | End: 2022-06-30

## 2022-06-30 RX ORDER — BENZTROPINE MESYLATE 0.5 MG/1
1 TABLET ORAL 2 TIMES DAILY
Status: DISCONTINUED | OUTPATIENT
Start: 2022-06-30 | End: 2022-07-02 | Stop reason: HOSPADM

## 2022-06-30 RX ORDER — ACETAMINOPHEN 325 MG/1
650 TABLET ORAL EVERY 6 HOURS PRN
Status: DISCONTINUED | OUTPATIENT
Start: 2022-06-30 | End: 2022-07-02 | Stop reason: HOSPADM

## 2022-06-30 RX ORDER — OXCARBAZEPINE 600 MG/1
600 TABLET, FILM COATED ORAL EVERY 12 HOURS SCHEDULED
Status: DISCONTINUED | OUTPATIENT
Start: 2022-06-30 | End: 2022-07-02 | Stop reason: HOSPADM

## 2022-06-30 RX ORDER — MAGNESIUM HYDROXIDE/ALUMINUM HYDROXICE/SIMETHICONE 120; 1200; 1200 MG/30ML; MG/30ML; MG/30ML
30 SUSPENSION ORAL EVERY 4 HOURS PRN
Status: DISCONTINUED | OUTPATIENT
Start: 2022-06-30 | End: 2022-07-02 | Stop reason: HOSPADM

## 2022-06-30 RX ADMIN — BENZTROPINE MESYLATE 1 MG: 0.5 TABLET ORAL at 18:05

## 2022-06-30 RX ADMIN — RISPERIDONE 3 MG: 1 TABLET ORAL at 09:12

## 2022-06-30 RX ADMIN — OXCARBAZEPINE 600 MG: 600 TABLET, FILM COATED ORAL at 20:02

## 2022-06-30 RX ADMIN — Medication 3.38 G: at 01:24

## 2022-06-30 RX ADMIN — OXYCODONE HYDROCHLORIDE 10 MG: 10 TABLET ORAL at 09:18

## 2022-06-30 RX ADMIN — Medication 3.38 G: at 07:58

## 2022-06-30 RX ADMIN — OXCARBAZEPINE 600 MG: 600 TABLET, FILM COATED ORAL at 09:12

## 2022-06-30 RX ADMIN — RISPERIDONE 3 MG: 1 TABLET ORAL at 18:05

## 2022-06-30 RX ADMIN — SODIUM CHLORIDE, SODIUM LACTATE, POTASSIUM CHLORIDE, AND CALCIUM CHLORIDE 125 ML/HR: .6; .31; .03; .02 INJECTION, SOLUTION INTRAVENOUS at 08:00

## 2022-06-30 RX ADMIN — ENOXAPARIN SODIUM 40 MG: 100 INJECTION SUBCUTANEOUS at 09:12

## 2022-06-30 RX ADMIN — CEFAZOLIN SODIUM 2000 MG: 2 SOLUTION INTRAVENOUS at 03:16

## 2022-06-30 RX ADMIN — OXYCODONE HYDROCHLORIDE 10 MG: 10 TABLET ORAL at 18:05

## 2022-06-30 RX ADMIN — HYDROMORPHONE HYDROCHLORIDE 0.5 MG: 1 INJECTION, SOLUTION INTRAMUSCULAR; INTRAVENOUS; SUBCUTANEOUS at 00:26

## 2022-06-30 RX ADMIN — Medication 3.38 G: at 14:28

## 2022-06-30 RX ADMIN — Medication 3.38 G: at 20:01

## 2022-06-30 RX ADMIN — ALUMINUM HYDROXIDE, MAGNESIUM HYDROXIDE, AND SIMETHICONE 30 ML: 200; 200; 20 SUSPENSION ORAL at 02:51

## 2022-06-30 RX ADMIN — MORPHINE SULFATE 4 MG: 10 INJECTION INTRAVENOUS at 02:52

## 2022-06-30 RX ADMIN — ALUMINUM HYDROXIDE, MAGNESIUM HYDROXIDE, AND SIMETHICONE 30 ML: 200; 200; 20 SUSPENSION ORAL at 06:39

## 2022-06-30 RX ADMIN — SODIUM CHLORIDE, SODIUM LACTATE, POTASSIUM CHLORIDE, AND CALCIUM CHLORIDE 100 ML/HR: .6; .31; .03; .02 INJECTION, SOLUTION INTRAVENOUS at 20:01

## 2022-06-30 RX ADMIN — LEVOTHYROXINE SODIUM 175 MCG: 100 TABLET ORAL at 05:03

## 2022-06-30 RX ADMIN — BENZTROPINE MESYLATE 1 MG: 0.5 TABLET ORAL at 09:12

## 2022-06-30 NOTE — OP NOTE
OPERATIVE REPORT  PATIENT NAME: Carlos Benitez    :  1985  MRN: [id number]  Pt Location: [facility] OR ROOM 02    SURGERY DATE: 2022    Surgeon(s) and Role: * Sean Ackerman DO - Primary     * Kelly Lopes PA-C - Assisting    Preop Diagnosis:  Acute appendicitis [K35 80]    Post-Op Diagnosis Codes:     * Acute appendicitis [K35 80]    Procedure(s) (LRB):  APPENDECTOMY LAPAROSCOPIC, possible open (N/A)    Specimen(s):  ID Type Source Tests Collected by Time Destination   1 : APPENDIX Tissue Appendix TISSUE EXAM [place] AckermanDO 2022        Estimated Blood Loss:   Minimal    Drains:  Closed/Suction Drain Medial LLQ Bulb 19 Fr  (Active)   Number of days: 0       Anesthesia Type:   General    Operative Indications:  Acute appendicitis [K35 80]      Operative Findings:  Acute perforated gangrenous appendicitis  Dirty infected case  Complications:   None    Procedure and Technique:  Patient brought to operating and placed in supine position operating table  All the regular monitor devices were then connected  The patient underwent general seizure with endotracheal intubation without complication  Patient received preoperative antibiotics  The patient received bilateral lower extremity sequential compressive devices for DVT prophylaxis  Patient then prepped draped usual sterile fashion  Time-out was performed to verify correct patient, procedure, position, site  A supraumbilical vertical incision was made with a 15 blade scalpel through the skin down to the subcutaneous tissue  Bovie electrocautery was then used for further dissection through the subcutaneous fat  Dissection continued using S retractors until the fascia was visualized  The fascia was then grasped 2 Margurite Seip is elevated and incised with Bovie electrocautery  Two stay sutures of 0 Vicryl then placed  Using finger fracture technique the underlying peritoneum was incised    The underside of peritoneal was then palpated was smooth with no evidence of any adhesion or bowel the way  A Herson trocar was then placed under direct vision  The abdomen was then insufflated with carbon dioxide to a pressure of 12-14 mmHg  The patient tolerated insufflation well  Laparoscopic was inserted the abdomen is inspected there was no injury noted  Two additional trocars each 5 mm were then placed in the suprapubic and left lower quadrant care was taken make sure that the bladder and the inferior epigastric vessels were not injured  Patient then placed in Trendelenburg with the right side up  The right lower quadrant was then visualized in the omentum was gently removed and placed cephalad  There appeared to be: Which was inflamed along with terminal ileum  The tinea within visualized and followed proximally until the cecum was identified  The cecum was grasped and the noted be a evidence of a inflamed base of the appendix  The terminal ileum was then visualized appeared to be somewhat inflamed the likely reactive from the appendicitis  The terminal ileum was then gently grasped and brought bluntly retracted medially  When this happened there is a rash of murky/feculent fluid  This was coming from the distal appendix which at this point appeared to be perforated but had been sealed by the terminal ileum  The appendix was then densely adhered to the pelvic sidewall  Using combination of blunt sharp technique the terminal ileum was gently dissected free of the underlying appendix  Appendix was then grasped and elevated and then is also dissected free of the pelvic sidewall  In doing so there is an additional perforation noted along the mid portion of the appendix  Small fecaliths were noted  Further dissection allowed for freeing of the cecum and the base was visualized which appeared to be somewhat gangrenous  The EnSeal device was then used to take down the mesoappendix    The base the appendix was then gently grasped a window was made in the mesoappendix at the base  In doing so a small tear the appendix was made which was unavoidable given the friability and granular state of the appendix  Once a window was then made the remaining portion the appendix were taken down using the EnSeal device the base was then elevated using Endo-CLOTILDE with a blue load stapling device came across the base the appendix which was still intact  The staple line was inspected appeared to be intact no evidence of entry into the cecum  There is no leakage of any feculent material   There was no active bleeding  The appendix was then placed in Endo-Catch bag  The right pericolic gutter was then irrigated with normal saline  Attention was then turned to the pelvis  The pelvis was noted to have some murky/purulent fluid  This was aspirated and gentle irrigation until the pelvis was clear  A 19 [place] drain was then placed through the suprapubic port site and terminated in the right pericolic gutter  The staple line was then once again inspected appeared to be intact  The omentum was then gently placed over the appendiceal stump  The remaining 5 mm trocar in the left lower quadrant was then removed under direct vision  The supraumbilical trocar was then removed along with the Endo-Catch bag  The fascia of the umbilical trocar site was then closed with 0 Vicryl suture  The skin of all trocars was then approximated using 4-0 Monocryl  Steri-Strips and dry sterile dressing were then placed a drain dressing was then placed over the suprapubic drain  The patient tolerated procedure well taken to the postanesthesia care in stable condition  All lap, needle, instrument counts were correct       I was present for the entire procedure, A qualified resident physician was not available and A physician assistant was required during the procedure for retraction tissue handling,dissection and suturing    Patient Disposition:  PACU       SIGNATURE: Odin Patricio DO Sergio  DATE: June 29, 2022  TIME: 9:29 PM

## 2022-06-30 NOTE — PLAN OF CARE
Problem: MOBILITY - ADULT  Goal: Maintain or return to baseline ADL function  Description: INTERVENTIONS:  -  Assess patient's ability to carry out ADLs; assess patient's baseline for ADL function and identify physical deficits which impact ability to perform ADLs (bathing, care of mouth/teeth, toileting, grooming, dressing, etc )  - Assess/evaluate cause of self-care deficits   - Assess range of motion  - Assess patient's mobility; develop plan if impaired  - Assess patient's need for assistive devices and provide as appropriate  - Encourage maximum independence but intervene and supervise when necessary  - Involve family in performance of ADLs  - Assess for home care needs following discharge   - Consider OT consult to assist with ADL evaluation and planning for discharge  - Provide patient education as appropriate  Outcome: Progressing     Problem: PAIN - ADULT  Goal: Verbalizes/displays adequate comfort level or baseline comfort level  Description: Interventions:  - Encourage patient to monitor pain and request assistance  - Assess pain using appropriate pain scale  - Administer analgesics based on type and severity of pain and evaluate response  - Implement non-pharmacological measures as appropriate and evaluate response  - Consider cultural and social influences on pain and pain management  - Notify physician/advanced practitioner if interventions unsuccessful or patient reports new pain  Outcome: Progressing     Problem: INFECTION - ADULT  Goal: Absence or prevention of progression during hospitalization  Description: INTERVENTIONS:  - Assess and monitor for signs and symptoms of infection  - Monitor lab/diagnostic results  - Monitor all insertion sites, i e  indwelling lines, tubes, and drains  - Monitor endotracheal if appropriate and nasal secretions for changes in amount and color  - [place] appropriate cooling/warming therapies per order  - Administer medications as ordered  - Instruct and encourage patient and family to use good hand hygiene technique  - Identify and instruct in appropriate isolation precautions for identified infection/condition  Outcome: Progressing     Problem: SAFETY ADULT  Goal: Maintain or return to baseline ADL function  Description: INTERVENTIONS:  -  Assess patient's ability to carry out ADLs; assess patient's baseline for ADL function and identify physical deficits which impact ability to perform ADLs (bathing, care of mouth/teeth, toileting, grooming, dressing, etc )  - Assess/evaluate cause of self-care deficits   - Assess range of motion  - Assess patient's mobility; develop plan if impaired  - Assess patient's need for assistive devices and provide as appropriate  - Encourage maximum independence but intervene and supervise when necessary  - Involve family in performance of ADLs  - Assess for home care needs following discharge   - Consider OT consult to assist with ADL evaluation and planning for discharge  - Provide patient education as appropriate  Outcome: Progressing     Problem: DISCHARGE PLANNING  Goal: Discharge to home or other facility with appropriate resources  Description: INTERVENTIONS:  - Identify barriers to discharge w/patient and caregiver  - Arrange for needed discharge resources and transportation as appropriate  - Identify discharge learning needs (meds, wound care, etc )  - Arrange for interpretive services to assist at discharge as needed  - Refer to Case Management Department for coordinating discharge planning if the patient needs post-hospital services based on physician/advanced practitioner order or complex needs related to functional status, cognitive ability, or social support system  Outcome: Progressing     Problem: Knowledge Deficit  Goal: Patient/family/caregiver demonstrates understanding of disease process, treatment plan, medications, and discharge instructions  Description: Complete learning assessment and assess knowledge base   Interventions:  - Provide teaching at level of understanding  - Provide teaching via preferred learning methods  Outcome: Progressing

## 2022-06-30 NOTE — PROGRESS NOTES
Progress Note - General Surgery   Heidi Crocker 29 y o  male MRN: [id number]  Unit/Bed#: -01 Encounter: [id number]    Assessment:  30yo M POD#1 s/p laparoscopic appendectomy  - per operative report patient was found to have acute perforated, gangrenous appendicitis  - afebrile, VSS  - leukocytosis down trending, WBC 12 from 16 9  - hemoglobin stable  - CHRISTINE:  55 cc bloody output  - tolerating clear liquid diet    Plan:  - advance to fulls, T/C - monitor for toleration   - continue IV antibiotic (Zosyn)  - p r n  analgesia/antiemetics  - CHRISTINE to bulb suction  - encourage incentive spirometry  - encourage OOB/ambulation  - strict I/Os  - trend a m  labs, monitor vitals  - dispo:  Potential discharge home tomorrow if patient continues to clinically improve  Subjective/Objective   Subjective:  Patient underwent laparoscopic appendectomy with Dr Ivan Hung last evening  Today he states he feels well  Pain greatly improved since time of presentation  Tolerating clear liquid diet without nausea/vomiting  Denies chest pain, SOB, palpitations, pain/swelling in extremities  Objective:   Blood pressure 120/70, pulse 95, temperature 97 9 °F (36 6 °C), temperature source Temporal, resp  rate 18, height 6' 3" (1 905 m), weight 100 kg (220 lb 7 4 oz), SpO2 93 %  ,Body mass index is 27 56 kg/m²        Intake/Output Summary (Last 24 hours) at 6/30/2022 0805  Last data filed at 6/30/2022 0500  Gross per 24 hour   Intake 1825 ml   Output 155 ml   Net 1670 ml       Invasive Devices  Report    Peripheral Intravenous Line  Duration           Peripheral IV 06/29/22 Left Hand <1 day          Drain  Duration           Closed/Suction Drain Medial LLQ Bulb 19 Fr  <1 day                Physical Exam: /70   Pulse 95   Temp 97 9 °F (36 6 °C) (Temporal)   Resp 18   Ht 6' 3" (1 905 m)   Wt 100 kg (220 lb 7 4 oz)   SpO2 93%   BMI 27 56 kg/m²   General appearance: alert and oriented, in no acute distress, appears stated age and cooperative  Head: Normocephalic, without obvious abnormality, atraumatic  Lungs: clear to auscultation bilaterally  Heart: regular rate and rhythm, S1, S2 normal, no murmur, click, rub or gallop  Abdomen: soft, nondistended, appropriately tender to palpation, Incisons C/D/I with 2x2 and gauze in place, CHRISTINE drian in suprapubic incision with 55cc bloody output in bulb  Extremities: extremities normal, warm and well-perfused; no cyanosis, clubbing, or edema    Lab, Imaging and other studies:  I have personally reviewed pertinent lab results  , CBC:   Lab Results   Component Value Date    WBC 12 13 (H) 06/30/2022    HGB 13 8 06/30/2022    HCT 42 1 06/30/2022    MCV 92 06/30/2022     06/30/2022    MCH 30 0 06/30/2022    MCHC 32 8 06/30/2022    RDW 12 8 06/30/2022    MPV 9 8 06/30/2022    NRBC 0 06/30/2022   , CMP:   Lab Results   Component Value Date    SODIUM 132 (L) 06/30/2022    K 3 6 06/30/2022     06/30/2022    CO2 23 06/30/2022    BUN 13 06/30/2022    CREATININE 1 11 06/30/2022    CALCIUM 8 1 (L) 06/30/2022    AST 30 06/29/2022    ALT 52 06/29/2022    ALKPHOS 58 06/29/2022    EGFR 85 06/30/2022     VTE Pharmacologic Prophylaxis: Enoxaparin (Lovenox)  VTE Mechanical Prophylaxis: sequential compression device      Maria Luisa Oro PA-C  06/30/22    **Please Note: This note may have been constructed using a voice recognition system  **

## 2022-06-30 NOTE — ANESTHESIA PREPROCEDURE EVALUATION
Procedure:  APPENDECTOMY LAPAROSCOPIC, possible open (N/A Abdomen)    Acute appendicitis    ECG: NSR    Denies the following: CP/SOB with exertion, asthma, COPD, ARI, stroke/TIA, seizure      Relevant Problems   ENDO   (+) Hypothyroidism      NEURO/PSYCH   (+) Schizophrenia (HCC)        Physical Exam    Airway    Mallampati score: II  TM Distance: >3 FB  Neck ROM: full     Dental       Cardiovascular      Pulmonary      Other Findings        Anesthesia Plan  ASA Score- 2 Emergent    Anesthesia Type- general with ASA Monitors  Additional Monitors:   Airway Plan: ETT  Plan Factors-Exercise tolerance (METS): >4 METS  Chart reviewed  EKG reviewed  Existing labs reviewed  Patient summary reviewed  Patient is a current smoker  Obstructive sleep apnea risk education given perioperatively  Induction- intravenous and rapid sequence induction  Postoperative Plan-     Informed Consent- Anesthetic plan and risks discussed with patient  I personally reviewed this patient with the CRNA  Discussed and agreed on the Anesthesia Plan with the CRNA  Jaime Peralse

## 2022-06-30 NOTE — ANESTHESIA POSTPROCEDURE EVALUATION
Post-Op Assessment Note    CV Status:  Stable  Pain Score: 0    Pain management: adequate     Mental Status:  Alert and awake   Hydration Status:  Euvolemic   PONV Controlled:  Controlled   Airway Patency:  Patent      Post Op Vitals Reviewed: Yes      Staff: CRNA, Anesthesiologist         No complications documented      BP   134/81   Temp   99 4   Pulse  100   Resp   20   SpO2   94

## 2022-06-30 NOTE — CASE MANAGEMENT
Case Management Assessment    Patient name Stephanie Metzger  Location /-52 MRN [id number]  : 1985 Date 2022       Current Admission Date: 2022  Current Admission Diagnosis:Appendicitis   Patient Active Problem List    Diagnosis Date Noted    Appendicitis 2022    Acne 2022    [facility] 2021    Hypothyroidism 2021    Need for lipid screening 2021    Screening for diabetes mellitus 2021    Schizoaffective disorder, bipolar type ([facility] ) 2018    Amphetamine abuse ([facility] ) 2018      LOS (days): 0  Geometric Mean LOS (GMLOS) (days):   Days to GMLOS:     OBJECTIVE:              Current admission status: Observation       Preferred Pharmacy:   [facility] #[id number]- [address]QU[address] - [address], [address]  [address]  [address] [facility], [address] [place] [address]  Phone: [phone] Fax: [phone] [address], [address] [facility] [address]  [address]  [address] [address]  Phone: [phone] Fax: [phone]    Primary Care Provider: Estefania Leal MD    Primary Insurance: [facility]  Secondary Insurance:     ASSESSMENT:  Aleksandar Cotto Proxies    There are no active Health Care Proxies on file  Patient Information  Admitted from[de-identified] Home  Mental Status: Alert  During Assessment patient was accompanied by: Not accompanied during assessment  Assessment information provided by[de-identified] Patient  Primary Caregiver: Self  Support Systems: Family members  Home entry access options   Select all that apply : No steps to enter home  Type of Current Residence: [place]  Homeless/housing insecurity resource given?: N/A  Living Arrangements: Other (Comment) (lives with brother and sister in law)    Activities of Daily Living Prior to Admission  Functional Status: Independent  Completes ADLs independently?: Yes  Ambulates independently?: Yes  Does patient use assisted devices?: No  Does patient currently own DME?: No  Does patient have a history of Outpatient Therapy (PT/OT)?: No  Does the patient have a history of Short-Term Rehab?: No  Does patient have a history of HHC?: No  Does patient currently have [facility]?: No    Patient Information Continued  Income Source: Employed  Food insecurity resource given?: N/A  Does patient receive dialysis treatments?: No  Does patient have a history of substance abuse?: Yes (4 years sober)  Historical substance use preference: Amphetamines  Is patient currently in treatment for substance abuse?: N/A - sober  Does patient have a history of Mental Health Diagnosis?: Yes (Schizoaffective disorder, bipolar type)  Is patient receiving treatment for mental health?: Yes  Has patient received inpatient treatment related to mental health in the last 2 years?: No (4 years ago)    Means of Transportation  Means of Transport to Appts[de-identified] Family transport  Was application for public transport provided?: N/A        Pt is independent with ADLs, lives with his brother and sister in law  They provide transport to doctors appointments  Pt has a history using meth, and has been four years sober  He sees a therapist, and has been inpatient for psych treatment 4 years ago  No history of STR, or HHC  Family will transport home once stable for DC

## 2022-06-30 NOTE — UTILIZATION REVIEW
OBSERVATION 6/29/22 @ [facility] 6/30 22 @ 1535 DUE TO ACUTE PERFORATED GANGRENOUS APPENDICITIS REQUIRING CONTINUED IV ABX  Initial Clinical Review    Admission: Date/Time/Statement:   06/30/22 1536  Inpatient Admission  Once        Transfer Service: Surgery-General       Question Answer Comment   Level of Care Med Surg    Estimated length of stay More than 2 Midnights    Certification I certify that inpatient services are medically necessary for this patient for a duration of greater than two midnights  See H&P and MD Progress Notes for additional information about the patient's course of treatment  06/30/22 1535         ED Arrival Information     Expected   -    Arrival   6/29/2022 17:36    Acuity   Urgent            Means of arrival   Walk-In    Escorted by   Friend    Service   Surgery-General    Admission type   Urgent            Arrival complaint   Acute appendicictis as per CT scan           Chief Complaint   Patient presents with    Flank Pain     Reports flank pain, sent over from CT  Initial Presentation: 29 y o  male to the ED from PCP with complaints of right lower quadrant abdominal pain, fever  Admitted under observation then converted to inpatient for acute appendicitis  He has had right lower abdominal pain for 2 days  ON arrival, WBCs elevated  CT shows acute appendicitis  Admitted under gen/surg service  In the ED, given Iv fentanyl, started on IV abx, iv fluids  OPERATIVE NOTE:  SURGERY DATE: 6/29/2022    Procedure(s) (LRB):  APPENDECTOMY LAPAROSCOPIC, possible open (N/A)  Anesthesia Type:   General  Operative Findings:  Acute perforated gangrenous appendicitis  Dirty infected case  Date: 6/30   Day 2:   WBCs improving  Continue with IV abx  CHRISTINE to bulb suction  Tolerating clear liquids  7/1 UPDate: Advance to full liquid diet  Continue iv fluids  Continue iv abx  Monitor CHRISTINE drain output  Ambulate     ED Triage Vitals   Temperature Pulse Respirations Blood Pressure SpO2   06/29/22 1858 06/29/22 1858 06/29/22 1858 06/29/22 1858 06/29/22 1858   99 °F (37 2 °C) 98 18 110/70 98 %      Temp Source Heart Rate Source Patient Position - Orthostatic VS BP Location FiO2 (%)   06/29/22 1858 06/29/22 1858 06/29/22 1858 06/29/22 1858 --   Tympanic Monitor Sitting Left arm       Pain Score       06/29/22 1957       7          Wt Readings from Last 1 Encounters:   06/29/22 100 kg (220 lb 7 4 oz)     Additional Vital Signs:   Date/Time Temp Pulse Resp BP MAP (mmHg) SpO2 Calculated FIO2 (%) - Nasal Cannula O2 Flow Rate (L/min) Nasal Cannula O2 Flow Rate (L/min) O2 Device Cardiac (WDL) Patient Position - Orthostatic VS   07/01/22 07:33:45 98 6 °F (37 °C) 103 19 123/73 90 94 % 28 -- 2 L/min Nasal cannula -- --   07/01/22 03:18:40 98 1 °F (36 7 °C) 104 18 111/72 85 95 % -- -- -- -- -- --   07/01/22 01:53:54 97 9 °F (36 6 °C) 103 -- -- -- 90 % -- -- -- --         Date/Time Temp Pulse Resp BP MAP (mmHg) SpO2 Calculated FIO2 (%) - Nasal Cannula O2 Flow Rate (L/min) Nasal Cannula O2 Flow Rate (L/min) O2 Device Cardiac (WDL) Patient Position - Orthostatic VS   06/30/22 07:16:26 97 9 °F (36 6 °C) 95 18 120/70 87 93 % -- -- -- -- -- --   06/30/22 0500 -- 88 -- -- -- 94 % -- -- -- -- -- --   06/30/22 0400 -- 85 -- -- -- 93 % -- -- -- -- -- --   06/30/22 0300 -- 93 -- -- -- 93 % -- -- -- -- -- --   06/30/22 0200 -- 99 -- -- -- 94 % -- -- -- -- -- --   06/30/22 0100 -- 102 -- -- -- 93 % 32 -- 3 L/min -- -- --   06/30/22 0000 -- 93 -- -- -- 94 % -- -- -- -- -- --   06/29/22 2304 98 8 °F (37 1 °C) 88 20 145/76 72 94 % 28 -- 2 L/min Nasal cannula -- Lying   06/29/22 23:01:47 -- 84 -- -- -- 94 % -- -- -- -- -- --   06/29/22 2300 -- 88 -- -- -- 93 % -- -- -- -- -- --   06/29/22 2215 -- -- -- 145/79 -- -- -- -- -- -- -- --   06/29/22 2213 100 °F (37 8 °C) -- -- -- -- -- -- -- -- -- -- --   06/29/22 2211 -- -- -- -- -- -- 28 -- 2 L/min Nasal cannula -- --   06/29/22 2210 -- 87 20 103/49 Abnormal  70 97 % 28 -- 2 L/min Nasal cannula -- --   06/29/22 2207 -- -- -- -- -- 91 % 28 -- 2 L/min Nasal cannula -- --   06/29/22 2200 -- 96 22 128/84 101 98 % -- -- -- None (Room air) -- --   06/29/22 2158 -- -- -- -- -- 98 % -- -- -- None (Room air) -- --   06/29/22 2150 -- 97 22 127/69 -- 96 % -- 6 L/min -- Simple mask -- --   06/29/22 2148 -- -- -- -- -- 98 % -- 6 L/min -- Simple mask -- --   06/29/22 2145 99 4 °F (37 4 °C) 98 15 134/81 102 89 % Abnormal  -- 6 L/min -- Simple mask WDL --   06/29/22 1902 99 6 °F (37 6 °C) 94 18 118/69 -- 99 % -- -- -- None (Room air) -- Lying   06/29/22 1858 99 °F (37 2 °C) 98 18 110/70 -- 98 % -- -- -- None (Room air) -- Sitting       Pertinent Labs/Diagnostic Test Results:     6/29 CT A/P:Findings compatible with acute onset of appendicitis associated with possible adjacent reactive terminal ileitis      Results from last 7 days   Lab Units 07/01/22 0434 06/30/22  0500 06/29/22  1834   WBC Thousand/uL 10 15 12 13* 16 91*   HEMOGLOBIN g/dL 12 9 13 8 15 3   HEMATOCRIT % 39 3 42 1 46 0   PLATELETS Thousands/uL 182 198 220   NEUTROS ABS Thousands/µL 8 52* 10 68* 14 18*         Results from last 7 days   Lab Units 07/01/22 0434 06/30/22  0500 06/29/22  1834   SODIUM mmol/L 132* 132* 133*   POTASSIUM mmol/L 3 7 3 6 3 6   CHLORIDE mmol/L 100 100 99   CO2 mmol/L 26 23 23   ANION GAP mmol/L 6 9 11   BUN mg/dL 11 13 15   CREATININE mg/dL 0 98 1 11 1 24   EGFR ml/min/1 73sq m 99 85 74   CALCIUM mg/dL 8 2* 8 1* 9 2   MAGNESIUM mg/dL  --  1 9  --      Results from last 7 days   Lab Units 06/29/22  1834   AST U/L 30   ALT U/L 52   ALK PHOS U/L 58   TOTAL PROTEIN g/dL 7 3   ALBUMIN g/dL 4 1   TOTAL BILIRUBIN mg/dL 0 64         Results from last 7 days   Lab Units 07/01/22  0434 06/30/22  0500 06/29/22  1834   GLUCOSE RANDOM mg/dL 121 113 91       Results from last 7 days   Lab Units 06/29/22  1901   PROTIME seconds 14 2   INR  1 11   PTT seconds 34         Results from last 7 days Lab Units 06/29/22  1834   PROCALCITONIN ng/ml 0 38*     Results from last 7 days   Lab Units 06/29/22  1834   LACTIC ACID mmol/L 0 8         Results from last 7 days   Lab Units 06/29/22  1622   GLUCOSE UA  neg   KETONES UA  trace   BLOOD UA  neg   PROTEIN UA  30 mg/dl   NITRITE UA  neg   BILIRUBIN UA POC  small   UROBILINOGEN UA  0 2 eu/dl   LEUKOCYTES UA  neg         Results from last 7 days   Lab Units 06/29/22  1835 06/29/22  1834   BLOOD CULTURE  No Growth at 24 hrs  No Growth at 24 hrs         ED Treatment:   Medication Administration from 06/29/2022 1736 to 06/29/2022 2004       Date/Time Order Dose Route Action     06/29/2022 1904 piperacillin-tazobactam (ZOSYN) IVPB 4 5 g 4 5 g Intravenous New Bag     06/29/2022 1902 sodium chloride 0 9 % bolus 1,000 mL 1,000 mL Intravenous New Bag     06/29/2022 1957 fentanyl citrate (PF) 100 MCG/2ML 50 mcg 50 mcg Intravenous Given        Past Medical History:   Diagnosis Date    Anxiety     [facility] disorder [facility] )     Depression     Disease of thyroid gland     [facility]     ETOH abuse     Hallucination     Psychiatric illness     Psychosis [facility]     Schizoaffective disorder, bipolar type [facility] ) 6/30/2018    Schizophrenia ([facility] )      Admitting Diagnosis: Appendicitis [K37]  Acute appendicitis [K35 80]  Age/Sex: 29 y o  male  Admission Orders:  CBC, BMp  UA with reflex to micro  SCDS    Scheduled Medications:  benztropine, 1 mg, Oral, BID  enoxaparin, 40 mg, Subcutaneous, Daily  levothyroxine, 175 mcg, Oral, Early Morning  OXcarbazepine, 600 mg, Oral, Q12H AISLINN  piperacillin-tazobactam, 3 375 g, Intravenous, Q6H  risperiDONE, 3 mg, Oral, BID      Continuous IV Infusions:  lactated ringers, 100 mL/hr, Intravenous, Continuous      PRN Meds:  acetaminophen, 650 mg, Oral, Q6H PRN  aluminum-magnesium hydroxide-simethicone, 30 mL, Oral, Q4H PRN  HYDROmorphone, 0 5 mg, Intravenous, Q3H PRN  morphine injection, 4 mg, Intravenous, Q3H PRN x2  ondansetron, 4 mg, Intravenous, Q6H PRN  oxyCODONE, 10 mg, Oral, Q6H PRN        IP CONSULT TO CASE MANAGEMENT    Network Utilization Review Department  ATTENTION: Please call with any questions or concerns to [phone] and carefully listen to the prompts so that you are directed to the right person  All voicemails are confidential   Pepe Barnard all requests for admission clinical reviews, approved or denied determinations and any other requests to dedicated fax number below belonging to the campus where the patient is receiving treatment   List of dedicated fax numbers for the Facilities:  [address] DENIALS (Administrative/Medical Necessity) [phone]   [address] (Maternity/NICU/Pediatrics) [phone]   [address]  [address] [facility] [place] [address] [address] [address] [facility] [place] [address] [phone]

## 2022-07-01 LAB
ANION GAP SERPL CALCULATED.3IONS-SCNC: 6 MMOL/L (ref 4–13)
BASOPHILS # BLD AUTO: 0.03 THOUSANDS/ΜL (ref 0–0.1)
BASOPHILS NFR BLD AUTO: 0 % (ref 0–1)
BUN SERPL-MCNC: 11 MG/DL (ref 5–25)
CALCIUM SERPL-MCNC: 8.2 MG/DL (ref 8.4–10.2)
CHLORIDE SERPL-SCNC: 100 MMOL/L (ref 96–108)
CO2 SERPL-SCNC: 26 MMOL/L (ref 21–32)
CREAT SERPL-MCNC: 0.98 MG/DL (ref 0.6–1.3)
EOSINOPHIL # BLD AUTO: 0.01 THOUSAND/ΜL (ref 0–0.61)
EOSINOPHIL NFR BLD AUTO: 0 % (ref 0–6)
ERYTHROCYTE [DISTWIDTH] IN BLOOD BY AUTOMATED COUNT: 12.7 % (ref 11.6–15.1)
GFR SERPL CREATININE-BSD FRML MDRD: 99 ML/MIN/1.73SQ M
GLUCOSE SERPL-MCNC: 121 MG/DL (ref 65–140)
HCT VFR BLD AUTO: 39.3 % (ref 36.5–49.3)
HGB BLD-MCNC: 12.9 G/DL (ref 12–17)
IMM GRANULOCYTES # BLD AUTO: 0.02 THOUSAND/UL (ref 0–0.2)
IMM GRANULOCYTES NFR BLD AUTO: 0 % (ref 0–2)
LYMPHOCYTES # BLD AUTO: 0.81 THOUSANDS/ΜL (ref 0.6–4.47)
LYMPHOCYTES NFR BLD AUTO: 8 % (ref 14–44)
MCH RBC QN AUTO: 29.9 PG (ref 26.8–34.3)
MCHC RBC AUTO-ENTMCNC: 32.8 G/DL (ref 31.4–37.4)
MCV RBC AUTO: 91 FL (ref 82–98)
MONOCYTES # BLD AUTO: 0.76 THOUSAND/ΜL (ref 0.17–1.22)
MONOCYTES NFR BLD AUTO: 8 % (ref 4–12)
NEUTROPHILS # BLD AUTO: 8.52 THOUSANDS/ΜL (ref 1.85–7.62)
NEUTS SEG NFR BLD AUTO: 84 % (ref 43–75)
NRBC BLD AUTO-RTO: 0 /100 WBCS
PLATELET # BLD AUTO: 182 THOUSANDS/UL (ref 149–390)
PMV BLD AUTO: 9.8 FL (ref 8.9–12.7)
POTASSIUM SERPL-SCNC: 3.7 MMOL/L (ref 3.5–5.3)
RBC # BLD AUTO: 4.31 MILLION/UL (ref 3.88–5.62)
SODIUM SERPL-SCNC: 132 MMOL/L (ref 135–147)
WBC # BLD AUTO: 10.15 THOUSAND/UL (ref 4.31–10.16)

## 2022-07-01 PROCEDURE — 85025 COMPLETE CBC W/AUTO DIFF WBC: CPT | Performed by: PHYSICIAN ASSISTANT

## 2022-07-01 PROCEDURE — 99024 POSTOP FOLLOW-UP VISIT: CPT

## 2022-07-01 PROCEDURE — 80048 BASIC METABOLIC PNL TOTAL CA: CPT | Performed by: PHYSICIAN ASSISTANT

## 2022-07-01 RX ORDER — CALCIUM CARBONATE 200(500)MG
500 TABLET,CHEWABLE ORAL 2 TIMES DAILY PRN
Status: DISCONTINUED | OUTPATIENT
Start: 2022-07-01 | End: 2022-07-02 | Stop reason: HOSPADM

## 2022-07-01 RX ORDER — SODIUM CHLORIDE 9 MG/ML
50 INJECTION, SOLUTION INTRAVENOUS CONTINUOUS
Status: DISCONTINUED | OUTPATIENT
Start: 2022-07-01 | End: 2022-07-02 | Stop reason: HOSPADM

## 2022-07-01 RX ADMIN — Medication 3.38 G: at 12:58

## 2022-07-01 RX ADMIN — Medication 3.38 G: at 19:30

## 2022-07-01 RX ADMIN — BENZTROPINE MESYLATE 1 MG: 0.5 TABLET ORAL at 09:33

## 2022-07-01 RX ADMIN — Medication 3.38 G: at 01:55

## 2022-07-01 RX ADMIN — Medication 3.38 G: at 06:32

## 2022-07-01 RX ADMIN — BENZTROPINE MESYLATE 1 MG: 0.5 TABLET ORAL at 18:23

## 2022-07-01 RX ADMIN — SODIUM CHLORIDE 50 ML/HR: 0.9 INJECTION, SOLUTION INTRAVENOUS at 14:28

## 2022-07-01 RX ADMIN — LEVOTHYROXINE SODIUM 175 MCG: 100 TABLET ORAL at 06:32

## 2022-07-01 RX ADMIN — OXCARBAZEPINE 600 MG: 600 TABLET, FILM COATED ORAL at 09:33

## 2022-07-01 RX ADMIN — ENOXAPARIN SODIUM 40 MG: 100 INJECTION SUBCUTANEOUS at 09:33

## 2022-07-01 RX ADMIN — OXYCODONE HYDROCHLORIDE 10 MG: 10 TABLET ORAL at 09:33

## 2022-07-01 RX ADMIN — RISPERIDONE 3 MG: 1 TABLET ORAL at 09:33

## 2022-07-01 RX ADMIN — SODIUM CHLORIDE, SODIUM LACTATE, POTASSIUM CHLORIDE, AND CALCIUM CHLORIDE 100 ML/HR: .6; .31; .03; .02 INJECTION, SOLUTION INTRAVENOUS at 12:01

## 2022-07-01 RX ADMIN — OXCARBAZEPINE 600 MG: 600 TABLET, FILM COATED ORAL at 20:54

## 2022-07-01 RX ADMIN — CALCIUM CARBONATE (ANTACID) CHEW TAB 500 MG 500 MG: 500 CHEW TAB at 09:32

## 2022-07-01 RX ADMIN — RISPERIDONE 3 MG: 1 TABLET ORAL at 18:21

## 2022-07-01 NOTE — UTILIZATION REVIEW
Inpatient Admission Authorization Request   NOTIFICATION OF INPATIENT ADMISSION/INPATIENT AUTHORIZATION REQUEST   SERVICING FACILITY:   [place] [address]   [address] [place] [address], [address] [place] [address], 130 [facility]  Tax ID: [id number]  NPI: [id number]  Place of Service: Inpatient [address]  Place of Service Code: 24     ATTENDING PROVIDER:  Attending Name and NPI#: Beatrice Espinalid [[id number]]  Address: [address] [place] [address], 130 [facility]  Phone: [phone]     UTILIZATION REVIEW CONTACT:  Essie Hernandes, Utilization   Network Utilization Review Department  Phone: [phone]  Fax [phone]  Email: [email or web address]     PHYSICIAN ADVISORY SERVICES:  FOR ILFV-BY-KXJC REVIEW - MEDICAL NECESSITY DENIAL  Phone: [phone]  Fax: [phone]  Email: [email or web address]     TYPE OF REQUEST:  Inpatient Status     ADMISSION INFORMATION:  ADMISSION DATE/TIME: 6/30/22  3:35 PM  PATIENT DIAGNOSIS CODE/DESCRIPTION:  Appendicitis [K37]  Acute appendicitis [K35 80]  DISCHARGE DATE/TIME: No discharge date for patient encounter  IMPORTANT INFORMATION:  Please contact the Essie Hernandes directly with any questions or concerns regarding this request  Department voicemails are confidential     Send requests for admission clinical reviews, concurrent reviews, approvals, and administrative denials due to lack of clinical to fax [phone]

## 2022-07-01 NOTE — PROGRESS NOTES
Progress Note - General Surgery   Albert Kaur 29 y o  male MRN: [id number]  Unit/Bed#: -01 Encounter: [id number]    Assessment:  29 y o  male POD#2 s/p laparoscopic appendectomy  Sepsis, POA in the setting of acute perforated, gangrenous appendicitis, a/e/b, tachycardia HR  and WBC 16 91, treated with IV Zosyn and laparoscopic appendectomy  - Afebrile  - mild tachycardia 100-104   - 2L o2 overnight   - WBC 10 15 from 12 13   - drain output: 135 serous   - patient with increased nausea this morning   - on exam, abdomen soft with majority of TTP in RLQ    Plan:  - Full liquid diet  - IVF @ 50 mL/hr until adequate PO intake  - Continue IV Zosyn while inpatient; will transition to PO Augmentin when ready for discharge for a total of 8 day course of abx  - Monitor abdominal exams, vitals, and labs  - Monitor CHRISTINE drain output and appearance of output  - TUMs added prn indigestion   - Analgesia and antiemetics prn   - Encourage ambulation   - Incentive spirometer   - DVT ppx     Subjective/Objective     Subjective: Patient with increased nausea after breakfast this morning  States that he vomited in his mouth  States he has a decreased appetite overall  Abdominal pain present but improving  States he has felt warm at times but remains afebrile  States that he has multiple episodes of diarrhea, without noticing foul smell  Objective:     Blood pressure 123/73, pulse 103, temperature 98 6 °F (37 °C), resp  rate 19, height 6' 3" (1 905 m), weight 100 kg (220 lb 7 4 oz), SpO2 94 %  ,Body mass index is 27 56 kg/m²        Intake/Output Summary (Last 24 hours) at 7/1/2022 0901  Last data filed at 7/1/2022 0659  Gross per 24 hour   Intake 2485 ml   Output 1745 ml   Net 740 ml       Invasive Devices  Report    Peripheral Intravenous Line  Duration           Peripheral IV 06/29/22 Left Hand 1 day          Drain  Duration           Closed/Suction Drain Medial LLQ Bulb 19 Fr  1 day                Physical Exam:  Gen - no acute distress   Heart - RRR  Lungs - Clear to auscultation b/l; somewhat shallow breathing  Abdomen - Bowel sounds present  Soft, non-distended, TTP in RLQ with minimal TTP in all other quadrants of abdomen  Drain in place with serous output  Incisions covered with gauze and tegaderm  Extremities - no lower extremity pitting edema  Lab, Imaging and other studies:I have personally reviewed pertinent lab results      VTE Pharmacologic Prophylaxis: Enoxaparin (Lovenox)    Janay Quan  7/1/2022

## 2022-07-01 NOTE — PLAN OF CARE
Problem: MOBILITY - ADULT  Goal: Maintain or return to baseline ADL function  Description: INTERVENTIONS:  -  Assess patient's ability to carry out ADLs; assess patient's baseline for ADL function and identify physical deficits which impact ability to perform ADLs (bathing, care of mouth/teeth, toileting, grooming, dressing, etc )  - Assess/evaluate cause of self-care deficits   - Assess range of motion  - Assess patient's mobility; develop plan if impaired  - Assess patient's need for assistive devices and provide as appropriate  - Encourage maximum independence but intervene and supervise when necessary  - Involve family in performance of ADLs  - Assess for home care needs following discharge   - Consider OT consult to assist with ADL evaluation and planning for discharge  - Provide patient education as appropriate  Outcome: Progressing     Problem: PAIN - ADULT  Goal: Verbalizes/displays adequate comfort level or baseline comfort level  Description: Interventions:  - Encourage patient to monitor pain and request assistance  - Assess pain using appropriate pain scale  - Administer analgesics based on type and severity of pain and evaluate response  - Implement non-pharmacological measures as appropriate and evaluate response  - Consider cultural and social influences on pain and pain management  - Notify physician/advanced practitioner if interventions unsuccessful or patient reports new pain  Outcome: Progressing     Problem: INFECTION - ADULT  Goal: Absence or prevention of progression during hospitalization  Description: INTERVENTIONS:  - Assess and monitor for signs and symptoms of infection  - Monitor lab/diagnostic results  - Monitor all insertion sites, i e  indwelling lines, tubes, and drains  - Monitor endotracheal if appropriate and nasal secretions for changes in amount and color  - [place] appropriate cooling/warming therapies per order  - Administer medications as ordered  - Instruct and encourage patient and family to use good hand hygiene technique  - Identify and instruct in appropriate isolation precautions for identified infection/condition  Outcome: Progressing     Problem: SAFETY ADULT  Goal: Maintain or return to baseline ADL function  Description: INTERVENTIONS:  -  Assess patient's ability to carry out ADLs; assess patient's baseline for ADL function and identify physical deficits which impact ability to perform ADLs (bathing, care of mouth/teeth, toileting, grooming, dressing, etc )  - Assess/evaluate cause of self-care deficits   - Assess range of motion  - Assess patient's mobility; develop plan if impaired  - Assess patient's need for assistive devices and provide as appropriate  - Encourage maximum independence but intervene and supervise when necessary  - Involve family in performance of ADLs  - Assess for home care needs following discharge   - Consider OT consult to assist with ADL evaluation and planning for discharge  - Provide patient education as appropriate  Outcome: Progressing     Problem: Knowledge Deficit  Goal: Patient/family/caregiver demonstrates understanding of disease process, treatment plan, medications, and discharge instructions  Description: Complete learning assessment and assess knowledge base    Interventions:  - Provide teaching at level of understanding  - Provide teaching via preferred learning methods  Outcome: Progressing

## 2022-07-01 NOTE — NURSING NOTE
Patient reports small amount of emesis with breakfast, swallowed it  Denies nausea  Abdomen tender, normoactive bowel sounds  Small amount of orange colored drainage in yoselyn drain  Pain level at 5 this AM   Call bell in reach

## 2022-07-01 NOTE — PLAN OF CARE
Problem: PAIN - ADULT  Goal: Verbalizes/displays adequate comfort level or baseline comfort level  Description: Interventions:  - Encourage patient to monitor pain and request assistance  - Assess pain using appropriate pain scale  - Administer analgesics based on type and severity of pain and evaluate response  - Implement non-pharmacological measures as appropriate and evaluate response  - Consider cultural and social influences on pain and pain management  - Notify physician/advanced practitioner if interventions unsuccessful or patient reports new pain  Outcome: Progressing     Problem: SAFETY ADULT  Goal: Patient will remain free of falls  Description: INTERVENTIONS:  -  Assess patient's ability to carry out ADLs; assess patient's baseline for ADL function and identify physical deficits which impact ability to perform ADLs (bathing, care of mouth/teeth, toileting, grooming, dressing, etc )  - Assess/evaluate cause of self-care deficits   - Assess range of motion  - Assess patient's mobility; develop plan if impaired  - Assess patient's need for assistive devices and provide as appropriate  - Encourage maximum independence but intervene and supervise when necessary  - Involve family in performance of ADLs  - Assess for home care needs following discharge   - Consider OT consult to assist with ADL evaluation and planning for discharge  - Provide patient education as appropriate  Outcome: Progressing

## 2022-07-02 VITALS
HEART RATE: 99 BPM | BODY MASS INDEX: 27.41 KG/M2 | WEIGHT: 220.46 LBS | HEIGHT: 75 IN | TEMPERATURE: 98.3 F | SYSTOLIC BLOOD PRESSURE: 113 MMHG | RESPIRATION RATE: 20 BRPM | OXYGEN SATURATION: 90 % | DIASTOLIC BLOOD PRESSURE: 73 MMHG

## 2022-07-02 LAB
ANION GAP SERPL CALCULATED.3IONS-SCNC: 10 MMOL/L (ref 4–13)
BASOPHILS # BLD AUTO: 0.02 THOUSANDS/ΜL (ref 0–0.1)
BASOPHILS NFR BLD AUTO: 0 % (ref 0–1)
BUN SERPL-MCNC: 10 MG/DL (ref 5–25)
CALCIUM SERPL-MCNC: 8.6 MG/DL (ref 8.4–10.2)
CHLORIDE SERPL-SCNC: 101 MMOL/L (ref 96–108)
CO2 SERPL-SCNC: 24 MMOL/L (ref 21–32)
CREAT SERPL-MCNC: 0.9 MG/DL (ref 0.6–1.3)
EOSINOPHIL # BLD AUTO: 0.08 THOUSAND/ΜL (ref 0–0.61)
EOSINOPHIL NFR BLD AUTO: 1 % (ref 0–6)
ERYTHROCYTE [DISTWIDTH] IN BLOOD BY AUTOMATED COUNT: 12.8 % (ref 11.6–15.1)
GFR SERPL CREATININE-BSD FRML MDRD: 110 ML/MIN/1.73SQ M
GLUCOSE SERPL-MCNC: 112 MG/DL (ref 65–140)
HCT VFR BLD AUTO: 40.3 % (ref 36.5–49.3)
HGB BLD-MCNC: 13.4 G/DL (ref 12–17)
IMM GRANULOCYTES # BLD AUTO: 0.02 THOUSAND/UL (ref 0–0.2)
IMM GRANULOCYTES NFR BLD AUTO: 0 % (ref 0–2)
LYMPHOCYTES # BLD AUTO: 1.14 THOUSANDS/ΜL (ref 0.6–4.47)
LYMPHOCYTES NFR BLD AUTO: 14 % (ref 14–44)
MCH RBC QN AUTO: 30.1 PG (ref 26.8–34.3)
MCHC RBC AUTO-ENTMCNC: 33.3 G/DL (ref 31.4–37.4)
MCV RBC AUTO: 91 FL (ref 82–98)
MONOCYTES # BLD AUTO: 0.7 THOUSAND/ΜL (ref 0.17–1.22)
MONOCYTES NFR BLD AUTO: 9 % (ref 4–12)
NEUTROPHILS # BLD AUTO: 5.98 THOUSANDS/ΜL (ref 1.85–7.62)
NEUTS SEG NFR BLD AUTO: 76 % (ref 43–75)
NRBC BLD AUTO-RTO: 0 /100 WBCS
PLATELET # BLD AUTO: 238 THOUSANDS/UL (ref 149–390)
PMV BLD AUTO: 10.1 FL (ref 8.9–12.7)
POTASSIUM SERPL-SCNC: 3.6 MMOL/L (ref 3.5–5.3)
RBC # BLD AUTO: 4.45 MILLION/UL (ref 3.88–5.62)
SODIUM SERPL-SCNC: 135 MMOL/L (ref 135–147)
WBC # BLD AUTO: 7.94 THOUSAND/UL (ref 4.31–10.16)

## 2022-07-02 PROCEDURE — 85025 COMPLETE CBC W/AUTO DIFF WBC: CPT

## 2022-07-02 PROCEDURE — 99024 POSTOP FOLLOW-UP VISIT: CPT | Performed by: PHYSICIAN ASSISTANT

## 2022-07-02 PROCEDURE — 80048 BASIC METABOLIC PNL TOTAL CA: CPT

## 2022-07-02 RX ORDER — AMOXICILLIN AND CLAVULANATE POTASSIUM 875; 125 MG/1; MG/1
1 TABLET, FILM COATED ORAL EVERY 12 HOURS SCHEDULED
Qty: 10 TABLET | Refills: 0 | Status: SHIPPED | OUTPATIENT
Start: 2022-07-02 | End: 2022-07-07

## 2022-07-02 RX ORDER — HYDROCODONE BITARTRATE AND ACETAMINOPHEN 5; 325 MG/1; MG/1
1 TABLET ORAL EVERY 6 HOURS PRN
Qty: 15 TABLET | Refills: 0 | Status: SHIPPED | OUTPATIENT
Start: 2022-07-02 | End: 2022-07-12

## 2022-07-02 RX ADMIN — ENOXAPARIN SODIUM 40 MG: 100 INJECTION SUBCUTANEOUS at 09:11

## 2022-07-02 RX ADMIN — RISPERIDONE 3 MG: 1 TABLET ORAL at 09:11

## 2022-07-02 RX ADMIN — BENZTROPINE MESYLATE 1 MG: 0.5 TABLET ORAL at 09:11

## 2022-07-02 RX ADMIN — Medication 3.38 G: at 06:02

## 2022-07-02 RX ADMIN — Medication 3.38 G: at 01:55

## 2022-07-02 RX ADMIN — OXCARBAZEPINE 600 MG: 600 TABLET, FILM COATED ORAL at 09:11

## 2022-07-02 RX ADMIN — LEVOTHYROXINE SODIUM 175 MCG: 100 TABLET ORAL at 06:02

## 2022-07-02 NOTE — NURSING NOTE
Reviewed AVS with patient who verbalized understanding  IV catheter removed and intact  Patient escorted out by PCA

## 2022-07-02 NOTE — DISCHARGE SUMMARY
Janessa 46  Discharge- Sandra Nunes 1985, 29 y o  male MRN: [id number]  Unit/Bed#: -01 Encounter: [id number]  Primary Care Provider: Audrey Lane MD   Date and time admitted to hospital: 6/29/2022  6:20 PM    * Appendicitis  Assessment & Plan  Acute appendicitis  Stable for d/c home today  Medical Problems             Resolved Problems  Date Reviewed: 1/20/2022   None                 Admission Date:   Admission Orders (From admission, onward)     Ordered        06/30/22 1535  Inpatient Admission  Once            06/29/22 1934  Place in Observation  Once                        Admitting Diagnosis: Appendicitis [K37]  Acute appendicitis [K35 80]    HPI: 30 yo M presenting with acute appendicitis  Procedures Performed: No orders of the defined types were placed in this encounter  Summary of Hospital Course: laparoscopic appendectomy performed, post-op convalesced for 2-3 days, on day of discharge was clinically, hemodynamically stable, drain removed, instructions reviewed, d/c home with [place] and Augmentin for 8 day course  Significant Findings, Care, Treatment and Services Provided: acute appendicitis    Complications: none    Condition at Discharge: stable         Discharge instructions/Information to patient and family:   See after visit summary for information provided to patient and family  Provisions for Follow-Up Care:  See after visit summary for information related to follow-up care and any pertinent home health orders  PCP: Audrey Lane MD    Disposition: Home    Planned Readmission: No    Discharge Statement   I spent 5 minutes discharging the patient  This time was spent on the day of discharge  I had direct contact with the patient on the day of discharge  Additional documentation is required if more than 30 minutes were spent on discharge       Discharge Medications:  See after visit summary for reconciled discharge medications provided to patient and family

## 2022-07-02 NOTE — CASE MANAGEMENT
Case Management Discharge Planning Note    Patient name Rani   Location /-58 MRN [id number]  : 1985 Date 2022       Current Admission Date: 2022  Current Admission Diagnosis:Appendicitis   Patient Active Problem List    Diagnosis Date Noted    Appendicitis 2022    Acne 2022    Schizophrenia ([facility] ) 2021    Hypothyroidism 2021    Need for lipid screening 2021    Screening for diabetes mellitus 2021    Schizoaffective disorder, bipolar type ([facility] ) 2018    Amphetamine abuse ([facility] ) 2018      LOS (days): 2  Geometric Mean LOS (GMLOS) (days): 2 40  Days to GMLOS:0 6     OBJECTIVE:  Risk of Unplanned Readmission Score: 11 66         Current admission status: Inpatient   Preferred Pharmacy:   [facility]/pharmacy #[id number]- [place] [address], [address]  [place]   [address] [address], [address] [facility]  Phone: [phone] Fax: [phone] [id number] 622 [facility] 330 [facility] [address]  [address]  [address] [address]  Phone: [phone] Fax: [phone]    Primary Care Provider: Eduardo Rodriges MD    Primary Insurance: Merged with [facility]  Secondary Insurance:     DISCHARGE DETAILS:    Discharge planning discussed with[de-identified] patient  Freedom of Choice: Yes  Comments - Freedom of Choice: pt requuested a note for work and rn was made aware  CM contacted family/caregiver?: Yes (pt stated no need to call his brother)  Were Treatment Team discharge recommendations reviewed with patient/caregiver?: Yes (reviewed with the pt)  Did patient/caregiver verbalize understanding of patient care needs?: Yes  Were patient/caregiver advised of the risks associated with not following Treatment Team discharge recommendations?: Yes         Requested  [address]         [facility]?: No    DME Referral Provided  Referral made for DME?: No    Other Referral/Resources/Interventions Provided:  Referral Comments: pt denied any d/c needs,  pt requested a note for work and it was obtained by the rn    Would you like to participate in our [address] service program?  : No - Declined    Treatment Team Recommendation: Home (home with famil and outpt follow up -family)  Discharge Destination Plan[de-identified] Home (home with family and outpt follow up)  Transport at Discharge : Automobile, Family      pt in agreement with the d/c and d/c plan                 Accompanied by: Family member

## 2022-07-02 NOTE — PLAN OF CARE
Problem: MOBILITY - ADULT  Goal: Maintain or return to baseline ADL function  Description: INTERVENTIONS:  -  Assess patient's ability to carry out ADLs; assess patient's baseline for ADL function and identify physical deficits which impact ability to perform ADLs (bathing, care of mouth/teeth, toileting, grooming, dressing, etc )  - Assess/evaluate cause of self-care deficits   - Assess range of motion  - Assess patient's mobility; develop plan if impaired  - Assess patient's need for assistive devices and provide as appropriate  - Encourage maximum independence but intervene and supervise when necessary  - Involve family in performance of ADLs  - Assess for home care needs following discharge   - Consider OT consult to assist with ADL evaluation and planning for discharge  - Provide patient education as appropriate  Outcome: Progressing  Goal: Maintains/Returns to pre admission functional level  Description: INTERVENTIONS:  - Perform BMAT or MOVE assessment daily    - Set and communicate daily mobility goal to care team and patient/family/caregiver     - Collaborate with rehabilitation services on mobility goals if consulted  - Out of bed for toileting  - Record patient progress and toleration of activity level   Outcome: Progressing     Problem: PAIN - ADULT  Goal: Verbalizes/displays adequate comfort level or baseline comfort level  Description: Interventions:  - Encourage patient to monitor pain and request assistance  - Assess pain using appropriate pain scale  - Administer analgesics based on type and severity of pain and evaluate response  - Implement non-pharmacological measures as appropriate and evaluate response  - Consider cultural and social influences on pain and pain management  - Notify physician/advanced practitioner if interventions unsuccessful or patient reports new pain  Outcome: Progressing     Problem: INFECTION - ADULT  Goal: Absence or prevention of progression during hospitalization  Description: INTERVENTIONS:  - Assess and monitor for signs and symptoms of infection  - Monitor lab/diagnostic results  - Monitor all insertion sites, i e  indwelling lines, tubes, and drains  - Monitor endotracheal if appropriate and nasal secretions for changes in amount and color  - [place] appropriate cooling/warming therapies per order  - Administer medications as ordered  - Instruct and encourage patient and family to use good hand hygiene technique  - Identify and instruct in appropriate isolation precautions for identified infection/condition  Outcome: Progressing  Goal: Absence of fever/infection during neutropenic period  Description: INTERVENTIONS:  - Monitor WBC    Outcome: Progressing     Problem: SAFETY ADULT  Goal: Maintain or return to baseline ADL function  Description: INTERVENTIONS:  -  Assess patient's ability to carry out ADLs; assess patient's baseline for ADL function and identify physical deficits which impact ability to perform ADLs (bathing, care of mouth/teeth, toileting, grooming, dressing, etc )  - Assess/evaluate cause of self-care deficits   - Assess range of motion  - Assess patient's mobility; develop plan if impaired  - Assess patient's need for assistive devices and provide as appropriate  - Encourage maximum independence but intervene and supervise when necessary  - Involve family in performance of ADLs  - Assess for home care needs following discharge   - Consider OT consult to assist with ADL evaluation and planning for discharge  - Provide patient education as appropriate  Outcome: Progressing  Goal: Maintains/Returns to pre admission functional level  Description: INTERVENTIONS:  - Perform BMAT or MOVE assessment daily    - Set and communicate daily mobility goal to care team and patient/family/caregiver     - Collaborate with rehabilitation services on mobility goals if consulted  - Stand patient 3 times a day  - Ambulate patient 3 times a day  - Out of bed to chair 3 times a day   - Out of bed for meals 3 times a day  - Out of bed for toileting  - Record patient progress and toleration of activity level   Outcome: Progressing  Goal: Patient will remain free of falls  Description: INTERVENTIONS:  - Educate patient/family on patient safety including physical limitations  - Instruct patient to call for assistance with activity   - Consult OT/PT to assist with strengthening/mobility   - Keep Call bell within reach  - Keep bed low and locked with side rails adjusted as appropriate  - Keep care items and personal belongings within reach  - Initiate and maintain comfort rounds  - Make Fall Risk Sign visible to staff  - Apply yellow socks and bracelet for high fall risk patients  - Consider moving patient to room near nurses station  Outcome: Progressing     Problem: DISCHARGE PLANNING  Goal: Discharge to home or other facility with appropriate resources  Description: INTERVENTIONS:  - Identify barriers to discharge w/patient and caregiver  - Arrange for needed discharge resources and transportation as appropriate  - Identify discharge learning needs (meds, wound care, etc )  - Arrange for interpretive services to assist at discharge as needed  - Refer to Case Management Department for coordinating discharge planning if the patient needs post-hospital services based on physician/advanced practitioner order or complex needs related to functional status, cognitive ability, or social support system  Outcome: Progressing     Problem: Knowledge Deficit  Goal: Patient/family/caregiver demonstrates understanding of disease process, treatment plan, medications, and discharge instructions  Description: Complete learning assessment and assess knowledge base    Interventions:  - Provide teaching at level of understanding  - Provide teaching via preferred learning methods  Outcome: Progressing     Problem: Potential for Falls  Goal: Patient will remain free of falls  Description: INTERVENTIONS:  - Educate patient/family on patient safety including physical limitations  - Instruct patient to call for assistance with activity   - Consult OT/PT to assist with strengthening/mobility   - Keep Call bell within reach  - Keep bed low and locked with side rails adjusted as appropriate  - Keep care items and personal belongings within reach  - Initiate and maintain comfort rounds  - Make Fall Risk Sign visible to staff  - Obtain necessary fall risk management equipment  - Apply yellow socks and bracelet for high fall risk patients  - Consider moving patient to room near nurses station  Outcome: Progressing

## 2022-07-02 NOTE — DISCHARGE INSTRUCTIONS
[facility]    Discharge Instructions  Light activity for 2 weeks  No heavy lifting for 2 weeks  Max 10 lbs for 2 weeks  No driving for 3-7 days or until pain is well controlled  Remove dressing in 2 days  Surgical glue will fall off with time  You may shower over dressing, replace if soiled  Take discharge medications as prescribed  Notify our office for nausea, vomiting, fever, diarrhea, chest pain, trouble breathing  Follow up in our office in 2 weeks or sooner if needed  Call with additional questions or concerns

## 2022-07-05 ENCOUNTER — TRANSITIONAL CARE MANAGEMENT (OUTPATIENT)
Dept: FAMILY MEDICINE CLINIC | Facility: CLINIC | Age: 37
End: 2022-07-05

## 2022-07-05 LAB
BACTERIA BLD CULT: NORMAL
BACTERIA BLD CULT: NORMAL

## 2022-07-05 NOTE — UTILIZATION REVIEW
Notification of Discharge   This is a Notification of Discharge from our facility [address]  Please be advised that this patient has been discharge from our facility  Below you will find the admission and discharge date and time including the patients disposition  UTILIZATION REVIEW CONTACT:  Zhang Lema  Utilization   Network Utilization Review Department  Phone: [phone] carefully listen to the prompts  All voicemails are confidential   Email: [email or web address]     PHYSICIAN ADVISORY SERVICES:  FOR [id number] REVIEW - MEDICAL NECESSITY DENIAL  Phone: [phone]  Fax: [phone]  Email: [email or web address]     PRESENTATION DATE: 6/29/2022  6:20 PM  OBERVATION ADMISSION DATE:   INPATIENT ADMISSION DATE: 6/30/22  3:35 PM   DISCHARGE DATE: 7/2/2022 10:36 AM  DISPOSITION: Home/Self Care Home/Self Care      IMPORTANT INFORMATION:  Send all requests for admission clinical reviews, approved or denied determinations and any other requests to dedicated fax number below belonging to the campus where the patient is receiving treatment   List of dedicated fax numbers:  [address] DENIALS (Administrative/Medical Necessity) [phone]   [address]  (Maternity/NICU/Pediatrics) [phone]   Esthela Fines [phone]   130 [facility] [phone]   [address] [place] [address] [phone]   2000 [facility] [address] [place] [address],[address] [phone]   [facility]  [phone]   2205 [facility], [address]  2401 [facility] 1000 [facility] [phone]

## 2022-07-14 ENCOUNTER — OFFICE VISIT (OUTPATIENT)
Dept: SURGERY | Facility: CLINIC | Age: 37
End: 2022-07-14

## 2022-07-14 VITALS
HEIGHT: 75 IN | HEART RATE: 94 BPM | BODY MASS INDEX: 26.98 KG/M2 | SYSTOLIC BLOOD PRESSURE: 111 MMHG | WEIGHT: 217 LBS | TEMPERATURE: 98 F | DIASTOLIC BLOOD PRESSURE: 78 MMHG

## 2022-07-14 DIAGNOSIS — K35.20 ACUTE APPENDICITIS WITH PERFORATION AND GENERALIZED PERITONITIS, WITHOUT GANGRENE, UNSPECIFIED WHETHER ABSCESS PRESENT: Primary | ICD-10-CM

## 2022-07-14 PROCEDURE — 99024 POSTOP FOLLOW-UP VISIT: CPT | Performed by: SURGERY

## 2022-07-14 NOTE — LETTER
July 14, 2022     Patient: Ever Drop  YOB: 1985  Date of Visit: 7/14/2022      To Whom it May Concern:    [place] is under my professional care  Hien Frye was seen in my office on 7/14/2022  Hien Sandhui can return to 7/18/2022 on light duty  He can return to full duty on 7/25/2022 with no restrictions  If you have any questions or concerns, please don't hesitate to call           Sincerely,          Sean Ackerman,         CC: No Recipients

## 2022-07-15 NOTE — PROGRESS NOTES
Assessment/Plan:    Appendicitis  Status post laparoscopic appendectomy  Doing well  Minimal pain the right lower quadrant  Incisions healing well  Pathology still pending  Patient may return to work light duty for a week and then full duty afterwards  He may follow up with me on an as-needed basis  I will reach out to him once the pathology is finalized  Diagnoses and all orders for this visit:    Acute appendicitis with perforation and generalized peritonitis, without gangrene, unspecified whether abscess present          Subjective:      Patient ID: Heidi Crocker is a 29 y o  male  HPI    The following portions of the patient's history were reviewed and updated as appropriate:   He  has a past medical history of Anxiety, Bipolar disorder ([facility] ), Depression, Disease of thyroid gland, Drug abuse ([facility] ), ETOH abuse, Hallucination, Psychiatric illness, Psychosis ([facility] ), Schizoaffective disorder, bipolar type ([facility] ) (6/30/2018), and Schizophrenia ([facility] )  He   Patient Active Problem List    Diagnosis Date Noted    Appendicitis 06/29/2022    Acne 05/05/2022    [facility] ) 12/03/2021    Hypothyroidism 12/03/2021    Need for lipid screening 12/03/2021    Screening for diabetes mellitus 12/03/2021    Schizoaffective disorder, bipolar type ([facility] ) 06/30/2018    Amphetamine abuse ([facility] ) 06/24/2018     He  has a past surgical history that includes APPENDECTOMY LAPAROSCOPIC (N/A, 6/29/2022)  His family history is not on file  He  reports that he has quit smoking  He uses smokeless tobacco  He reports previous alcohol use  He reports previous drug use  Drug: Methamphetamines    Current Outpatient Medications   Medication Sig Dispense Refill    benztropine (COGENTIN) 1 mg tablet Take 1 tablet (1 mg total) by mouth 2 (two) times a day 60 tablet 0    levothyroxine 175 mcg tablet Take 1 tablet (175 mcg total) by mouth daily 90 tablet 3    OXcarbazepine (TRILEPTAL) 600 mg tablet Take 600 mg by mouth every 12 (twelve) hours        perphenazine 4 mg tablet Take 3 at 1:00PM 90 tablet 0    risperiDONE (RisperDAL) 3 mg tablet Take 3 mg by mouth 2 (two) times a day         No current facility-administered medications for this visit  He has No Known Allergies       Review of Systems   Constitutional: Negative  Gastrointestinal: Positive for abdominal pain  Skin: Negative  Objective:      /78   Pulse 94   Temp 98 °F (36 7 °C)   Ht 6' 3" (1 905 m)   Wt 98 4 kg (217 lb)   BMI 27 12 kg/m²          Physical Exam  Vitals reviewed  Constitutional:       General: He is not in acute distress  Appearance: Normal appearance  He is not ill-appearing, toxic-appearing or diaphoretic  Abdominal:      General: Abdomen is flat  There is no distension  Palpations: Abdomen is soft  There is no mass  Tenderness: There is abdominal tenderness (Mild tenderness palpation of the right lower quadrant  )  There is no guarding or rebound  Hernia: No hernia is present  Comments: Incisions clean dry intact  Superficial scab overlying umbilical incision  Small amount of ecchymosis also noted  Neurological:      Mental Status: He is alert

## 2022-07-15 NOTE — ASSESSMENT & PLAN NOTE
Status post laparoscopic appendectomy  Doing well  Minimal pain the right lower quadrant  Incisions healing well  Pathology still pending  Patient may return to work light duty for a week and then full duty afterwards  He may follow up with me on an as-needed basis  I will reach out to him once the pathology is finalized

## 2022-10-12 PROBLEM — Z13.1 SCREENING FOR DIABETES MELLITUS: Status: RESOLVED | Noted: 2021-12-03 | Resolved: 2022-10-12

## 2022-10-12 PROBLEM — Z13.220 NEED FOR LIPID SCREENING: Status: RESOLVED | Noted: 2021-12-03 | Resolved: 2022-10-12

## 2022-11-02 DIAGNOSIS — E03.9 HYPOTHYROIDISM, UNSPECIFIED TYPE: Primary | ICD-10-CM

## 2022-11-02 DIAGNOSIS — E03.9 HYPOTHYROIDISM, UNSPECIFIED TYPE: ICD-10-CM

## 2022-11-02 RX ORDER — LEVOTHYROXINE SODIUM 175 UG/1
TABLET ORAL
Qty: 90 TABLET | Refills: 3 | Status: SHIPPED | OUTPATIENT
Start: 2022-11-02

## 2022-11-19 ENCOUNTER — APPOINTMENT (OUTPATIENT)
Dept: LAB | Facility: CLINIC | Age: 37
End: 2022-11-19

## 2022-11-19 DIAGNOSIS — E03.9 HYPOTHYROIDISM, UNSPECIFIED TYPE: ICD-10-CM

## 2022-11-19 LAB
T4 FREE SERPL-MCNC: 2.1 NG/DL (ref 0.76–1.46)
TSH SERPL DL<=0.05 MIU/L-ACNC: 4.73 UIU/ML (ref 0.45–4.5)

## 2022-11-21 DIAGNOSIS — E03.9 HYPOTHYROIDISM, UNSPECIFIED TYPE: ICD-10-CM

## 2022-11-21 RX ORDER — LEVOTHYROXINE SODIUM 0.2 MG/1
200 TABLET ORAL DAILY
Qty: 30 TABLET | Refills: 2 | Status: SHIPPED | OUTPATIENT
Start: 2022-11-21 | End: 2022-12-21

## 2022-12-08 ENCOUNTER — OFFICE VISIT (OUTPATIENT)
Dept: FAMILY MEDICINE CLINIC | Facility: CLINIC | Age: 37
End: 2022-12-08

## 2022-12-08 VITALS
SYSTOLIC BLOOD PRESSURE: 124 MMHG | OXYGEN SATURATION: 99 % | BODY MASS INDEX: 28.05 KG/M2 | DIASTOLIC BLOOD PRESSURE: 82 MMHG | HEIGHT: 75 IN | WEIGHT: 225.6 LBS | HEART RATE: 95 BPM | TEMPERATURE: 97.9 F

## 2022-12-08 DIAGNOSIS — Z00.00 HEALTH MAINTENANCE EXAMINATION: Primary | ICD-10-CM

## 2022-12-08 DIAGNOSIS — Z13.220 NEED FOR LIPID SCREENING: ICD-10-CM

## 2022-12-08 DIAGNOSIS — Z23 FLU VACCINE NEED: ICD-10-CM

## 2022-12-08 DIAGNOSIS — Z13.1 SCREENING FOR DIABETES MELLITUS: ICD-10-CM

## 2022-12-08 DIAGNOSIS — E03.9 HYPOTHYROIDISM, UNSPECIFIED TYPE: ICD-10-CM

## 2022-12-08 RX ORDER — LEVOTHYROXINE SODIUM 0.03 MG/1
25 TABLET ORAL DAILY
Qty: 30 TABLET | Refills: 2 | Status: SHIPPED | OUTPATIENT
Start: 2022-12-08 | End: 2023-01-07

## 2022-12-08 NOTE — PROGRESS NOTES
Name: Zack Aguirre      : 1985      MRN: [id number]  Encounter Provider: Aron Han MD  Encounter Date: 2022   Encounter department: [address] [place], [place] [address]     1  Health maintenance examination  Normal exam    2  Flu vaccine need  -     FLUZONE: influenza vaccine, quadrivalent, 0 5 mL    3  Hypothyroidism, unspecified type  -     levothyroxine (Synthroid) 25 mcg tablet; Take 1 tablet (25 mcg total) by mouth daily    4  Need for lipid screening  -     Lipid panel; Future    5  Screening for diabetes mellitus  -     Comprehensive metabolic panel; Future       F/U in 1 year    Subjective     Patient is here for a yearly physical  Denies any complaints  Has a Hx of schizophrenia and take his medications daily  Review of Systems   Constitutional: Negative for activity change, appetite change, fatigue and fever  HENT: Negative for congestion and ear discharge  Respiratory: Negative for cough and shortness of breath  Cardiovascular: Negative for chest pain and palpitations  Gastrointestinal: Negative for diarrhea and nausea  Musculoskeletal: Negative for arthralgias and back pain  Skin: Negative for color change and rash  Neurological: Negative for dizziness and headaches  Psychiatric/Behavioral: Negative for agitation and behavioral problems  Past Medical History:   Diagnosis Date   • Anxiety    • Bipolar disorder ([facility] )    • Depression    • Disease of thyroid gland    • Drug abuse ([facility] )    • ETOH abuse    • Hallucination    • Psychiatric illness    • Psychosis ([facility] )    • Schizoaffective disorder, bipolar type ([facility] ) 2018   • Schizophrenia ([facility] )      Past Surgical History:   Procedure Laterality Date   • APPENDECTOMY LAPAROSCOPIC N/A 2022    Procedure: APPENDECTOMY LAPAROSCOPIC, possible open;  Surgeon: Salomón Donahue DO;  Location: [facility];  Service: General     No family history on file    Social History     Socioeconomic History • Marital status: Single     Spouse name: None   • Number of children: None   • Years of education: None   • Highest education level: None   Occupational History   • None   Tobacco Use   • Smoking status: Former   • Smokeless tobacco: Current   • Tobacco comments:     vapes   Vaping Use   • Vaping Use: Every day   Substance and Sexual Activity   • Alcohol use: Not Currently     Comment: not in past 5 months per pt   • Drug use: Not Currently     Types: Methamphetamines     Comment: last used 5 months ago (snort or smoked)   • Sexual activity: None   Other Topics Concern   • None   Social History Narrative    ** Merged History Encounter **          Social Determinants of Health     Financial Resource Strain: Not on file   Food Insecurity: Not on file   Transportation Needs: Not on file   Physical Activity: Not on file   Stress: Not on file   Social Connections: Not on file   Intimate Partner Violence: Not on file   Housing Stability: Not on file     Current Outpatient Medications on File Prior to Visit   Medication Sig   • benztropine (COGENTIN) 1 mg tablet Take 1 tablet (1 mg total) by mouth 2 (two) times a day   • levothyroxine 200 mcg tablet Take 1 tablet (200 mcg total) by mouth daily   • OXcarbazepine (TRILEPTAL) 600 mg tablet Take 600 mg by mouth every 12 (twelve) hours     • risperiDONE (RisperDAL) 3 mg tablet Take 3 mg by mouth 2 (two) times a day     • perphenazine 4 mg tablet Take 3 at 1:00PM (Patient not taking: Reported on 12/8/2022)     No Known Allergies  Immunization History   Administered Date(s) Administered   • COVID-19 J&J (Radha) vaccine 0 5 mL 05/31/2021   • INFLUENZA 10/07/2021   • Influenza, injectable, quadrivalent, preservative free 0 5 mL 12/08/2022       Objective     /82 (BP Location: Left arm, Patient Position: Sitting)   Pulse 95   Temp 97 9 °F (36 6 °C) (Temporal)   Ht 6' 3" (1 905 m)   Wt 102 kg (225 lb 9 6 oz)   SpO2 99%   BMI 28 20 kg/m²     Physical Exam  Constitutional:       General: He is not in acute distress  Appearance: He is well-developed  He is not diaphoretic  Eyes:      General: No scleral icterus  Pupils: Pupils are equal, round, and reactive to light  Cardiovascular:      Rate and Rhythm: Normal rate and regular rhythm  Heart sounds: Normal heart sounds  No murmur heard  Pulmonary:      Effort: Pulmonary effort is normal  No respiratory distress  Breath sounds: Normal breath sounds  No wheezing  Abdominal:      General: Bowel sounds are normal  There is no distension  Palpations: Abdomen is soft  Tenderness: There is no abdominal tenderness  Skin:     General: Skin is warm and dry  Findings: No rash  Neurological:      Mental Status: He is alert and oriented to person, place, and time         Chetan Connor MD

## 2022-12-08 NOTE — PROGRESS NOTES
BMI Counseling: Body mass index is 28 2 kg/m²  The BMI is above normal  Nutrition recommendations include reducing portion sizes, decreasing overall calorie intake and 3-5 servings of fruits/vegetables daily  Exercise recommendations include exercising 3-5 times per week

## 2022-12-17 ENCOUNTER — APPOINTMENT (OUTPATIENT)
Dept: LAB | Facility: CLINIC | Age: 37
End: 2022-12-17

## 2022-12-17 DIAGNOSIS — R50.9 FEVER, UNSPECIFIED FEVER CAUSE: ICD-10-CM

## 2022-12-17 DIAGNOSIS — Z13.1 SCREENING FOR DIABETES MELLITUS: ICD-10-CM

## 2022-12-17 DIAGNOSIS — Z13.220 NEED FOR LIPID SCREENING: ICD-10-CM

## 2022-12-17 DIAGNOSIS — R10.31 RIGHT LOWER QUADRANT ABDOMINAL PAIN: ICD-10-CM

## 2022-12-17 LAB
ALBUMIN SERPL BCP-MCNC: 3.6 G/DL (ref 3.5–5)
ALP SERPL-CCNC: 78 U/L (ref 46–116)
ALT SERPL W P-5'-P-CCNC: 63 U/L (ref 12–78)
ANION GAP SERPL CALCULATED.3IONS-SCNC: 9 MMOL/L (ref 4–13)
AST SERPL W P-5'-P-CCNC: 39 U/L (ref 5–45)
BASOPHILS # BLD AUTO: 0.04 THOUSANDS/ÂΜL (ref 0–0.1)
BASOPHILS NFR BLD AUTO: 1 % (ref 0–1)
BILIRUB SERPL-MCNC: 0.4 MG/DL (ref 0.2–1)
BUN SERPL-MCNC: 16 MG/DL (ref 5–25)
CALCIUM SERPL-MCNC: 9.5 MG/DL (ref 8.3–10.1)
CHLORIDE SERPL-SCNC: 105 MMOL/L (ref 96–108)
CHOLEST SERPL-MCNC: 201 MG/DL
CO2 SERPL-SCNC: 24 MMOL/L (ref 21–32)
CREAT SERPL-MCNC: 1.04 MG/DL (ref 0.6–1.3)
EOSINOPHIL # BLD AUTO: 0.1 THOUSAND/ÂΜL (ref 0–0.61)
EOSINOPHIL NFR BLD AUTO: 2 % (ref 0–6)
ERYTHROCYTE [DISTWIDTH] IN BLOOD BY AUTOMATED COUNT: 12.4 % (ref 11.6–15.1)
GFR SERPL CREATININE-BSD FRML MDRD: 91 ML/MIN/1.73SQ M
GLUCOSE P FAST SERPL-MCNC: 96 MG/DL (ref 65–99)
HCT VFR BLD AUTO: 46.5 % (ref 36.5–49.3)
HDLC SERPL-MCNC: 47 MG/DL
HGB BLD-MCNC: 15.8 G/DL (ref 12–17)
IMM GRANULOCYTES # BLD AUTO: 0.01 THOUSAND/UL (ref 0–0.2)
IMM GRANULOCYTES NFR BLD AUTO: 0 % (ref 0–2)
LDLC SERPL CALC-MCNC: 121 MG/DL (ref 0–100)
LIPASE SERPL-CCNC: 63 U/L (ref 73–393)
LYMPHOCYTES # BLD AUTO: 1.61 THOUSANDS/ÂΜL (ref 0.6–4.47)
LYMPHOCYTES NFR BLD AUTO: 31 % (ref 14–44)
MCH RBC QN AUTO: 29.8 PG (ref 26.8–34.3)
MCHC RBC AUTO-ENTMCNC: 34 G/DL (ref 31.4–37.4)
MCV RBC AUTO: 88 FL (ref 82–98)
MONOCYTES # BLD AUTO: 0.47 THOUSAND/ÂΜL (ref 0.17–1.22)
MONOCYTES NFR BLD AUTO: 9 % (ref 4–12)
NEUTROPHILS # BLD AUTO: 2.99 THOUSANDS/ÂΜL (ref 1.85–7.62)
NEUTS SEG NFR BLD AUTO: 57 % (ref 43–75)
NONHDLC SERPL-MCNC: 154 MG/DL
NRBC BLD AUTO-RTO: 0 /100 WBCS
PLATELET # BLD AUTO: 260 THOUSANDS/UL (ref 149–390)
PMV BLD AUTO: 10.1 FL (ref 8.9–12.7)
POTASSIUM SERPL-SCNC: 3.9 MMOL/L (ref 3.5–5.3)
PROT SERPL-MCNC: 7.6 G/DL (ref 6.4–8.4)
RBC # BLD AUTO: 5.3 MILLION/UL (ref 3.88–5.62)
SODIUM SERPL-SCNC: 138 MMOL/L (ref 135–147)
TRIGL SERPL-MCNC: 163 MG/DL
WBC # BLD AUTO: 5.22 THOUSAND/UL (ref 4.31–10.16)

## 2023-01-01 NOTE — NURSING NOTE
Bedside and Verbal shift change report given to ROYA Matson LPN  (oncoming nurse) by Noah Oliva RN (offgoing nurse). Report given with SBAR, Kardex, Intake/Output, MAR and Recent Results. Pt has been visible on the unit during the day, pt denies current SI/hi at this time, pt has been able to let his needs be known to staff, pt has been withdrawn to his room and has been napping  Pt reports that the voices are still there ,but they are not agitating him at this time  Will continue to monitor

## 2023-02-06 PROBLEM — Z00.00 HEALTH MAINTENANCE EXAMINATION: Status: RESOLVED | Noted: 2022-12-08 | Resolved: 2023-02-06

## 2023-02-15 DIAGNOSIS — E03.9 HYPOTHYROIDISM, UNSPECIFIED TYPE: Primary | ICD-10-CM

## 2023-02-15 RX ORDER — LEVOTHYROXINE SODIUM 175 UG/1
175 TABLET ORAL
Qty: 45 TABLET | Refills: 0 | Status: SHIPPED | OUTPATIENT
Start: 2023-02-15

## 2023-02-15 NOTE — TELEPHONE ENCOUNTER
Patient called for a refill on his thyroid medication  He stated he has been on 175 mcg  Refilled the 175 and requested he has his labs done in March

## 2023-03-15 DIAGNOSIS — E03.9 HYPOTHYROIDISM, UNSPECIFIED TYPE: ICD-10-CM

## 2023-03-15 RX ORDER — LEVOTHYROXINE SODIUM 175 UG/1
TABLET ORAL
Qty: 45 TABLET | Refills: 0 | Status: SHIPPED | OUTPATIENT
Start: 2023-03-15

## 2023-03-23 ENCOUNTER — APPOINTMENT (OUTPATIENT)
Dept: LAB | Facility: CLINIC | Age: 38
End: 2023-03-23

## 2023-03-23 DIAGNOSIS — E03.9 HYPOTHYROIDISM, UNSPECIFIED TYPE: ICD-10-CM

## 2023-03-23 LAB — TSH SERPL DL<=0.05 MIU/L-ACNC: 0.96 UIU/ML (ref 0.45–4.5)

## 2023-03-27 NOTE — PROGRESS NOTES
Abdomen , soft, nontender, nondistended , no guarding or rigidity , no masses palpable , normal bowel sounds , Liver and Spleen , no hepatomegaly present , no hepatosplenomegaly , liver nontender , spleen not palpable Patient is withdrawn to room  No groups noted  Quiet during assessment, appear paranoid when nurse entered the room  Complaint with medications  Continues to have auditory hallucination  Noted internal preoccupation  Maintained on Q 15 minute safety checks  No acting out, suicidal ideations, and/or homicidal behaviors  No changes in medical condition or current complaints noted  Remains a fall risk  Fluids maintained at bedside to promote hydration

## 2023-05-28 DIAGNOSIS — E03.9 HYPOTHYROIDISM, UNSPECIFIED TYPE: ICD-10-CM

## 2023-05-30 RX ORDER — LEVOTHYROXINE SODIUM 175 UG/1
TABLET ORAL
Qty: 90 TABLET | Refills: 0 | Status: SHIPPED | OUTPATIENT
Start: 2023-05-30

## 2023-07-14 NOTE — PROGRESS NOTES
Patient scheduled for lab appointment on 07/18 ordered by Dr. Nelson. Orders are missing. Please enter or extend lab orders prior to the appointment noted above. 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)    Unspecified mood (affective) disorder (HCC)    Amphetamine abuse      Behavior over the last 24 hours:    No new clinical concerns offered by staff  He received a phone call from a friend, Armando Cornejo, yesterday with a potential job opportunity  He says he will be talking to him again today and is hopeful he can stay with him temporarily  He reports that the voices are more in the background, not as bothersome as they had been  Mood reported as good  He offered no new complaints  Sleep: normal  Appetite: normal  Medication side effects: No  ROS: no complaints    Mental Status Evaluation:  Appearance:  age appropriate and disheveled   Behavior:  cooperative   Speech:  clear   Mood:  euthymic   Affect:  constricted   Thought Process:  coherent   Thought Content:  no overt delusions   Perceptual Disturbances: Auditory hallucinations without commands   Risk Potential: denies SI/HI   Sensorium:  person, place, time/date and situation   Cognition:  grossly intact   Consciousness:  alert and awake    Attention: attention span and concentration were age appropriate   Insight:  limited   Judgment: limited   Gait/Station: normal gait/station   Motor Activity: no abnormal movements     Progress Toward Goals: Ongoing    Recommended Treatment: Continue with group therapy, milieu therapy and occupational therapy  Risks, benefits and possible side effects of Medications:   Risks, benefits, and possible side effects of medications explained to patient and patient verbalizes understanding        Medications:   all current active meds have been reviewed, continue current psychiatric medications and current meds:   Current Facility-Administered Medications   Medication Dose Route Frequency    acetaminophen (TYLENOL) tablet 650 mg  650 mg Oral Q4H PRN    aluminum-magnesium hydroxide-simethicone (MYLANTA) 200-200-20 mg/5 mL oral suspension 30 mL  30 mL Oral Q4H PRN    benztropine (COGENTIN) tablet 1 mg  1 mg Oral BID    chlorproMAZINE (THORAZINE) injection SOLN 25 mg  25 mg Intramuscular Q6H PRN    chlorproMAZINE (THORAZINE) tablet 25 mg  25 mg Oral Q6H PRN    diphenhydrAMINE (BENADRYL) injection 50 mg  50 mg Intramuscular Q4H PRN    diphenhydrAMINE (BENADRYL) tablet 25 mg  25 mg Oral BID    diphenhydrAMINE (BENADRYL) tablet 50 mg  50 mg Oral Q4H PRN    levothyroxine tablet 50 mcg  50 mcg Oral Early Morning    magnesium hydroxide (MILK OF MAGNESIA) 400 mg/5 mL oral suspension 30 mL  30 mL Oral Daily PRN    nicotine (NICODERM CQ) 21 mg/24 hr TD 24 hr patch 21 mg  21 mg Transdermal Daily    OXcarbazepine (TRILEPTAL) tablet 450 mg  450 mg Oral Q12H [address]    perphenazine tablet 12 mg  12 mg Oral Daily    perphenazine tablet 16 mg  16 mg Oral QAM    perphenazine tablet 16 mg  16 mg Oral After Dinner    traZODone (DESYREL) tablet 50 mg  50 mg Oral HS PRN     Labs: Reviewed    Counseling / Coordination of Care  Total floor / unit time spent today 15 minutes  Greater than 50% of total time was spent with the patient and / or family counseling and / or coordination of care   A description of the counseling / coordination of care: 15

## 2023-07-17 DIAGNOSIS — E03.9 HYPOTHYROIDISM, UNSPECIFIED TYPE: ICD-10-CM

## 2023-07-17 RX ORDER — LEVOTHYROXINE SODIUM 175 UG/1
175 TABLET ORAL DAILY
Qty: 90 TABLET | Refills: 0 | Status: SHIPPED | OUTPATIENT
Start: 2023-07-17 | End: 2023-07-27 | Stop reason: SDUPTHER

## 2023-07-25 DIAGNOSIS — E03.9 HYPOTHYROIDISM, UNSPECIFIED TYPE: ICD-10-CM

## 2023-07-26 RX ORDER — LEVOTHYROXINE SODIUM 175 UG/1
175 TABLET ORAL DAILY
Qty: 90 TABLET | Refills: 0 | OUTPATIENT
Start: 2023-07-26

## 2023-07-27 DIAGNOSIS — E03.9 HYPOTHYROIDISM, UNSPECIFIED TYPE: ICD-10-CM

## 2023-07-27 RX ORDER — LEVOTHYROXINE SODIUM 175 UG/1
175 TABLET ORAL DAILY
Qty: 90 TABLET | Refills: 2 | Status: SHIPPED | OUTPATIENT
Start: 2023-07-27
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
## 2023-09-22 NOTE — PROGRESS NOTES
Pt is and has been sleeping soundly through the night  No periods of waking have been observed  No acute concerns have been noted  15 minute checks have been maintained incident free   Will continue to monitor Pt not in room. NP aware.

## 2023-10-18 ENCOUNTER — TELEPHONE (OUTPATIENT)
Dept: PSYCHIATRY | Facility: CLINIC | Age: 38
End: 2023-10-18

## 2023-10-18 NOTE — TELEPHONE ENCOUNTER
Patient has been added to the Medication Management and Talk Therapy wait list without a referral.    Insurance: [employer] Type:    Commercial [x]   Medicaid []   [place] (if applicable)   Medicare []  Location Preference: [place]  Provider Preference: none  Virtual: Yes [x] No []  Were outside resources sent: Yes [x] No []  Advised the patient to contact his PCP for a referral.

## 2023-10-21 ENCOUNTER — TELEPHONE (OUTPATIENT)
Dept: OTHER | Facility: HOSPITAL | Age: 38
End: 2023-10-21

## 2023-10-23 DIAGNOSIS — F25.0 SCHIZOAFFECTIVE DISORDER, BIPOLAR TYPE (HCC): Primary | Chronic | ICD-10-CM

## 2023-10-23 DIAGNOSIS — F20.9 SCHIZOPHRENIA, UNSPECIFIED TYPE (HCC): ICD-10-CM

## 2023-10-27 NOTE — TELEPHONE ENCOUNTER
Pt is asking Dr if he would prescribe his psych med's until he see's the psychiatrist.. He is now on a waiting list.  Please call pt & let him know.

## 2023-11-02 ENCOUNTER — TELEPHONE (OUTPATIENT)
Dept: PSYCHIATRY | Facility: CLINIC | Age: 38
End: 2023-11-02

## 2023-11-29 ENCOUNTER — APPOINTMENT (OUTPATIENT)
Dept: LAB | Facility: CLINIC | Age: 38
End: 2023-11-29
Payer: COMMERCIAL

## 2023-11-29 DIAGNOSIS — Z79.899 ENCOUNTER FOR LONG-TERM (CURRENT) USE OF OTHER MEDICATIONS: ICD-10-CM

## 2023-11-29 LAB
ALBUMIN SERPL BCP-MCNC: 4.4 G/DL (ref 3.5–5)
ALP SERPL-CCNC: 83 U/L (ref 34–104)
ALT SERPL W P-5'-P-CCNC: 41 U/L (ref 7–52)
ANION GAP SERPL CALCULATED.3IONS-SCNC: 10 MMOL/L
AST SERPL W P-5'-P-CCNC: 107 U/L (ref 13–39)
BASOPHILS # BLD AUTO: 0.06 THOUSANDS/ÂΜL (ref 0–0.1)
BASOPHILS NFR BLD AUTO: 1 % (ref 0–1)
BILIRUB SERPL-MCNC: 0.4 MG/DL (ref 0.2–1)
BUN SERPL-MCNC: 14 MG/DL (ref 5–25)
CALCIUM SERPL-MCNC: 8.8 MG/DL (ref 8.4–10.2)
CHLORIDE SERPL-SCNC: 99 MMOL/L (ref 96–108)
CHOLEST SERPL-MCNC: 237 MG/DL
CO2 SERPL-SCNC: 27 MMOL/L (ref 21–32)
CREAT SERPL-MCNC: 1.22 MG/DL (ref 0.6–1.3)
EOSINOPHIL # BLD AUTO: 0.17 THOUSAND/ÂΜL (ref 0–0.61)
EOSINOPHIL NFR BLD AUTO: 2 % (ref 0–6)
ERYTHROCYTE [DISTWIDTH] IN BLOOD BY AUTOMATED COUNT: 14.1 % (ref 11.6–15.1)
EST. AVERAGE GLUCOSE BLD GHB EST-MCNC: 114 MG/DL
GFR SERPL CREATININE-BSD FRML MDRD: 75 ML/MIN/1.73SQ M
GLUCOSE SERPL-MCNC: 127 MG/DL (ref 65–140)
HBA1C MFR BLD: 5.6 %
HCT VFR BLD AUTO: 43.7 % (ref 36.5–49.3)
HDLC SERPL-MCNC: 74 MG/DL
HGB BLD-MCNC: 15.1 G/DL (ref 12–17)
IMM GRANULOCYTES # BLD AUTO: 0.04 THOUSAND/UL (ref 0–0.2)
IMM GRANULOCYTES NFR BLD AUTO: 0 % (ref 0–2)
LDLC SERPL CALC-MCNC: 136 MG/DL (ref 0–100)
LYMPHOCYTES # BLD AUTO: 1.56 THOUSANDS/ÂΜL (ref 0.6–4.47)
LYMPHOCYTES NFR BLD AUTO: 16 % (ref 14–44)
MCH RBC QN AUTO: 31.7 PG (ref 26.8–34.3)
MCHC RBC AUTO-ENTMCNC: 34.6 G/DL (ref 31.4–37.4)
MCV RBC AUTO: 92 FL (ref 82–98)
MONOCYTES # BLD AUTO: 0.39 THOUSAND/ÂΜL (ref 0.17–1.22)
MONOCYTES NFR BLD AUTO: 4 % (ref 4–12)
NEUTROPHILS # BLD AUTO: 7.39 THOUSANDS/ÂΜL (ref 1.85–7.62)
NEUTS SEG NFR BLD AUTO: 77 % (ref 43–75)
NONHDLC SERPL-MCNC: 163 MG/DL
NRBC BLD AUTO-RTO: 0 /100 WBCS
PLATELET # BLD AUTO: 252 THOUSANDS/UL (ref 149–390)
PMV BLD AUTO: 10.3 FL (ref 8.9–12.7)
POTASSIUM SERPL-SCNC: 3.5 MMOL/L (ref 3.5–5.3)
PROT SERPL-MCNC: 6.8 G/DL (ref 6.4–8.4)
RBC # BLD AUTO: 4.76 MILLION/UL (ref 3.88–5.62)
SODIUM SERPL-SCNC: 136 MMOL/L (ref 135–147)
TRIGL SERPL-MCNC: 136 MG/DL
WBC # BLD AUTO: 9.61 THOUSAND/UL (ref 4.31–10.16)

## 2023-11-29 PROCEDURE — 86376 MICROSOMAL ANTIBODY EACH: CPT

## 2023-11-29 PROCEDURE — 80061 LIPID PANEL: CPT

## 2023-11-29 PROCEDURE — 80053 COMPREHEN METABOLIC PANEL: CPT

## 2023-11-29 PROCEDURE — 83036 HEMOGLOBIN GLYCOSYLATED A1C: CPT

## 2023-11-29 PROCEDURE — 36415 COLL VENOUS BLD VENIPUNCTURE: CPT

## 2023-11-29 PROCEDURE — 86800 THYROGLOBULIN ANTIBODY: CPT

## 2023-11-29 PROCEDURE — 85025 COMPLETE CBC W/AUTO DIFF WBC: CPT

## 2023-11-30 LAB
THYROGLOB AB SERPL-ACNC: 1181 IU/ML (ref 0–0.9)
THYROPEROXIDASE AB SERPL-ACNC: >600 IU/ML (ref 0–34)

## 2023-12-14 ENCOUNTER — TELEPHONE (OUTPATIENT)
Dept: FAMILY MEDICINE CLINIC | Facility: CLINIC | Age: 38
End: 2023-12-14

## 2023-12-14 DIAGNOSIS — E03.9 HYPOTHYROIDISM, UNSPECIFIED TYPE: Primary | ICD-10-CM

## 2023-12-14 NOTE — TELEPHONE ENCOUNTER
Pt said his therapist had him ck his thyroid. Jimi Miranda it came back abnormal.. She recommended he contact his PCP to get back on thyroid medication, as this can affect his mood and othjer things. Dr Niurka Ibarra has no appt's until into January. What does he recommend ? ? Please call pt back and let him know what he is to do.

## 2023-12-22 ENCOUNTER — OFFICE VISIT (OUTPATIENT)
Dept: FAMILY MEDICINE CLINIC | Facility: CLINIC | Age: 38
End: 2023-12-22
Payer: COMMERCIAL

## 2023-12-22 VITALS
SYSTOLIC BLOOD PRESSURE: 128 MMHG | WEIGHT: 229 LBS | HEART RATE: 90 BPM | OXYGEN SATURATION: 97 % | TEMPERATURE: 96.5 F | DIASTOLIC BLOOD PRESSURE: 94 MMHG | HEIGHT: 75 IN | BODY MASS INDEX: 28.47 KG/M2

## 2023-12-22 DIAGNOSIS — E03.9 HYPOTHYROIDISM, UNSPECIFIED TYPE: ICD-10-CM

## 2023-12-22 DIAGNOSIS — R73.01 IMPAIRED FASTING GLUCOSE: ICD-10-CM

## 2023-12-22 DIAGNOSIS — Z00.00 HEALTH MAINTENANCE EXAMINATION: Primary | ICD-10-CM

## 2023-12-22 PROBLEM — K37 APPENDICITIS: Status: RESOLVED | Noted: 2022-06-29 | Resolved: 2023-12-22

## 2023-12-22 PROCEDURE — 99395 PREV VISIT EST AGE 18-39: CPT | Performed by: FAMILY MEDICINE

## 2023-12-22 RX ORDER — LEVOTHYROXINE SODIUM 175 UG/1
175 TABLET ORAL DAILY
Qty: 30 TABLET | Refills: 2 | Status: SHIPPED | OUTPATIENT
Start: 2023-12-22

## 2023-12-22 NOTE — PROGRESS NOTES
Name: Rivas Chauhan      : 1985      MRN: [id number]  Encounter Provider: Landon Anne MD  Encounter Date: 2023   Encounter department: [facility]    Assessment & Plan     Health maintenance exam  Normal exam    2. Hypothyroidism, unspecified type  Recommend to re-start medication and take it daily  -     levothyroxine 175 mcg tablet; Take 1 tablet (175 mcg total) by mouth daily    3. Impaired fasting glucose  -     Comprehensive metabolic panel; Future; Expected date: 2024  -     Hemoglobin A1C; Future; Expected date: 2024    Follow up in 1 year or as needed      Depression Screening and Follow-up Plan: Patient was screened for depression during today's encounter. They screened negative with a PHQ-2 score of 0.        Subjective     Patient has a Hx of hypothyroidism. He stopped taking his medication a few months ago. His psychiatrist stated that he needs to re-start his medication.       Review of Systems   Constitutional:  Negative for activity change, appetite change, fatigue and fever.   HENT:  Negative for congestion and ear discharge.    Respiratory:  Negative for cough and shortness of breath.    Cardiovascular:  Negative for chest pain and palpitations.   Gastrointestinal:  Negative for diarrhea and nausea.   Musculoskeletal:  Negative for arthralgias and back pain.   Skin:  Negative for color change and rash.   Neurological:  Negative for dizziness and headaches.   Psychiatric/Behavioral:  Negative for agitation and behavioral problems.        Past Medical History:   Diagnosis Date    Anxiety     Bipolar disorder (HCC)     Depression     Disease of thyroid gland     Drug abuse (HCC)     ETOH abuse     Hallucination     Psychiatric illness     Psychosis (HCC)     Schizoaffective disorder, bipolar type (HCC) 2018    Schizophrenia ([facility])      Past Surgical History:   Procedure Laterality Date    APPENDECTOMY LAPAROSCOPIC N/A 2022    Procedure:  APPENDECTOMY LAPAROSCOPIC, possible open;  Surgeon: Declan Hill DO;  Location: [facility];  Service: General     History reviewed. No pertinent family history.  Social History     Socioeconomic History    Marital status: Single     Spouse name: None    Number of children: None    Years of education: None    Highest education level: None   Occupational History    None   Tobacco Use    Smoking status: Former    Smokeless tobacco: Current    Tobacco comments:     vapes   Vaping Use    Vaping status: Every Day   Substance and Sexual Activity    Alcohol use: Not Currently     Comment: not in past 5 months per pt    Drug use: Not Currently     Types: Methamphetamines     Comment: last used 5 months ago (snort or smoked)    Sexual activity: None   Other Topics Concern    None   Social History Narrative    ** Merged History Encounter **          Social Determinants of Health     Financial Resource Strain: Not on file   Food Insecurity: Not on file   Transportation Needs: Not on file   Physical Activity: Not on file   Stress: Not on file   Social Connections: Not on file   Intimate Partner Violence: Not on file   Housing Stability: Not on file     Current Outpatient Medications on File Prior to Visit   Medication Sig    benztropine (COGENTIN) 1 mg tablet Take 1 tablet (1 mg total) by mouth 2 (two) times a day    OXcarbazepine (TRILEPTAL) 600 mg tablet Take 600 mg by mouth every 12 (twelve) hours      risperiDONE (RisperDAL) 3 mg tablet Take 3 mg by mouth 2 (two) times a day      [DISCONTINUED] levothyroxine 175 mcg tablet Take 1 tablet (175 mcg total) by mouth daily    [DISCONTINUED] perphenazine 4 mg tablet Take 3 at 1:00PM (Patient not taking: Reported on 12/8/2022)     No Known Allergies  Immunization History   Administered Date(s) Administered    COVID-19 J&J (Radha) vaccine 0.5 mL 05/31/2021    INFLUENZA 10/07/2021    Influenza, injectable, quadrivalent, preservative free 0.5 mL 12/08/2022       Objective     BP  "128/94   Pulse 90   Temp (!) 96.5 °F (35.8 °C)   Ht 6' 3\" (1.905 m)   Wt 104 kg (229 lb)   SpO2 97%   BMI 28.62 kg/m²     Physical Exam  Constitutional:       General: He is not in acute distress.     Appearance: He is well-developed. He is not diaphoretic.   Eyes:      General: No scleral icterus.     Pupils: Pupils are equal, round, and reactive to light.   Cardiovascular:      Rate and Rhythm: Normal rate and regular rhythm.      Heart sounds: Normal heart sounds. No murmur heard.  Pulmonary:      Effort: Pulmonary effort is normal. No respiratory distress.      Breath sounds: Normal breath sounds. No wheezing.   Abdominal:      General: Bowel sounds are normal. There is no distension.      Palpations: Abdomen is soft.      Tenderness: There is no abdominal tenderness.   Skin:     General: Skin is warm and dry.      Findings: No rash.   Neurological:      Mental Status: He is alert and oriented to person, place, and time.       Landon Anne MD    "

## 2024-01-15 ENCOUNTER — OFFICE VISIT (OUTPATIENT)
Dept: URGENT CARE | Facility: CLINIC | Age: 39
End: 2024-01-15
Payer: COMMERCIAL

## 2024-01-15 VITALS
TEMPERATURE: 98.7 F | OXYGEN SATURATION: 98 % | DIASTOLIC BLOOD PRESSURE: 85 MMHG | SYSTOLIC BLOOD PRESSURE: 139 MMHG | HEART RATE: 115 BPM | RESPIRATION RATE: 18 BRPM

## 2024-01-15 DIAGNOSIS — J06.9 ACUTE URI: Primary | ICD-10-CM

## 2024-01-15 PROCEDURE — 99213 OFFICE O/P EST LOW 20 MIN: CPT | Performed by: PHYSICAL MEDICINE & REHABILITATION

## 2024-01-15 RX ORDER — BENZONATATE 100 MG/1
100 CAPSULE ORAL 3 TIMES DAILY PRN
Qty: 20 CAPSULE | Refills: 0 | Status: SHIPPED | OUTPATIENT
Start: 2024-01-15

## 2024-01-15 RX ORDER — RISPERIDONE 4 MG/1
TABLET ORAL
COMMUNITY
Start: 2024-01-12

## 2024-01-15 RX ORDER — LOXAPINE SUCCINATE 10 MG/1
TABLET ORAL
COMMUNITY
Start: 2024-01-12

## 2024-01-15 NOTE — LETTER
January 15, 2024     Patient: Rivas Chauhan   YOB: 1985   Date of Visit: 1/15/2024       To Whom it May Concern:    Rivas Chauahn was seen in my clinic on 1/15/2024. He may return to work on 1/16/24 .    If you have any questions or concerns, please don't hesitate to call.         Sincerely,          Katarina Payne PA-C        CC: No Recipients

## 2024-01-15 NOTE — PROGRESS NOTES
[facility]        NAME: Rivas Chauhan is a 38 y.o. male  : 1985    MRN: [id number]  DATE: January 15, 2024  TIME: 9:04 AM    Assessment and Plan   Acute URI [J06.9]  1. Acute URI  benzonatate (TESSALON PERLES) 100 mg capsule        Viral URI based on presentation.     Patient Instructions     Can trial OTC cold/flu medications.   Will send Tessalon Perles prescription for dry cough to the pharmacy. Can take up to three times a day as needed.  Follow up with PCP in 3-5 days.  Proceed to  ER if symptoms worsen.    Chief Complaint     Chief Complaint   Patient presents with    Cough     fever, nausea, congestion x3 days.          History of Present Illness       Patient presenting with a dry cough, fever, nausea and congestion. Symptoms started 3 days ago. He has not tried anything over the counter. Symptoms feel the same without improvement or worsening.    Cough  Associated symptoms include a fever and headaches. Pertinent negatives include no chest pain, myalgias, postnasal drip, rhinorrhea, sore throat or shortness of breath.       Review of Systems   Review of Systems   Constitutional:  Positive for fever.   HENT:  Positive for congestion. Negative for postnasal drip, rhinorrhea, sinus pressure and sore throat.    Respiratory:  Positive for cough. Negative for shortness of breath.    Cardiovascular:  Negative for chest pain.   Gastrointestinal:  Positive for nausea.   Musculoskeletal: Negative.  Negative for myalgias.   Neurological:  Positive for headaches.         Current Medications       Current Outpatient Medications:     benzonatate (TESSALON PERLES) 100 mg capsule, Take 1 capsule (100 mg total) by mouth 3 (three) times a day as needed for cough, Disp: 20 capsule, Rfl: 0    benztropine (COGENTIN) 1 mg tablet, Take 1 tablet (1 mg total) by mouth 2 (two) times a day, Disp: 60 tablet, Rfl: 0    levothyroxine 175 mcg tablet, Take 1 tablet (175 mcg total) by mouth daily, Disp: 30 tablet,  Rfl: 2    loxapine (LOXITANE) 10 mg capsule, , Disp: , Rfl:     OXcarbazepine (TRILEPTAL) 600 mg tablet, Take 600 mg by mouth every 12 (twelve) hours  , Disp: , Rfl:     risperiDONE (RisperDAL) 4 mg tablet, , Disp: , Rfl:     risperiDONE (RisperDAL) 3 mg tablet, Take 3 mg by mouth 2 (two) times a day   (Patient not taking: Reported on 1/15/2024), Disp: , Rfl:     Current Allergies     Allergies as of 01/15/2024    (No Known Allergies)            The following portions of the patient's history were reviewed and updated as appropriate: allergies, current medications, past family history, past medical history, past social history, past surgical history and problem list.     Past Medical History:   Diagnosis Date    Amphetamine abuse (HCC) 06/24/2018    Anxiety     Bipolar disorder (HCC)     Depression     Disease of thyroid gland     Drug abuse (HCC)     ETOH abuse     Hallucination     Psychiatric illness     Psychosis (HCC)     Schizoaffective disorder, bipolar type (HCC) 06/30/2018    Schizophrenia ([facility])        Past Surgical History:   Procedure Laterality Date    APPENDECTOMY LAPAROSCOPIC N/A 6/29/2022    Procedure: APPENDECTOMY LAPAROSCOPIC, possible open;  Surgeon: Declan Hill DO;  Location: [facility];  Service: General       No family history on file.      Medications have been verified.        Objective   /85   Pulse (!) 115   Temp 98.7 °F (37.1 °C)   Resp 18   SpO2 98%        Physical Exam     Physical Exam  Constitutional:       General: He is not in acute distress.     Appearance: He is ill-appearing.   HENT:      Right Ear: Tympanic membrane normal.      Left Ear: Tympanic membrane normal.      Nose: Congestion present. No rhinorrhea.      Mouth/Throat:      Mouth: Mucous membranes are moist.      Pharynx: Oropharynx is clear. No oropharyngeal exudate or posterior oropharyngeal erythema.   Eyes:      Conjunctiva/sclera: Conjunctivae normal.   Cardiovascular:      Rate and Rhythm: Normal rate  and regular rhythm.      Heart sounds: Normal heart sounds.   Pulmonary:      Effort: Pulmonary effort is normal. No respiratory distress.      Breath sounds: Normal breath sounds. No wheezing, rhonchi or rales.   Musculoskeletal:      Cervical back: Normal range of motion and neck supple.   Lymphadenopathy:      Cervical: No cervical adenopathy.   Skin:     General: Skin is warm.   Neurological:      Mental Status: He is alert.   Psychiatric:         Mood and Affect: Mood normal.         Behavior: Behavior normal.

## 2024-01-19 DIAGNOSIS — E03.9 HYPOTHYROIDISM, UNSPECIFIED TYPE: ICD-10-CM

## 2024-01-19 RX ORDER — LEVOTHYROXINE SODIUM 175 UG/1
175 TABLET ORAL DAILY
Qty: 30 TABLET | Refills: 2 | Status: SHIPPED | OUTPATIENT
Start: 2024-01-19

## 2024-02-20 PROBLEM — Z00.00 HEALTH MAINTENANCE EXAMINATION: Status: RESOLVED | Noted: 2022-12-08 | Resolved: 2024-02-20

## 2024-03-05 ENCOUNTER — APPOINTMENT (OUTPATIENT)
Dept: LAB | Facility: CLINIC | Age: 39
End: 2024-03-05
Payer: COMMERCIAL

## 2024-03-05 DIAGNOSIS — R73.01 IMPAIRED FASTING GLUCOSE: ICD-10-CM

## 2024-03-05 DIAGNOSIS — E03.9 HYPOTHYROIDISM, UNSPECIFIED TYPE: ICD-10-CM

## 2024-03-05 LAB
ALBUMIN SERPL BCP-MCNC: 4.1 G/DL (ref 3.5–5)
ALP SERPL-CCNC: 73 U/L (ref 34–104)
ALT SERPL W P-5'-P-CCNC: 32 U/L (ref 7–52)
ANION GAP SERPL CALCULATED.3IONS-SCNC: 11 MMOL/L
AST SERPL W P-5'-P-CCNC: 46 U/L (ref 13–39)
BILIRUB SERPL-MCNC: 0.58 MG/DL (ref 0.2–1)
BUN SERPL-MCNC: 13 MG/DL (ref 5–25)
CALCIUM SERPL-MCNC: 9 MG/DL (ref 8.4–10.2)
CHLORIDE SERPL-SCNC: 99 MMOL/L (ref 96–108)
CO2 SERPL-SCNC: 25 MMOL/L (ref 21–32)
CREAT SERPL-MCNC: 1.19 MG/DL (ref 0.6–1.3)
EST. AVERAGE GLUCOSE BLD GHB EST-MCNC: 123 MG/DL
GFR SERPL CREATININE-BSD FRML MDRD: 77 ML/MIN/1.73SQ M
GLUCOSE P FAST SERPL-MCNC: 133 MG/DL (ref 65–99)
HBA1C MFR BLD: 5.9 %
POTASSIUM SERPL-SCNC: 3.5 MMOL/L (ref 3.5–5.3)
PROT SERPL-MCNC: 6.7 G/DL (ref 6.4–8.4)
SODIUM SERPL-SCNC: 135 MMOL/L (ref 135–147)
T4 FREE SERPL-MCNC: 1.85 NG/DL (ref 0.61–1.12)
TSH SERPL DL<=0.05 MIU/L-ACNC: 22.66 UIU/ML (ref 0.45–4.5)

## 2024-03-05 PROCEDURE — 36415 COLL VENOUS BLD VENIPUNCTURE: CPT

## 2024-03-05 PROCEDURE — 84443 ASSAY THYROID STIM HORMONE: CPT

## 2024-03-05 PROCEDURE — 80053 COMPREHEN METABOLIC PANEL: CPT

## 2024-03-05 PROCEDURE — 84439 ASSAY OF FREE THYROXINE: CPT

## 2024-03-05 PROCEDURE — 83036 HEMOGLOBIN GLYCOSYLATED A1C: CPT

## 2024-03-17 NOTE — PROGRESS NOTES
Patient has been observed sleeping for the majority of the night  Non-labored breathing and no signs of distress noted  Q15 minute checks maintained for pt safety  [id number]

## 2024-04-04 DIAGNOSIS — E03.9 HYPOTHYROIDISM, UNSPECIFIED TYPE: ICD-10-CM

## 2024-04-04 RX ORDER — LEVOTHYROXINE SODIUM 175 UG/1
175 TABLET ORAL DAILY
Qty: 30 TABLET | Refills: 2 | Status: SHIPPED | OUTPATIENT
Start: 2024-04-04

## 2024-04-12 ENCOUNTER — TELEPHONE (OUTPATIENT)
Dept: PSYCHIATRY | Facility: CLINIC | Age: 39
End: 2024-04-12

## 2024-04-12 NOTE — TELEPHONE ENCOUNTER
Contacted patient off of Medication Management  to verify needs of services in attempts to offer patient an appointment at [facility] . LVM for patient to contact intake dept  in regards to wait list

## 2024-07-08 ENCOUNTER — OFFICE VISIT (OUTPATIENT)
Dept: URGENT CARE | Facility: CLINIC | Age: 39
End: 2024-07-08
Payer: COMMERCIAL

## 2024-07-08 VITALS
TEMPERATURE: 97.1 F | SYSTOLIC BLOOD PRESSURE: 122 MMHG | DIASTOLIC BLOOD PRESSURE: 79 MMHG | HEART RATE: 114 BPM | RESPIRATION RATE: 18 BRPM | OXYGEN SATURATION: 98 %

## 2024-07-08 DIAGNOSIS — R05.1 ACUTE COUGH: Primary | ICD-10-CM

## 2024-07-08 PROCEDURE — G0382 LEV 3 HOSP TYPE B ED VISIT: HCPCS | Performed by: PHYSICIAN ASSISTANT

## 2024-07-08 PROCEDURE — S9083 URGENT CARE CENTER GLOBAL: HCPCS | Performed by: PHYSICIAN ASSISTANT

## 2024-07-08 RX ORDER — BENZONATATE 100 MG/1
100 CAPSULE ORAL 3 TIMES DAILY PRN
Qty: 20 CAPSULE | Refills: 0 | Status: SHIPPED | OUTPATIENT
Start: 2024-07-08

## 2024-07-08 RX ORDER — ALBUTEROL SULFATE 90 UG/1
2 AEROSOL, METERED RESPIRATORY (INHALATION) EVERY 6 HOURS PRN
Qty: 8.5 G | Refills: 0 | Status: SHIPPED | OUTPATIENT
Start: 2024-07-08

## 2024-07-08 NOTE — LETTER
July 8, 2024     Patient: Rivas Chauhan   YOB: 1985   Date of Visit: 7/8/2024       To Whom It May Concern:    It is my medical opinion that Rivas Chauhan may return to work on 07/09/2024 .    If you have any questions or concerns, please don't hesitate to call.         Sincerely,        Ayla Avina PA-C    CC: No Recipients

## 2024-07-08 NOTE — PROGRESS NOTES
[facility]        NAME: Rivas Chauhan is a 38 y.o. male  : 1985    MRN: [id number]  DATE: 2024  TIME: 10:13 AM    Assessment and Plan   Acute cough [R05.1]  1. Acute cough  benzonatate (TESSALON PERLES) 100 mg capsule    albuterol (ProAir HFA) 90 mcg/act inhaler    CANCELED: XR chest pa & lateral      Pt presents with acute cough. Lungs clear to auscultation. Discussed albuterol inhaler and tessalon perles. Pt on several psychiatric medications, recommend against steroids. Briefly discussed smoking cessation as irritation from inhalents could be resulting in reactive airway disease and recurrent cough symptoms.       Patient Instructions     Patient Instructions   Take Tessalon Perles as prescribed.   Albuterol inhaler as directed as needed for coughing fits, wheezing or shortness of breath.   Stop smoking.   If symptoms are not improved in 3-5 days, follow-up with PCP.   If symptoms worsen or new symptoms develop, report to the emergency department immediately.     Follow up with PCP in 3-5 days.  Proceed to  ER if symptoms worsen.    If tests have been performed at [facility], our office will contact you with results if changes need to be made to the care plan discussed with you at the visit. You can review your full results on [facility] MyChart.     Chief Complaint     Chief Complaint   Patient presents with    Cough     Cough x2-3 weeks. No other symptoms, not taking anything for symptoms.         History of Present Illness       38 year old male presents with complaint of cough for 2-3 weeks. Pt reports that cough has been dry and just has not gotten better. He denies any runny nose, congestion, and sore throat. Notes that he started smoking 1 ppd one year ago. Similar symptoms in January improved with  Tessalon Perles.     Cough  Pertinent negatives include no chest pain, chills, fever, postnasal drip, rhinorrhea, sore throat, shortness of breath or wheezing.       Review of  Systems   Review of Systems   Constitutional:  Negative for chills and fever.   HENT:  Negative for congestion, postnasal drip, rhinorrhea and sore throat.    Respiratory:  Positive for cough. Negative for shortness of breath and wheezing.    Cardiovascular:  Negative for chest pain.         Current Medications       Current Outpatient Medications:     albuterol (ProAir HFA) 90 mcg/act inhaler, Inhale 2 puffs every 6 (six) hours as needed for wheezing, Disp: 8.5 g, Rfl: 0    benzonatate (TESSALON PERLES) 100 mg capsule, Take 1 capsule (100 mg total) by mouth 3 (three) times a day as needed for cough, Disp: 20 capsule, Rfl: 0    benztropine (COGENTIN) 1 mg tablet, Take 1 tablet (1 mg total) by mouth 2 (two) times a day, Disp: 60 tablet, Rfl: 0    levothyroxine 175 mcg tablet, take 1 tablet by mouth once daily, Disp: 30 tablet, Rfl: 2    loxapine (LOXITANE) 10 mg capsule, , Disp: , Rfl:     OXcarbazepine (TRILEPTAL) 600 mg tablet, Take 600 mg by mouth every 12 (twelve) hours  , Disp: , Rfl:     risperiDONE (RisperDAL) 4 mg tablet, , Disp: , Rfl:     risperiDONE (RisperDAL) 3 mg tablet, Take 3 mg by mouth 2 (two) times a day   (Patient not taking: Reported on 1/15/2024), Disp: , Rfl:     Current Allergies     Allergies as of 07/08/2024    (No Known Allergies)            The following portions of the patient's history were reviewed and updated as appropriate: allergies, current medications, past family history, past medical history, past social history, past surgical history and problem list.     Past Medical History:   Diagnosis Date    Amphetamine abuse ([facility]) 06/24/2018    Anxiety     Bipolar disorder ([facility])     Depression     Disease of thyroid gland     Drug abuse ([facility])     ETOH abuse     Hallucination     Psychiatric illness     Psychosis ([facility])     Schizoaffective disorder, bipolar type ([facility]) 06/30/2018    Schizophrenia ([facility])        Past Surgical History:   Procedure Laterality Date    APPENDECTOMY LAPAROSCOPIC N/A  6/29/2022    Procedure: APPENDECTOMY LAPAROSCOPIC, possible open;  Surgeon: Declan Hill DO;  Location: [facility];  Service: General       No family history on file.      Medications have been verified.        Objective   /79   Pulse (!) 114   Temp (!) 97.1 °F (36.2 °C)   Resp 18   SpO2 98%   No LMP for male patient.       Physical Exam     Physical Exam  Vitals and nursing note reviewed.   Constitutional:       General: He is not in acute distress.     Appearance: Normal appearance. He is not ill-appearing or toxic-appearing.   HENT:      Head: Normocephalic and atraumatic.      Jaw: No trismus.      Right Ear: Hearing, tympanic membrane, ear canal and external ear normal. There is no impacted cerumen. No foreign body.      Left Ear: Hearing, tympanic membrane, ear canal and external ear normal. There is no impacted cerumen. No foreign body.      Nose: No nasal deformity, mucosal edema, congestion or rhinorrhea.      Right Nostril: No foreign body, epistaxis or occlusion.      Left Nostril: No foreign body, epistaxis or occlusion.      Right Turbinates: Not enlarged, swollen or pale.      Left Turbinates: Not enlarged, swollen or pale.      Mouth/Throat:      Lips: Pink. No lesions.      Mouth: Mucous membranes are moist. No injury, oral lesions or angioedema.      Dentition: Normal dentition.      Tongue: No lesions. Tongue does not deviate from midline.      Palate: No mass and lesions.      Pharynx: Uvula midline. No pharyngeal swelling, oropharyngeal exudate, posterior oropharyngeal erythema or uvula swelling.      Tonsils: No tonsillar exudate or tonsillar abscesses.   Eyes:      General: Lids are normal. Gaze aligned appropriately. No allergic shiner.     Extraocular Movements: Extraocular movements intact.   Cardiovascular:      Rate and Rhythm: Normal rate and regular rhythm.      Heart sounds: Normal heart sounds, S1 normal and S2 normal. Heart sounds not distant. No murmur heard.     No  "friction rub. No gallop.   Pulmonary:      Effort: Pulmonary effort is normal.      Breath sounds: Normal breath sounds. No decreased breath sounds, wheezing, rhonchi or rales.      Comments: Patient speaking in full sentences with no increased respiratory effort.   No audible wheezing or stridor.   Lymphadenopathy:      Cervical: No cervical adenopathy.   Skin:     General: Skin is warm and dry.   Neurological:      Mental Status: He is alert and oriented to person, place, and time.      Coordination: Coordination is intact.      Gait: Gait is intact.   Psychiatric:         Attention and Perception: Attention and perception normal.         Mood and Affect: Mood and affect normal.         Speech: Speech normal.         Behavior: Behavior is cooperative.               Note: Portions of this record may have been created with voice recognition software. Occasional wrong word or \"sound a like\" substitutions may have occurred due to the inherent limitations of voice recognition software. Please read the chart carefully and recognize, using context, where substitutions have occurred.*      "

## 2024-07-08 NOTE — PATIENT INSTRUCTIONS
Take Tessalon Perles as prescribed.   Albuterol inhaler as directed as needed for coughing fits, wheezing or shortness of breath.   Stop smoking.   If symptoms are not improved in 3-5 days, follow-up with PCP.   If symptoms worsen or new symptoms develop, report to the emergency department immediately.

## 2024-07-14 DIAGNOSIS — E03.9 HYPOTHYROIDISM, UNSPECIFIED TYPE: ICD-10-CM

## 2024-07-14 RX ORDER — LEVOTHYROXINE SODIUM 175 UG/1
175 TABLET ORAL DAILY
Qty: 30 TABLET | Refills: 5 | Status: SHIPPED | OUTPATIENT
Start: 2024-07-14

## 2024-09-07 ENCOUNTER — OFFICE VISIT (OUTPATIENT)
Dept: URGENT CARE | Facility: CLINIC | Age: 39
End: 2024-09-07
Payer: COMMERCIAL

## 2024-09-07 VITALS
OXYGEN SATURATION: 94 % | HEART RATE: 116 BPM | DIASTOLIC BLOOD PRESSURE: 72 MMHG | TEMPERATURE: 98.3 F | SYSTOLIC BLOOD PRESSURE: 124 MMHG | RESPIRATION RATE: 18 BRPM

## 2024-09-07 DIAGNOSIS — R22.0 FACIAL SWELLING: Primary | ICD-10-CM

## 2024-09-07 PROCEDURE — S9083 URGENT CARE CENTER GLOBAL: HCPCS

## 2024-09-07 PROCEDURE — G0382 LEV 3 HOSP TYPE B ED VISIT: HCPCS

## 2024-09-07 NOTE — PATIENT INSTRUCTIONS
Augmentin as prescribed  Warm compresses to affected area  Follow up with PCP in 3-5 days.  Proceed to  ER if symptoms worsen.

## 2024-09-07 NOTE — PROGRESS NOTES
[facility]        NAME: Rivas Chauhan is a 38 y.o. male  : 1985    MRN: [id number]  DATE: 2024  TIME: 10:59 AM    Assessment and Plan   Facial swelling [R22.0]  1. Facial swelling  amoxicillin-clavulanate (AUGMENTIN) 875-125 mg per tablet        To R cheek.  No known trauma or other signs of infection.  Has not improved with NSAIDs.  Will cover for potential infection and recommend close PCP follow-up    Patient Instructions     Augmentin as prescribed  Warm compresses to affected area  Follow up with PCP in 3-5 days.  Proceed to  ER if symptoms worsen.    If tests are performed, our office will contact you with results only if changes need to made to the care plan discussed with you at the visit. [facility]    Chief Complaint     Chief Complaint   Patient presents with    Edema     Swelling to R cheek. Denies injury/mouth pain. Verbalizes pain with palaption          History of Present Illness       Reports noting swelling to his right cheek over the past few days.  He reports that it was initially painful but that improved with Motrin.  Reports that he has had continued swelling on that side but is not getting worse.  Denies any new allergens, infection, or other associated symptoms.  Denies any fevers, chills, nasal congestion, or dental issues.  Reports some mild pain while palpating the area but otherwise reports it to be asymptomatic        Review of Systems   Review of Systems   Constitutional:  Negative for chills and fever.   HENT:  Positive for facial swelling (R cheek). Negative for congestion, postnasal drip, rhinorrhea, sinus pressure, sore throat and trouble swallowing.    Respiratory:  Negative for cough, chest tightness and shortness of breath.    Cardiovascular:  Negative for chest pain and palpitations.   Gastrointestinal:  Negative for abdominal pain, nausea and vomiting.   Genitourinary:  Negative for difficulty  urinating.   Musculoskeletal:  Negative for myalgias.   Neurological:  Negative for dizziness and headaches.         Current Medications       Current Outpatient Medications:     albuterol (ProAir HFA) 90 mcg/act inhaler, Inhale 2 puffs every 6 (six) hours as needed for wheezing, Disp: 8.5 g, Rfl: 0    amoxicillin-clavulanate (AUGMENTIN) 875-125 mg per tablet, Take 1 tablet by mouth every 12 (twelve) hours for 7 days, Disp: 14 tablet, Rfl: 0    benztropine (COGENTIN) 1 mg tablet, Take 1 tablet (1 mg total) by mouth 2 (two) times a day, Disp: 60 tablet, Rfl: 0    levothyroxine 175 mcg tablet, take 1 tablet by mouth once daily, Disp: 30 tablet, Rfl: 5    loxapine (LOXITANE) 10 mg capsule, , Disp: , Rfl:     OXcarbazepine (TRILEPTAL) 600 mg tablet, Take 600 mg by mouth every 12 (twelve) hours  , Disp: , Rfl:     risperiDONE (RisperDAL) 4 mg tablet, , Disp: , Rfl:     risperiDONE (RisperDAL) 3 mg tablet, Take 3 mg by mouth 2 (two) times a day   (Patient not taking: Reported on 9/7/2024), Disp: , Rfl:     Current Allergies     Allergies as of 09/07/2024    (No Known Allergies)            The following portions of the patient's history were reviewed and updated as appropriate: allergies, current medications, past family history, past medical history, past social history, past surgical history and problem list.     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 No pertinent family  history.      Medications have been verified.        Objective   /72   Pulse (!) 116   Temp 98.3 °F (36.8 °C)   Resp 18   SpO2 94%        Physical Exam     Physical Exam  Constitutional:       General: He is not in acute distress.  HENT:      Head: Normocephalic.        Nose: Nose normal.   Eyes:      Pupils: Pupils are equal, round, and reactive to light.   Cardiovascular:      Rate and Rhythm: Normal rate and regular rhythm.      Pulses: Normal pulses.      Heart sounds: Normal heart sounds.   Pulmonary:      Effort: Pulmonary effort is normal.      Breath sounds: Normal breath sounds.   Abdominal:      General: Abdomen is flat.   Musculoskeletal:         General: Normal range of motion.   Skin:     General: Skin is warm and dry.      Capillary Refill: Capillary refill takes less than 2 seconds.   Neurological:      Mental Status: He is alert and oriented to person, place, and time.

## 2024-11-30 ENCOUNTER — HOSPITAL ENCOUNTER (EMERGENCY)
Facility: HOSPITAL | Age: 39
Discharge: HOME | End: 2024-11-30
Attending: EMERGENCY MEDICINE
Payer: COMMERCIAL

## 2024-11-30 VITALS
SYSTOLIC BLOOD PRESSURE: 129 MMHG | HEART RATE: 88 BPM | RESPIRATION RATE: 19 BRPM | WEIGHT: 226.19 LBS | BODY MASS INDEX: 28.27 KG/M2 | OXYGEN SATURATION: 94 % | DIASTOLIC BLOOD PRESSURE: 80 MMHG | TEMPERATURE: 98 F

## 2024-11-30 DIAGNOSIS — R55 SYNCOPE: Primary | ICD-10-CM

## 2024-11-30 DIAGNOSIS — S09.90XA CLOSED HEAD INJURY, INITIAL ENCOUNTER: ICD-10-CM

## 2024-11-30 PROCEDURE — 99284 EMERGENCY DEPT VISIT MOD MDM: CPT | Performed by: EMERGENCY MEDICINE

## 2024-11-30 PROCEDURE — 93005 ELECTROCARDIOGRAM TRACING: CPT

## 2024-11-30 PROCEDURE — 99284 EMERGENCY DEPT VISIT MOD MDM: CPT

## 2024-11-30 NOTE — Clinical Note
Rivas Chauhan was seen and treated in our emergency department on 11/30/2024.                Diagnosis:     Rivas  is off the rest of the shift today, may return to work on return date.    He may return on this date: 12/01/2024         If you have any questions or concerns, please don't hesitate to call.      Sarah Salamanca, DO    ______________________________           _______________          _______________  Hospital Representative                              Date                                Time

## 2024-11-30 NOTE — ED PROVIDER NOTES
"  ED Disposition       None          Assessment & Plan   {Hyperlinks  Risk Stratification - NIHSS - HEART SCORE - Fill out sepsis note and make sure you call [id number] if severe or septic shock:[id number]}    Medical Decision Making      ED Course as of 11/30/24 1540   Sat Nov 30, 2024   1530 Procedure Note: EKG  Date/Time: 11/30/24 3:30 PM   Interpreted by: Sarah Salamanca D.O.  Indications / Diagnosis: syncope  ECG reviewed by me, the ED Provider: yes   The EKG demonstrates:  Rhythm: normal sinus  Intervals: normal intervals  Axis: normal axis  QRS/Blocks: normal QRS  ST Changes: No acute ST Changes, no STD/LEONIE.       Medications - No data to display    ED Risk Strat Scores                           SBIRT 20yo+      Flowsheet Row Most Recent Value   Initial Alcohol Screen: US AUDIT-C     1. How often do you have a drink containing alcohol? 3 Filed at: 11/30/2024 1527   2. How many drinks containing alcohol do you have on a typical day you are drinking?  2 Filed at: 11/30/2024 1527   Audit-C Score 5 Filed at: 11/30/2024 1527   RICHARD: How many times in the past year have you...    Used an illegal drug or used a prescription medication for non-medical reasons? Never Filed at: 11/30/2024 1527                            History of Present Illness   {Hyperlinks  History (Med, Surg, Fam, Social) - Current Medications - Allergies  :[id number]}    Chief Complaint   Patient presents with    Syncope     Pt presents ems from [facility]. Per ems, \"[place] having [place] coughing fit. Pt coughed so hard he passed out and hit his head on the floor. Pt reports LOC after HS. Reports I was out for a few seconds. -BT.\"       Past Medical History:   Diagnosis Date    Amphetamine abuse (HCC) 06/24/2018    Anxiety     Bipolar disorder (HCC)     Depression     Disease of thyroid gland     Drug abuse (HCC)     ETOH abuse     Hallucination     Psychiatric illness     Psychosis (HCC)     Schizoaffective disorder, " bipolar type (HCC) 06/30/2018    Schizophrenia (HCC)       Past Surgical History:   Procedure Laterality Date    APPENDECTOMY LAPAROSCOPIC N/A 6/29/2022    Procedure: APPENDECTOMY LAPAROSCOPIC, possible open;  Surgeon: Declan Hill DO;  Location: [facility];  Service: General      History reviewed. No pertinent family history.   Social History     Tobacco Use    Smoking status: Every Day     Current packs/day: 1.00     Types: Cigarettes    Smokeless tobacco: Current    Tobacco comments:     vapes   Vaping Use    Vaping status: Former   Substance Use Topics    Alcohol use: Not Currently     Alcohol/week: 15.0 standard drinks of alcohol     Types: 15 Shots of liquor per week     Comment: 5 to 6 shots in a sitting  2-3x a week    Drug use: Not Currently     Types: Methamphetamines, Marijuana     Comment: last used 5 months ago (snort or smoked)      E-Cigarette/Vaping    E-Cigarette Use Former User       E-Cigarette/Vaping Substances      I have reviewed and agree with the history as documented.     HPI    Review of Systems   Constitutional:  Negative for fever.   Eyes:  Negative for visual disturbance.   Respiratory:  Positive for cough. Negative for shortness of breath.    Cardiovascular:  Negative for chest pain.   Gastrointestinal:  Negative for abdominal pain, nausea and vomiting.   Musculoskeletal:  Negative for neck pain.   Neurological:  Positive for syncope. Negative for dizziness and headaches.   All other systems reviewed and are negative.          Objective   {Hyperlinks  Historical Vitals - Historical Labs - Chart Review/Microbiology - Last Echo - Code Status  :[id number]}    ED Triage Vitals [11/30/24 1528]   Temperature Pulse Blood Pressure Respirations SpO2 Patient Position - Orthostatic VS   98 °F (36.7 °C) 93 148/83 18 95 % --      Temp Source Heart Rate Source BP Location FiO2 (%) Pain Score    Oral Monitor -- -- --      Vitals      Date and Time Temp Pulse SpO2 Resp BP Pain Score FACES Pain  Rating User   11/30/24 1530 -- 97 94 % 18 129/77 -- -- TF   11/30/24 1528 98 °F (36.7 °C) 93 95 % 18 148/83 -- -- TF            Physical Exam  Vitals and nursing note reviewed.   Constitutional:       General: He is awake. He is not in acute distress.     Appearance: He is not toxic-appearing.   HENT:      Head: Normocephalic.        Mouth/Throat:      Lips: Pink.      Mouth: Mucous membranes are moist.   Eyes:      General: Vision grossly intact. Gaze aligned appropriately.   Cardiovascular:      Rate and Rhythm: Normal rate and regular rhythm.      Heart sounds: Normal heart sounds.   Pulmonary:      Effort: Pulmonary effort is normal. No respiratory distress.      Breath sounds: Normal breath sounds.   Musculoskeletal:      Cervical back: Full passive range of motion without pain and neck supple.   Skin:     General: Skin is warm and dry.   Neurological:      General: No focal deficit present.      Mental Status: He is alert and oriented to person, place, and time.      GCS: GCS eye subscore is 4. GCS verbal subscore is 5. GCS motor subscore is 6.         Results Reviewed       None            No orders to display       Procedures    ED Medication and Procedure Management   Prior to Admission Medications   Prescriptions Last Dose Informant Patient Reported? Taking?   OXcarbazepine (TRILEPTAL) 600 mg tablet   Yes No   Sig: Take 600 mg by mouth every 12 (twelve) hours     albuterol (ProAir HFA) 90 mcg/act inhaler   No No   Sig: Inhale 2 puffs every 6 (six) hours as needed for wheezing   benztropine (COGENTIN) 1 mg tablet   No No   Sig: Take 1 tablet (1 mg total) by mouth 2 (two) times a day   levothyroxine 175 mcg tablet   No No   Sig: take 1 tablet by mouth once daily   loxapine (LOXITANE) 10 mg capsule   Yes No   risperiDONE (RisperDAL) 3 mg tablet   Yes No   Sig: Take 3 mg by mouth 2 (two) times a day     Patient not taking: Reported on 9/7/2024   risperiDONE (RisperDAL) 4 mg tablet   Yes No       Facility-Administered Medications: None     Patient's Medications   Discharge Prescriptions    No medications on file     No discharge procedures on file.  ED SEPSIS DOCUMENTATION

## 2024-12-01 LAB
ATRIAL RATE: 98 BPM
P AXIS: 68 DEGREES
PR INTERVAL: 176 MS
QRS AXIS: 84 DEGREES
QRSD INTERVAL: 96 MS
QT INTERVAL: 362 MS
QTC INTERVAL: 462 MS
T WAVE AXIS: 65 DEGREES
VENTRICULAR RATE: 98 BPM

## 2024-12-01 PROCEDURE — 93010 ELECTROCARDIOGRAM REPORT: CPT | Performed by: STUDENT IN AN ORGANIZED HEALTH CARE EDUCATION/TRAINING PROGRAM

## 2025-01-06 NOTE — ED PROVIDER NOTES
Time reflects when diagnosis was documented in both MDM as applicable and the Disposition within this note       Time User Action Codes Description Comment    11/30/2024  4:20 PM Sarah Salamanca [R55] Syncope     11/30/2024  4:20 PM Sarah Salamanca [S09.90XA] Closed head injury, initial encounter           ED Disposition       ED Disposition   Discharge    Condition   Stable    Date/Time   Sat Nov 30, 2024  4:20 PM    Comment   Rivas Chauhan discharge to home/self care.                   Assessment & Plan       Medical Decision Making  39-year-old male brought in for evaluation after a syncopal episode.  Presume this syncopal episode was vasovagal in nature as it occurred after the patient had a coughing fit.  On exam, patient with normal vitals, no acute distress.  No external signs of injury.  Patient reports no symptoms at this time.  Patient's EKG shows a normal sinus rhythm without acute ischemic changes or signs of underlying structural abnormalities or arrhythmias.  Patient was monitored in the emergency department and had no recurrence of symptoms and had no arrhythmias noted on telemetry.  Patient discharged home in stable condition with symptomatic care instructions and strict ED return precautions.        ED Course as of 01/06/25 1611   Sat Nov 30, 2024   1530 Procedure Note: EKG  Date/Time: 11/30/24 3:30 PM   Interpreted by: Sarah Salamanca D.O.  Indications / Diagnosis: syncope  ECG reviewed by me, the ED Provider: yes   The EKG demonstrates:  Rhythm: normal sinus  Intervals: normal intervals  Axis: normal axis  QRS/Blocks: normal QRS  ST Changes: No acute ST Changes, no STD/LEONIE.       Medications - No data to display    ED Risk Strat Scores                          SBIRT 20yo+      Flowsheet Row Most Recent Value   Initial Alcohol Screen: US AUDIT-C     1. How often do you have a drink containing alcohol? 3 Filed at: 11/30/2024 1527   2. How many drinks containing alcohol do you have on a typical day  "you are drinking?  2 Filed at: 11/30/2024 1527   Audit-C Score 5 Filed at: 11/30/2024 1527   RICHARD: How many times in the past year have you...    Used an illegal drug or used a prescription medication for non-medical reasons? Never Filed at: 11/30/2024 1527                            History of Present Illness       Chief Complaint   Patient presents with    Syncope     Pt presents ems from [facility]. Per ems, \"[place] when he started having a coughing fit. Pt coughed so hard he passed out and hit his head on the floor. Pt reports LOC after HS. Reports I was out for a few seconds. -BT.\"       Past Medical History:   Diagnosis Date    Amphetamine abuse (HCC) 06/24/2018    Anxiety     Bipolar disorder (HCC)     Depression     Disease of thyroid gland     Drug abuse (HCC)     ETOH abuse     Hallucination     Psychiatric illness     Psychosis (HCC)     Schizoaffective disorder, bipolar type (HCC) 06/30/2018    Schizophrenia (HCC)       Past Surgical History:   Procedure Laterality Date    APPENDECTOMY LAPAROSCOPIC N/A 6/29/2022    Procedure: APPENDECTOMY LAPAROSCOPIC, possible open;  Surgeon: Declan Hill DO;  Location: [facility];  Service: General      History reviewed. No pertinent family history.   Social History     Tobacco Use    Smoking status: Every Day     Current packs/day: 1.00     Types: Cigarettes    Smokeless tobacco: Current    Tobacco comments:     vapes   Vaping Use    Vaping status: Former   Substance Use Topics    Alcohol use: Not Currently     Alcohol/week: 15.0 standard drinks of alcohol     Types: 15 Shots of liquor per week     Comment: 5 to 6 shots in a sitting  2-3x a week    Drug use: Not Currently     Types: Methamphetamines, Marijuana     Comment: last used 5 months ago (snort or smoked)      E-Cigarette/Vaping    E-Cigarette Use Former User       E-Cigarette/Vaping Substances      I have reviewed and agree with the history as documented.     39-year-old male brought in " by EMS from [facility] for evaluation after a syncopal episode.  [place].  He states that he coughed so hard that he began to feel lightheaded, and then passed out.  Patient did hit his head on the ground when he fell.  He believes he was only out for a few seconds.  Patient now reports no symptoms, denies any persistent dizziness, lightheadedness, headaches, chest pain, shortness of breath, abdominal pain, nausea, vomiting, or other concerning symptoms.  Patient is not on any antiplatelet or anticoagulant medications.        Review of Systems   Constitutional:  Negative for fever.   Respiratory:  Positive for cough. Negative for shortness of breath.    Cardiovascular:  Negative for chest pain.   Musculoskeletal:  Negative for neck pain.   Skin:  Negative for wound.   Neurological:  Positive for syncope. Negative for dizziness.   All other systems reviewed and are negative.          Objective       ED Triage Vitals [11/30/24 1528]   Temperature Pulse Blood Pressure Respirations SpO2 Patient Position - Orthostatic VS   98 °F (36.7 °C) 93 148/83 18 95 % --      Temp Source Heart Rate Source BP Location FiO2 (%) Pain Score    Oral Monitor -- -- --      Vitals      Date and Time Temp Pulse SpO2 Resp BP Pain Score FACES Pain Rating User   11/30/24 1600 -- 88 94 % 19 129/80 -- -- BK   11/30/24 1530 -- 97 94 % 18 129/77 -- -- TF   11/30/24 1528 98 °F (36.7 °C) 93 95 % 18 148/83 -- -- TF            Physical Exam  Vitals and nursing note reviewed.   Constitutional:       General: He is awake. He is not in acute distress.     Appearance: He is not toxic-appearing.   HENT:      Head: Normocephalic and atraumatic. No contusion or laceration.   Eyes:      General: Vision grossly intact. Gaze aligned appropriately.   Cardiovascular:      Rate and Rhythm: Normal rate and regular rhythm.      Heart sounds: Normal heart sounds.   Pulmonary:       Effort: Pulmonary effort is normal. No respiratory distress.      Breath sounds: Normal breath sounds.   Musculoskeletal:      Cervical back: Full passive range of motion without pain and neck supple. No spinous process tenderness.   Skin:     General: Skin is warm and dry.   Neurological:      General: No focal deficit present.      Mental Status: He is alert and oriented to person, place, and time.         Results Reviewed       None            No orders to display       Procedures    ED Medication and Procedure Management   Prior to Admission Medications   Prescriptions Last Dose Informant Patient Reported? Taking?   OXcarbazepine (TRILEPTAL) 600 mg tablet   Yes No   Sig: Take 600 mg by mouth every 12 (twelve) hours     albuterol (ProAir HFA) 90 mcg/act inhaler   No No   Sig: Inhale 2 puffs every 6 (six) hours as needed for wheezing   benztropine (COGENTIN) 1 mg tablet   No No   Sig: Take 1 tablet (1 mg total) by mouth 2 (two) times a day   levothyroxine 175 mcg tablet   No No   Sig: take 1 tablet by mouth once daily   loxapine (LOXITANE) 10 mg capsule   Yes No   risperiDONE (RisperDAL) 3 mg tablet   Yes No   Sig: Take 3 mg by mouth 2 (two) times a day     Patient not taking: Reported on 9/7/2024   risperiDONE (RisperDAL) 4 mg tablet   Yes No      Facility-Administered Medications: None     Discharge Medication List as of 11/30/2024  4:20 PM        CONTINUE these medications which have NOT CHANGED    Details   albuterol (ProAir HFA) 90 mcg/act inhaler Inhale 2 puffs every 6 (six) hours as needed for wheezing, Starting Mon 7/8/2024, Normal      benztropine (COGENTIN) 1 mg tablet Take 1 tablet (1 mg total) by mouth 2 (two) times a day, Starting Mon 8/6/2018, Normal      levothyroxine 175 mcg tablet take 1 tablet by mouth once daily, Starting Sun 7/14/2024, Normal      loxapine (LOXITANE) 10 mg capsule Historical Med      OXcarbazepine (TRILEPTAL) 600 mg tablet Take 600 mg by mouth every 12 (twelve) hours  ,  Historical Med      !! risperiDONE (RisperDAL) 3 mg tablet Take 3 mg by mouth 2 (two) times a day  , Historical Med      !! risperiDONE (RisperDAL) 4 mg tablet Historical Med       !! - Potential duplicate medications found. Please discuss with provider.        No discharge procedures on file.  ED SEPSIS DOCUMENTATION   Time reflects when diagnosis was documented in both MDM as applicable and the Disposition within this note       Time User Action Codes Description Comment    11/30/2024  4:20 PM Sarah Salamanca [R55] Syncope     11/30/2024  4:20 PM Sarah Salamanca [S09.90XA] Closed head injury, initial encounter                  Sarah Salamanca, DO  01/06/25 2399

## 2025-01-13 DIAGNOSIS — E03.9 HYPOTHYROIDISM, UNSPECIFIED TYPE: ICD-10-CM

## 2025-01-14 DIAGNOSIS — R73.01 IMPAIRED FASTING GLUCOSE: ICD-10-CM

## 2025-01-14 DIAGNOSIS — Z13.220 NEED FOR LIPID SCREENING: ICD-10-CM

## 2025-01-14 DIAGNOSIS — E03.9 HYPOTHYROIDISM, UNSPECIFIED TYPE: Primary | ICD-10-CM

## 2025-01-14 RX ORDER — LEVOTHYROXINE SODIUM 175 UG/1
175 TABLET ORAL DAILY
Qty: 30 TABLET | Refills: 1 | Status: SHIPPED | OUTPATIENT
Start: 2025-01-14

## 2025-02-02 ENCOUNTER — APPOINTMENT (EMERGENCY)
Dept: RADIOLOGY | Facility: HOSPITAL | Age: 40
End: 2025-02-02
Payer: OTHER MISCELLANEOUS

## 2025-02-02 ENCOUNTER — HOSPITAL ENCOUNTER (EMERGENCY)
Facility: HOSPITAL | Age: 40
Discharge: HOME | End: 2025-02-02
Payer: OTHER MISCELLANEOUS

## 2025-02-02 VITALS
OXYGEN SATURATION: 98 % | WEIGHT: 200 LBS | HEIGHT: 75 IN | RESPIRATION RATE: 20 BRPM | BODY MASS INDEX: 24.87 KG/M2 | HEART RATE: 97 BPM | DIASTOLIC BLOOD PRESSURE: 77 MMHG | TEMPERATURE: 97.5 F | SYSTOLIC BLOOD PRESSURE: 134 MMHG

## 2025-02-02 DIAGNOSIS — S82.202A CLOSED FRACTURE OF LEFT TIBIA AND FIBULA, INITIAL ENCOUNTER: Primary | ICD-10-CM

## 2025-02-02 DIAGNOSIS — S82.402A CLOSED FRACTURE OF LEFT TIBIA AND FIBULA, INITIAL ENCOUNTER: Primary | ICD-10-CM

## 2025-02-02 PROCEDURE — 99285 EMERGENCY DEPT VISIT HI MDM: CPT

## 2025-02-02 PROCEDURE — 73630 X-RAY EXAM OF FOOT: CPT

## 2025-02-02 PROCEDURE — 96374 THER/PROPH/DIAG INJ IV PUSH: CPT

## 2025-02-02 PROCEDURE — 73610 X-RAY EXAM OF ANKLE: CPT

## 2025-02-02 PROCEDURE — 73564 X-RAY EXAM KNEE 4 OR MORE: CPT

## 2025-02-02 PROCEDURE — 73590 X-RAY EXAM OF LOWER LEG: CPT

## 2025-02-02 PROCEDURE — 99283 EMERGENCY DEPT VISIT LOW MDM: CPT

## 2025-02-02 RX ORDER — FENTANYL CITRATE 50 UG/ML
1 INJECTION, SOLUTION INTRAMUSCULAR; INTRAVENOUS ONCE
Refills: 0 | Status: COMPLETED | OUTPATIENT
Start: 2025-02-02 | End: 2025-02-02

## 2025-02-02 RX ORDER — METHOCARBAMOL 500 MG/1
500 TABLET, FILM COATED ORAL 4 TIMES DAILY
Qty: 20 TABLET | Refills: 0 | Status: SHIPPED | OUTPATIENT
Start: 2025-02-02

## 2025-02-02 RX ORDER — HYDROMORPHONE HCL/PF 1 MG/ML
0.5 SYRINGE (ML) INJECTION ONCE
Refills: 0 | Status: COMPLETED | OUTPATIENT
Start: 2025-02-02 | End: 2025-02-02

## 2025-02-02 RX ORDER — FENTANYL CITRATE 50 UG/ML
1 INJECTION, SOLUTION INTRAMUSCULAR; INTRAVENOUS ONCE
Refills: 0 | Status: DISCONTINUED | OUTPATIENT
Start: 2025-02-02 | End: 2025-02-02

## 2025-02-02 RX ORDER — OXYCODONE HYDROCHLORIDE 5 MG/1
5 TABLET ORAL EVERY 6 HOURS PRN
Qty: 8 TABLET | Refills: 0 | Status: SHIPPED | OUTPATIENT
Start: 2025-02-02

## 2025-02-02 RX ADMIN — HYDROMORPHONE HYDROCHLORIDE 0.5 MG: 1 INJECTION, SOLUTION INTRAMUSCULAR; INTRAVENOUS; SUBCUTANEOUS at 07:51

## 2025-02-02 NOTE — DISCHARGE INSTRUCTIONS
Tylenol/Motrin  Robaxin- may make you sleepy  Oxycodone- every 6 hours as needed for severe pain, do not drink alcohol or drive when taking the oxycodone  Rest, Ice, Compression (splint- do not remove), Elevation  No apply weight to left leg  Follow up with orthopedics

## 2025-02-02 NOTE — ED PROVIDER NOTES
Time reflects when diagnosis was documented in both MDM as applicable and the Disposition within this note       Time User Action Codes Description Comment    2/2/2025  8:04 AM YawkeyMissy Add [S82.202A,  S82.402A] Closed fracture of left tibia and fibula, initial encounter           ED Disposition       ED Disposition   Discharge    Condition   Stable    Date/Time   Sun Feb 2, 2025  8:38 AM    Comment   Rivas Chauhan discharge to home/self care.                   Assessment & Plan       Medical Decision Making  The patient was found to have a closed tibia and fibula fracture on x-ray.  The patient is otherwise well-appearing, hemodynamic stable, and shows no evidence of neurovascular injury or compartment syndrome.  Patient was placed in long leg.  Splint care advised and educated to patient.  Crutches given. Advised non-weight bearing. Advised to follow-up with orthopedics.  Return to the ER symptoms worsens or questions or concerns arise at home.          Amount and/or Complexity of Data Reviewed  Radiology: ordered.    Risk  Prescription drug management.             Medications   fentanyl citrate (PF) (FOR EMS ONLY) 100 mcg/2 mL injection 100 mcg (0 mcg Does not apply Given to EMS 2/2/25 9932)   HYDROmorphone (DILAUDID) injection 0.5 mg (0.5 mg Intravenous Given 2/2/25 2411)       ED Risk Strat Scores                                              History of Present Illness       Chief Complaint   Patient presents with    Ankle Injury     Presents from [employer] for a left ankle injury.        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 No pertinent family history.   Social History     Tobacco Use    Smoking status: Every Day     Current packs/day: 1.00     Types: Cigarettes    Smokeless tobacco: Current    Tobacco comments:     vapes   Vaping Use    Vaping status: Former   Substance Use Topics    Alcohol use: Not Currently     Alcohol/week: 15.0 standard drinks of alcohol     Types: 15 Shots of liquor per week     Comment: 5 to 6 shots in a sitting  2-3x a week    Drug use: Not Currently     Types: Methamphetamines, Marijuana     Comment: last used 5 months ago (snort or smoked)      E-Cigarette/Vaping    E-Cigarette Use Former User       E-Cigarette/Vaping Substances      I have reviewed and agree with the history as documented.     38 y/o male patient presents to the ER for evaluation of ankle injury.  Patient states that he works at [employer] when he slid down an ice embankment inverting his left foot.  Patient states that incident occurred approximately half hour prior to arrival.  Patient was given 100 mcg of fentanyl and route by EMTs.  Patient reports his pain was lessened by the fentanyl.  Patient denies head strike, loss of consciousness, blood thinners.  Patient has noted deformity of his left ankle.  There is mild ecchymosis to the anterior aspect of his lower left extremity as well as swelling to his medial and lateral malleolus.  Patient unable to ambulate or apply pressure to his left ankle.  Neurovascularly intact, sensation equal bilaterally.      History provided by:  Patient  Ankle Injury  Associated symptoms: no abdominal pain, no chest pain, no cough, no ear pain, no fever, no rash, no shortness of breath, no sore throat and no vomiting        Review of Systems   Constitutional:  Negative for chills and fever.   HENT:  Negative for ear pain and  sore throat.    Eyes:  Negative for pain and visual disturbance.   Respiratory:  Negative for cough and shortness of breath.    Cardiovascular:  Negative for chest pain and palpitations.   Gastrointestinal:  Negative for abdominal pain and vomiting.   Genitourinary:  Negative for dysuria and hematuria.   Musculoskeletal:  Positive for arthralgias (left ankle pain) and gait problem. Negative for back pain.   Skin:  Negative for color change and rash.   Neurological:  Negative for seizures and syncope.   All other systems reviewed and are negative.          Objective       ED Triage Vitals [02/02/25 0726]   Temperature Pulse Blood Pressure Respirations SpO2 Patient Position - Orthostatic VS   97.5 °F (36.4 °C) 97 134/77 20 98 % Sitting      Temp Source Heart Rate Source BP Location FiO2 (%) Pain Score    Oral Monitor Left arm -- 5      Vitals      Date and Time Temp Pulse SpO2 Resp BP Pain Score FACES Pain Rating User   02/02/25 0751 -- -- -- -- -- 8 -- FH   02/02/25 0726 97.5 °F (36.4 °C) 97 98 % 20 134/77 5 -- FH            Physical Exam  Vitals and nursing note reviewed.   Constitutional:       General: He is not in acute distress.     Appearance: He is well-developed.   HENT:      Head: Normocephalic and atraumatic.      Right Ear: External ear normal.      Left Ear: External ear normal.   Eyes:      Conjunctiva/sclera: Conjunctivae normal.   Cardiovascular:      Rate and Rhythm: Normal rate and regular rhythm.      Pulses: Normal pulses.      Heart sounds: Normal heart sounds.   Pulmonary:      Effort: Pulmonary effort is normal. No respiratory distress.      Breath sounds: Normal breath sounds.   Abdominal:      Palpations: Abdomen is soft.      Tenderness: There is no abdominal tenderness.   Musculoskeletal:         General: No swelling.      Cervical back: Neck supple.      Left ankle: Deformity and ecchymosis present. Tenderness present over the lateral malleolus and medial malleolus. Normal pulse.   Skin:      General: Skin is warm and dry.      Capillary Refill: Capillary refill takes less than 2 seconds.   Neurological:      Mental Status: He is alert.   Psychiatric:         Mood and Affect: Mood normal.         Results Reviewed       None            XR knee 4+ vw left injury    (Results Pending)   XR tibia fibula 2 views LEFT    (Results Pending)   XR ankle 3+ views LEFT    (Results Pending)   XR foot 3+ views LEFT    (Results Pending)       Splint application    Date/Time: 2/2/2025 8:10 AM    Performed by: BRIDGET Anne  Authorized by: BRIDGET Anne        ED Medication and Procedure Management   Prior to Admission Medications   Prescriptions Last Dose Informant Patient Reported? Taking?   OXcarbazepine (TRILEPTAL) 600 mg tablet   Yes No   Sig: Take 600 mg by mouth every 12 (twelve) hours     albuterol (ProAir HFA) 90 mcg/act inhaler   No No   Sig: Inhale 2 puffs every 6 (six) hours as needed for wheezing   benztropine (COGENTIN) 1 mg tablet   No No   Sig: Take 1 tablet (1 mg total) by mouth 2 (two) times a day   levothyroxine 175 mcg tablet   No No   Sig: take 1 tablet by mouth once daily   loxapine (LOXITANE) 10 mg capsule   Yes No   risperiDONE (RisperDAL) 3 mg tablet   Yes No   Sig: Take 3 mg by mouth 2 (two) times a day     Patient not taking: Reported on 9/7/2024   risperiDONE (RisperDAL) 4 mg tablet   Yes No      Facility-Administered Medications: None     Discharge Medication List as of 2/2/2025  8:38 AM        START taking these medications    Details   methocarbamol (ROBAXIN) 500 mg tablet Take 1 tablet (500 mg total) by mouth 4 (four) times a day, Starting Sun 2/2/2025, Normal      oxyCODONE (Roxicodone) 5 immediate release tablet Take 1 tablet (5 mg total) by mouth every 6 (six) hours as needed for severe pain for up to 20 doses Max Daily Amount: 20 mg, Starting Sun 2/2/2025, Normal           CONTINUE these medications which have NOT CHANGED    Details   albuterol (ProAir HFA) 90 mcg/act inhaler  Inhale 2 puffs every 6 (six) hours as needed for wheezing, Starting Mon 7/8/2024, Normal      benztropine (COGENTIN) 1 mg tablet Take 1 tablet (1 mg total) by mouth 2 (two) times a day, Starting Mon 8/6/2018, Normal      levothyroxine 175 mcg tablet take 1 tablet by mouth once daily, Starting Tue 1/14/2025, Normal      loxapine (LOXITANE) 10 mg capsule Historical Med      OXcarbazepine (TRILEPTAL) 600 mg tablet Take 600 mg by mouth every 12 (twelve) hours  , Historical Med      !! risperiDONE (RisperDAL) 3 mg tablet Take 3 mg by mouth 2 (two) times a day  , Historical Med      !! risperiDONE (RisperDAL) 4 mg tablet Historical Med       !! - Potential duplicate medications found. Please discuss with provider.          ED SEPSIS DOCUMENTATION   Time reflects when diagnosis was documented in both MDM as applicable and the Disposition within this note       Time User Action Codes Description Comment    2/2/2025  8:04 AM Missy López Add [S82.202A,  S82.402A] Closed fracture of left tibia and fibula, initial encounter                  BRIDGET Anne  02/02/25 1037

## 2025-02-02 NOTE — Clinical Note
Rivas Chauhan was seen and treated in our emergency department on 2/2/2025.        No work until cleared by Family Doctor/Orthopedics        Diagnosis:     Rivas  .    He may return on this date:          If you have any questions or concerns, please don't hesitate to call.      BRIDGET Anne    ______________________________           _______________          _______________  Hospital Representative                              Date                                Time

## 2025-02-03 ENCOUNTER — TELEPHONE (OUTPATIENT)
Age: 40
End: 2025-02-03

## 2025-02-03 NOTE — TELEPHONE ENCOUNTER
Caller: Patient    Reason for call: Patient calling back to schedule, also open to [place].    Call back#: [phone]

## 2025-02-03 NOTE — TELEPHONE ENCOUNTER
Hello,    Please advise if a forced appointment can be accommodated for the patient:    Call back #: [phone]    Insurance: W/C - Will call back tomorrow when given claim# and adjusters name.     Reason for appointment: Seen in ED fracture of left tibia and fibula DOI 2/02.    Requested doctor and/or location: [place]      Thank you.

## 2025-02-04 ENCOUNTER — HOSPITAL ENCOUNTER (OUTPATIENT)
Dept: CT IMAGING | Facility: HOSPITAL | Age: 40
Discharge: HOME | End: 2025-02-04
Payer: OTHER MISCELLANEOUS

## 2025-02-04 ENCOUNTER — TELEPHONE (OUTPATIENT)
Age: 40
End: 2025-02-04

## 2025-02-04 ENCOUNTER — TELEPHONE (OUTPATIENT)
Dept: OBGYN CLINIC | Facility: CLINIC | Age: 40
End: 2025-02-04

## 2025-02-04 ENCOUNTER — OFFICE VISIT (OUTPATIENT)
Dept: LAB | Facility: HOSPITAL | Age: 40
End: 2025-02-04
Payer: COMMERCIAL

## 2025-02-04 ENCOUNTER — APPOINTMENT (OUTPATIENT)
Dept: LAB | Facility: HOSPITAL | Age: 40
End: 2025-02-04
Payer: COMMERCIAL

## 2025-02-04 ENCOUNTER — PREP FOR PROCEDURE (OUTPATIENT)
Dept: OBGYN CLINIC | Facility: CLINIC | Age: 40
End: 2025-02-04

## 2025-02-04 ENCOUNTER — APPOINTMENT (OUTPATIENT)
Dept: RADIOLOGY | Facility: CLINIC | Age: 40
End: 2025-02-04
Payer: COMMERCIAL

## 2025-02-04 ENCOUNTER — HOSPITAL ENCOUNTER (OUTPATIENT)
Dept: RADIOLOGY | Facility: HOSPITAL | Age: 40
Discharge: HOME | End: 2025-02-04
Payer: COMMERCIAL

## 2025-02-04 ENCOUNTER — OFFICE VISIT (OUTPATIENT)
Dept: OBGYN CLINIC | Facility: CLINIC | Age: 40
End: 2025-02-04
Payer: OTHER MISCELLANEOUS

## 2025-02-04 VITALS — HEIGHT: 75 IN | WEIGHT: 200 LBS | BODY MASS INDEX: 24.87 KG/M2

## 2025-02-04 DIAGNOSIS — S82.402A CLOSED FRACTURE OF LEFT TIBIA AND FIBULA, INITIAL ENCOUNTER: Primary | ICD-10-CM

## 2025-02-04 DIAGNOSIS — M25.571 PAIN, JOINT, ANKLE AND FOOT, RIGHT: ICD-10-CM

## 2025-02-04 DIAGNOSIS — S82.202A CLOSED FRACTURE OF LEFT TIBIA AND FIBULA, INITIAL ENCOUNTER: Primary | ICD-10-CM

## 2025-02-04 DIAGNOSIS — S82.202A CLOSED FRACTURE OF LEFT TIBIA AND FIBULA, INITIAL ENCOUNTER: ICD-10-CM

## 2025-02-04 DIAGNOSIS — S82.402A CLOSED FRACTURE OF LEFT TIBIA AND FIBULA, INITIAL ENCOUNTER: ICD-10-CM

## 2025-02-04 LAB
ANION GAP SERPL CALCULATED.3IONS-SCNC: 9 MMOL/L (ref 4–13)
ATRIAL RATE: 111 BPM
BASOPHILS # BLD AUTO: 0.03 THOUSANDS/ΜL (ref 0–0.1)
BASOPHILS NFR BLD AUTO: 0 % (ref 0–1)
BUN SERPL-MCNC: 10 MG/DL (ref 5–25)
CALCIUM SERPL-MCNC: 9.2 MG/DL (ref 8.4–10.2)
CHLORIDE SERPL-SCNC: 101 MMOL/L (ref 96–108)
CO2 SERPL-SCNC: 26 MMOL/L (ref 21–32)
CREAT SERPL-MCNC: 0.81 MG/DL (ref 0.6–1.3)
EOSINOPHIL # BLD AUTO: 0.05 THOUSAND/ΜL (ref 0–0.61)
EOSINOPHIL NFR BLD AUTO: 1 % (ref 0–6)
ERYTHROCYTE [DISTWIDTH] IN BLOOD BY AUTOMATED COUNT: 13 % (ref 11.6–15.1)
GFR SERPL CREATININE-BSD FRML MDRD: 112 ML/MIN/1.73SQ M
GLUCOSE SERPL-MCNC: 97 MG/DL (ref 65–140)
HCT VFR BLD AUTO: 41.4 % (ref 36.5–49.3)
HGB BLD-MCNC: 14 G/DL (ref 12–17)
IMM GRANULOCYTES # BLD AUTO: 0.02 THOUSAND/UL (ref 0–0.2)
IMM GRANULOCYTES NFR BLD AUTO: 0 % (ref 0–2)
LYMPHOCYTES # BLD AUTO: 1.14 THOUSANDS/ΜL (ref 0.6–4.47)
LYMPHOCYTES NFR BLD AUTO: 14 % (ref 14–44)
MCH RBC QN AUTO: 31.3 PG (ref 26.8–34.3)
MCHC RBC AUTO-ENTMCNC: 33.8 G/DL (ref 31.4–37.4)
MCV RBC AUTO: 93 FL (ref 82–98)
MONOCYTES # BLD AUTO: 0.73 THOUSAND/ΜL (ref 0.17–1.22)
MONOCYTES NFR BLD AUTO: 9 % (ref 4–12)
NEUTROPHILS # BLD AUTO: 6.25 THOUSANDS/ΜL (ref 1.85–7.62)
NEUTS SEG NFR BLD AUTO: 76 % (ref 43–75)
NRBC BLD AUTO-RTO: 0 /100 WBCS
P AXIS: 70 DEGREES
PLATELET # BLD AUTO: 173 THOUSANDS/UL (ref 149–390)
PMV BLD AUTO: 10 FL (ref 8.9–12.7)
POTASSIUM SERPL-SCNC: 3.7 MMOL/L (ref 3.5–5.3)
PR INTERVAL: 154 MS
QRS AXIS: 48 DEGREES
QRSD INTERVAL: 84 MS
QT INTERVAL: 326 MS
QTC INTERVAL: 443 MS
RBC # BLD AUTO: 4.47 MILLION/UL (ref 3.88–5.62)
SODIUM SERPL-SCNC: 136 MMOL/L (ref 135–147)
T WAVE AXIS: 52 DEGREES
VENTRICULAR RATE: 111 BPM
WBC # BLD AUTO: 8.22 THOUSAND/UL (ref 4.31–10.16)

## 2025-02-04 PROCEDURE — 36415 COLL VENOUS BLD VENIPUNCTURE: CPT

## 2025-02-04 PROCEDURE — 93005 ELECTROCARDIOGRAM TRACING: CPT

## 2025-02-04 PROCEDURE — 80048 BASIC METABOLIC PNL TOTAL CA: CPT

## 2025-02-04 PROCEDURE — 73610 X-RAY EXAM OF ANKLE: CPT

## 2025-02-04 PROCEDURE — 93010 ELECTROCARDIOGRAM REPORT: CPT | Performed by: INTERNAL MEDICINE

## 2025-02-04 PROCEDURE — 85025 COMPLETE CBC W/AUTO DIFF WBC: CPT

## 2025-02-04 PROCEDURE — 73700 CT LOWER EXTREMITY W/O DYE: CPT

## 2025-02-04 PROCEDURE — 99203 OFFICE O/P NEW LOW 30 MIN: CPT | Performed by: ORTHOPAEDIC SURGERY

## 2025-02-04 PROCEDURE — 71046 X-RAY EXAM CHEST 2 VIEWS: CPT

## 2025-02-04 RX ORDER — ASPIRIN 325 MG
325 TABLET ORAL DAILY
Qty: 45 TABLET | Refills: 0 | Status: SHIPPED | OUTPATIENT
Start: 2025-02-04 | End: 2025-03-21

## 2025-02-04 RX ORDER — CHLORHEXIDINE GLUCONATE ORAL RINSE 1.2 MG/ML
15 SOLUTION DENTAL ONCE
Status: CANCELLED | OUTPATIENT
Start: 2025-02-04 | End: 2025-02-04

## 2025-02-04 NOTE — H&P (VIEW-ONLY)
Name: Rivas Chauhan      : 1985       MRN: [id number]   Encounter Provider: Prateek Champagne MD   Encounter Date: 25  Encounter department: [facility] [place]       Assessment & Plan  Closed fracture of left tibia and fibula, initial encounter  WC DOI 25  Discussed conservative and surgical intervention with patient at length.  Patient opted for surgical intervention at this time.  Risks and benefits were discussed with patient at length.  Procedure being performed: IM nail fixation left tibial shaft fracture and all other associated procedures   informed consent was obtained at this time. Risks of surgery include but are not limited to infection, nerve/blood vessel damage, malunion, nonunion, malrotation, DVT/PE, need for additional procedures, impaired limb function, loss of limb, death. Pt at increased risk for adverse outcomes secondary to cigarette smoking including but not limited to infection, wound healing complications, DVT/PE, nonunion, malunion  STAT CT ordered of the left lower extremity to evaluate intra-articular extension   Non-weight bearing left lower extremity  Please keep splint dry at all times. Contact office if splint becomes wet or damaged  Begin 325mg aspirin daily as directed for DVT prophylaxis    Over the counter analgesics as needed / directed   Ice / heat as directed   Please discontinue tobacco use until otherwise stated. Failure to do so may result in wound healing complications, suboptimal surgical results, possible limb impairment, loss of limb.   Follow up 2 weeks post operatively,  sutures out,   XR left tibia / fibula       Orders:    Ambulatory Referral to Orthopedic Surgery    CT lower extremity wo contrast left; Future    aspirin 325 mg tablet; Take 1 tablet (325 mg total) by mouth daily Take with food    Case request operating room: INSERTION NAIL IM LEFT TIBIA; Standing    CBC and differential; Future    Basic  metabolic panel; Future    EKG 12 lead; Future    XR chest pa and lateral; Future    Pain, joint, ankle and foot, right    Orders:    XR ankle 3+ vw right; Future       To Do Next Visit:   sutures out,   XR left tibia / fibula     _____________________________________________________  CHIEF COMPLAINT:  Chief Complaint   Patient presents with    Left Leg - Pain         SUBJECTIVE:  Rivas Chauhan is a 39 y.o. male who presents to the office for an evaluation of his left leg.  Patient states that he was working at [employer] on 2/2/2025 and fell down an embankment colliding with a nice [place] with both of his legs.  Patient states he felt pain immediately in his left lower extremity.  Patient was transported to the [facility] emergency room where x-rays were taken showing a displaced midshaft tibia fracture.  Patient was placed into a long-leg splint which she has maintained since his injury.  Patient states that he continues to have pain in the midshaft tibia that becomes worse with direct contact.  Patient denies any prior history of leg pain or injuries.  Patient has been taking oxycodone as needed for pain.  Patient denies any numbness or tingling the leg.  Patient also notes medial lateral ankle pain on the right lower extremity which has not present since his injury as well.  Patient has been able to ambulate on that right lower extremity.  Patient offers no other complaints at this time.      PAST MEDICAL HISTORY:  Past Medical History:   Diagnosis Date    Amphetamine abuse ([facility]) 06/24/2018    Anxiety     Bipolar disorder ([facility])     Depression     Disease of thyroid gland     Drug abuse (HCC)     ETOH abuse     Hallucination     Psychiatric illness     Psychosis ([facility])     Schizoaffective disorder, bipolar type ([facility]) 06/30/2018    Schizophrenia ([facility])        PAST SURGICAL HISTORY:  Past Surgical History:   Procedure Laterality Date    APPENDECTOMY LAPAROSCOPIC N/A 6/29/2022    Procedure: APPENDECTOMY LAPAROSCOPIC,  possible open;  Surgeon: Declan Hill DO;  Location: [facility];  Service: General       FAMILY HISTORY:  History reviewed. No pertinent family history.    SOCIAL HISTORY:  Social History     Tobacco Use    Smoking status: Every Day     Current packs/day: 1.00     Types: Cigarettes    Smokeless tobacco: Current    Tobacco comments:     vapes   Vaping Use    Vaping status: Former   Substance Use Topics    Alcohol use: Not Currently     Alcohol/week: 15.0 standard drinks of alcohol     Types: 15 Shots of liquor per week     Comment: 5 to 6 shots in a sitting  2-3x a week    Drug use: Not Currently     Types: Methamphetamines, Marijuana     Comment: last used 5 months ago (snort or smoked)       MEDICATIONS:    Current Outpatient Medications:     albuterol (ProAir HFA) 90 mcg/act inhaler, Inhale 2 puffs every 6 (six) hours as needed for wheezing, Disp: 8.5 g, Rfl: 0    benztropine (COGENTIN) 1 mg tablet, Take 1 tablet (1 mg total) by mouth 2 (two) times a day, Disp: 60 tablet, Rfl: 0    levothyroxine 175 mcg tablet, take 1 tablet by mouth once daily, Disp: 30 tablet, Rfl: 1    loxapine (LOXITANE) 10 mg capsule, , Disp: , Rfl:     methocarbamol (ROBAXIN) 500 mg tablet, Take 1 tablet (500 mg total) by mouth 4 (four) times a day, Disp: 20 tablet, Rfl: 0    OXcarbazepine (TRILEPTAL) 600 mg tablet, Take 600 mg by mouth every 12 (twelve) hours  , Disp: , Rfl:     oxyCODONE (Roxicodone) 5 immediate release tablet, Take 1 tablet (5 mg total) by mouth every 6 (six) hours as needed for severe pain for up to 20 doses Max Daily Amount: 20 mg, Disp: 8 tablet, Rfl: 0    risperiDONE (RisperDAL) 4 mg tablet, , Disp: , Rfl:     risperiDONE (RisperDAL) 3 mg tablet, Take 3 mg by mouth 2 (two) times a day   (Patient not taking: Reported on 2/4/2025), Disp: , Rfl:     ALLERGIES:  No Known Allergies    LABS:  HgA1c:   Lab Results   Component Value Date    HGBA1C 5.9 (H) 03/05/2024     BMP:   Lab Results   Component Value Date    CALCIUM  "9.0 03/05/2024     06/22/2018    K 3.5 03/05/2024    CO2 25 03/05/2024    CL 99 03/05/2024    BUN 13 03/05/2024    CREATININE 1.19 03/05/2024     CBC: No components found for: \"CBC\"    _____________________________________________________  PHYSICAL EXAMINATION:  Vital signs: Ht 6' 3\" (1.905 m)   Wt 90.7 kg (200 lb)   BMI 25.00 kg/m²   General: No acute distress, awake and alert  Psychiatric: Mood and affect appear appropriate  HEENT: Trachea Midline, No torticollis, no apparent facial trauma  Cardiovascular: No audible murmurs; Extremities appear perfused  Pulmonary: No audible wheezing or stridor  Skin: No open lesions; see further details (if any) below      MUSCULOSKELETAL EXAMINATION:  Bilateral lower extremity examination:  Patient sitting comfortably in the office in no apparent distress   Long-leg splint present with no obvious wear present.  Diffuse welling noted throughout the right ankle.   Tenderness palpation noted over midshaft tibia of the left lower extremity.  Tenderness to palpation noted deltoid, ATFL region of the right ankle  No significant pain with passive stretch of digits  NV intact    _____________________________________________________  STUDIES REVIEWED:  I personally reviewed the images obtained in office today and my independent interpretation is as follows:  Left tibia/fibula x-ray 2 views:  Left fibular head and neck fracture, mildly displaced. Distal tibial metadiaphysis oblique mildly displaced fracture. Suspect posterior malleolus fracture      Right ankle x-ray 3 views:  No acute osseous abnormalities present                 PROCEDURES PERFORMED:  No procedures were performed at this time.                   Pierre Vivar PA-C - assisting  Prateek Champagne MD      "

## 2025-02-04 NOTE — PROGRESS NOTES
Name: Rivas Chauhan      : 1985       MRN: [id number]   Encounter Provider: Prateek Champagne MD   Encounter Date: 25  Encounter department: [facility] [place]       Assessment & Plan  Closed fracture of left tibia and fibula, initial encounter  WC DOI 25  Discussed conservative and surgical intervention with patient at length.  Patient opted for surgical intervention at this time.  Risks and benefits were discussed with patient at length.  Procedure being performed: IM nail fixation left tibial shaft fracture and all other associated procedures   informed consent was obtained at this time. Risks of surgery include but are not limited to infection, nerve/blood vessel damage, malunion, nonunion, malrotation, DVT/PE, need for additional procedures, impaired limb function, loss of limb, death. Pt at increased risk for adverse outcomes secondary to cigarette smoking including but not limited to infection, wound healing complications, DVT/PE, nonunion, malunion  STAT CT ordered of the left lower extremity to evaluate intra-articular extension   Non-weight bearing left lower extremity  Please keep splint dry at all times. Contact office if splint becomes wet or damaged  Begin 325mg aspirin daily as directed for DVT prophylaxis    Over the counter analgesics as needed / directed   Ice / heat as directed   Please discontinue tobacco use until otherwise stated. Failure to do so may result in wound healing complications, suboptimal surgical results, possible limb impairment, loss of limb.   Follow up 2 weeks post operatively,  sutures out,   XR left tibia / fibula       Orders:    Ambulatory Referral to Orthopedic Surgery    CT lower extremity wo contrast left; Future    aspirin 325 mg tablet; Take 1 tablet (325 mg total) by mouth daily Take with food    Case request operating room: INSERTION NAIL IM LEFT TIBIA; Standing    CBC and differential; Future    Basic  metabolic panel; Future    EKG 12 lead; Future    XR chest pa and lateral; Future    Pain, joint, ankle and foot, right    Orders:    XR ankle 3+ vw right; Future       To Do Next Visit:   sutures out,   XR left tibia / fibula     _____________________________________________________  CHIEF COMPLAINT:  Chief Complaint   Patient presents with    Left Leg - Pain         SUBJECTIVE:  Rivas Chauhan is a 39 y.o. male who presents to the office for an evaluation of his left leg.  Patient states that he was working at [employer] on 2/2/2025 and fell down an embankment colliding with a nice [place] with both of his legs.  Patient states he felt pain immediately in his left lower extremity.  Patient was transported to the [place] emergency room where x-rays were taken showing a displaced midshaft tibia fracture.  Patient was placed into a long-leg splint which she has maintained since his injury.  Patient states that he continues to have pain in the midshaft tibia that becomes worse with direct contact.  Patient denies any prior history of leg pain or injuries.  Patient has been taking oxycodone as needed for pain.  Patient denies any numbness or tingling the leg.  Patient also notes medial lateral ankle pain on the right lower extremity which has not present since his injury as well.  Patient has been able to ambulate on that right lower extremity.  Patient offers no other complaints at this time.      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    Anxiety     Bipolar disorder ([facility])     Depression     Disease of thyroid gland     Drug abuse ([facility])     ETOH abuse     Hallucination     Psychiatric illness     Psychosis ([facility])     Schizoaffective disorder, bipolar type ([facility]) 06/30/2018    Schizophrenia ([facility])        PAST SURGICAL HISTORY:  Past Surgical History:   Procedure Laterality Date    APPENDECTOMY LAPAROSCOPIC N/A 6/29/2022    Procedure: APPENDECTOMY LAPAROSCOPIC,  possible open;  Surgeon: Declan Hill DO;  Location: [facility];  Service: General       FAMILY HISTORY:  History reviewed. No pertinent family history.    SOCIAL HISTORY:  Social History     Tobacco Use    Smoking status: Every Day     Current packs/day: 1.00     Types: Cigarettes    Smokeless tobacco: Current    Tobacco comments:     vapes   Vaping Use    Vaping status: Former   Substance Use Topics    Alcohol use: Not Currently     Alcohol/week: 15.0 standard drinks of alcohol     Types: 15 Shots of liquor per week     Comment: 5 to 6 shots in a sitting  2-3x a week    Drug use: Not Currently     Types: Methamphetamines, Marijuana     Comment: last used 5 months ago (snort or smoked)       MEDICATIONS:    Current Outpatient Medications:     albuterol (ProAir HFA) 90 mcg/act inhaler, Inhale 2 puffs every 6 (six) hours as needed for wheezing, Disp: 8.5 g, Rfl: 0    benztropine (COGENTIN) 1 mg tablet, Take 1 tablet (1 mg total) by mouth 2 (two) times a day, Disp: 60 tablet, Rfl: 0    levothyroxine 175 mcg tablet, take 1 tablet by mouth once daily, Disp: 30 tablet, Rfl: 1    loxapine (LOXITANE) 10 mg capsule, , Disp: , Rfl:     methocarbamol (ROBAXIN) 500 mg tablet, Take 1 tablet (500 mg total) by mouth 4 (four) times a day, Disp: 20 tablet, Rfl: 0    OXcarbazepine (TRILEPTAL) 600 mg tablet, Take 600 mg by mouth every 12 (twelve) hours  , Disp: , Rfl:     oxyCODONE (Roxicodone) 5 immediate release tablet, Take 1 tablet (5 mg total) by mouth every 6 (six) hours as needed for severe pain for up to 20 doses Max Daily Amount: 20 mg, Disp: 8 tablet, Rfl: 0    risperiDONE (RisperDAL) 4 mg tablet, , Disp: , Rfl:     risperiDONE (RisperDAL) 3 mg tablet, Take 3 mg by mouth 2 (two) times a day   (Patient not taking: Reported on 2/4/2025), Disp: , Rfl:     ALLERGIES:  No Known Allergies    LABS:  HgA1c:   Lab Results   Component Value Date    HGBA1C 5.9 (H) 03/05/2024     BMP:   Lab Results   Component Value Date    CALCIUM  "9.0 03/05/2024     06/22/2018    K 3.5 03/05/2024    CO2 25 03/05/2024    CL 99 03/05/2024    BUN 13 03/05/2024    CREATININE 1.19 03/05/2024     CBC: No components found for: \"CBC\"    _____________________________________________________  PHYSICAL EXAMINATION:  Vital signs: Ht 6' 3\" (1.905 m)   Wt 90.7 kg (200 lb)   BMI 25.00 kg/m²   General: No acute distress, awake and alert  Psychiatric: Mood and affect appear appropriate  HEENT: Trachea Midline, No torticollis, no apparent facial trauma  Cardiovascular: No audible murmurs; Extremities appear perfused  Pulmonary: No audible wheezing or stridor  Skin: No open lesions; see further details (if any) below      MUSCULOSKELETAL EXAMINATION:  Bilateral lower extremity examination:  Patient sitting comfortably in the office in no apparent distress   Long-leg splint present with no obvious wear present.  Diffuse welling noted throughout the right ankle.   Tenderness palpation noted over midshaft tibia of the left lower extremity.  Tenderness to palpation noted deltoid, ATFL region of the right ankle  No significant pain with passive stretch of digits  NV intact    _____________________________________________________  STUDIES REVIEWED:  I personally reviewed the images obtained in office today and my independent interpretation is as follows:  Left tibia/fibula x-ray 2 views:  Left fibular head and neck fracture, mildly displaced. Distal tibial metadiaphysis oblique mildly displaced fracture. Suspect posterior malleolus fracture      Right ankle x-ray 3 views:  No acute osseous abnormalities present                 PROCEDURES PERFORMED:  No procedures were performed at this time.                   Pierre Vivar PA-C - assisting  Prateek Champagne MD      "

## 2025-02-04 NOTE — PRE-PROCEDURE INSTRUCTIONS
Pre-Surgery Instructions:   Medication Instructions    aspirin 325 mg tablet Instructions provided by MD    benztropine (COGENTIN) 1 mg tablet Take day of surgery.    levothyroxine 175 mcg tablet Take day of surgery.    loxapine (LOXITANE) 10 mg capsule Take day of surgery.    methocarbamol (ROBAXIN) 500 mg tablet Take day of surgery.    OXcarbazepine (TRILEPTAL) 600 mg tablet Take day of surgery.    oxyCODONE (Roxicodone) 5 immediate release tablet Uses PRN- OK to take day of surgery    risperiDONE (RisperDAL) 3 mg tablet Take day of surgery.    Medication instructions for day surgery reviewed. Please use only a sip of water to take your instructed medications. Avoid all over the counter vitamins, supplements and NSAIDS for one week prior to surgery per anesthesia guidelines. Tylenol is ok to take as needed.     You will receive a call one business day prior to surgery with an arrival time and hospital directions. If your surgery is scheduled on a Monday, the hospital will be calling you on the Friday prior to your surgery. If you have not heard from anyone by 8pm, please call the hospital supervisor through the hospital  at [phone]. ([place] [phone] or [place] [phone]).    Do not eat or drink anything after midnight the night before your surgery, including candy, mints, lifesavers, or chewing gum. Do not drink alcohol 24hrs before your surgery. Try not to smoke at least 24hrs before your surgery.       Follow the pre surgery showering instructions as listed in the “My Surgical Experience Booklet” or otherwise provided by your surgeon's office. Do not use a blade to shave the surgical area 1 week before surgery. It is okay to use a clean electric clippers up to 24 hours before surgery. Do not apply any lotions, creams, including makeup, cologne, deodorant, or perfumes after showering on the day of your surgery. Do not use dry shampoo, hair spray, hair gel, or any type of hair products.      No contact lenses, eye make-up, or artificial eyelashes. Remove nail polish, including gel polish, and any artificial, gel, or acrylic nails if possible. Remove all jewelry including rings and body piercing jewelry.     Wear causal clothing that is easy to take on and off. Consider your type of surgery.    Keep any valuables, jewelry, piercings at home. Please bring any specially ordered equipment (sling, braces) if indicated.    Arrange for a responsible person to drive you to and from the hospital on the day of your surgery. Please confirm the visitor policy for the day of your procedure when you receive your phone call with an arrival time.     Call the surgeon's office with any new illnesses, exposures, or additional questions prior to surgery.    Please reference your “My Surgical Experience Booklet” for additional information to prepare for your upcoming surgery.

## 2025-02-04 NOTE — TELEPHONE ENCOUNTER
Caller: Joellen Garrett    Doctor: Dr. Champagne    Reason for call: Joellen from [employer] called back with information for W/C.     : Joellen  Phone #: [phone]  Fax#: [phone]  Claim #: [id number]  Address: [address] [address]  DOI: 2/02/2025    Call back#: [phone]

## 2025-02-04 NOTE — TELEPHONE ENCOUNTER
Left a voicemail message for HR at [facility] that we have patient here who was injured at work and he has no claim information.  Asked her to call back with the claim information.

## 2025-02-04 NOTE — TELEPHONE ENCOUNTER
Caller: Patrick    Doctor: Dr. Champagne    Reason for call: wants to know if he should take the aspirin the day of his SX this Friday?  Please advise   thank you    Call back#: [phone]

## 2025-02-04 NOTE — TELEPHONE ENCOUNTER
Tried calling  Joellen at [phone] to find out the claim address had to leave a message provided call back number [phone]

## 2025-02-05 NOTE — ASSESSMENT & PLAN NOTE
LINDSEY DOI 2/2/25  Discussed conservative and surgical intervention with patient at length.  Patient opted for surgical intervention at this time.  Risks and benefits were discussed with patient at length.  Procedure being performed: IM nail fixation left tibial shaft fracture and all other associated procedures   informed consent was obtained at this time. Risks of surgery include but are not limited to infection, nerve/blood vessel damage, malunion, nonunion, malrotation, DVT/PE, need for additional procedures, impaired limb function, loss of limb, death. Pt at increased risk for adverse outcomes secondary to cigarette smoking including but not limited to infection, wound healing complications, DVT/PE, nonunion, malunion  STAT CT ordered of the left lower extremity to evaluate intra-articular extension   Non-weight bearing left lower extremity  Please keep splint dry at all times. Contact office if splint becomes wet or damaged  Begin 325mg aspirin daily as directed for DVT prophylaxis    Over the counter analgesics as needed / directed   Ice / heat as directed   Please discontinue tobacco use until otherwise stated. Failure to do so may result in wound healing complications, suboptimal surgical results, possible limb impairment, loss of limb.   Follow up 2 weeks post operatively,  sutures out,   XR left tibia / fibula       Orders:    Ambulatory Referral to Orthopedic Surgery    CT lower extremity wo contrast left; Future    aspirin 325 mg tablet; Take 1 tablet (325 mg total) by mouth daily Take with food    Case request operating room: INSERTION NAIL IM LEFT TIBIA; Standing    CBC and differential; Future    Basic metabolic panel; Future    EKG 12 lead; Future    XR chest pa and lateral; Future

## 2025-02-05 NOTE — TELEPHONE ENCOUNTER
Called patient. No answer. I left him a voicemail letting him know that the he should be taking aspirin daily and to hold it the morning of surgery.

## 2025-02-07 ENCOUNTER — APPOINTMENT (OUTPATIENT)
Dept: RADIOLOGY | Facility: HOSPITAL | Age: 40
End: 2025-02-07
Payer: COMMERCIAL

## 2025-02-07 ENCOUNTER — HOSPITAL ENCOUNTER (OUTPATIENT)
Facility: HOSPITAL | Age: 40
Setting detail: OUTPATIENT SURGERY
Discharge: HOME | End: 2025-02-09
Attending: ORTHOPAEDIC SURGERY | Admitting: ORTHOPAEDIC SURGERY
Payer: COMMERCIAL

## 2025-02-07 ENCOUNTER — ANESTHESIA (OUTPATIENT)
Dept: PERIOP | Facility: HOSPITAL | Age: 40
End: 2025-02-07
Payer: COMMERCIAL

## 2025-02-07 ENCOUNTER — ANESTHESIA EVENT (OUTPATIENT)
Dept: PERIOP | Facility: HOSPITAL | Age: 40
End: 2025-02-07
Payer: COMMERCIAL

## 2025-02-07 DIAGNOSIS — Z87.81 S/P TIBIAL FRACTURE: ICD-10-CM

## 2025-02-07 DIAGNOSIS — S82.202A CLOSED FRACTURE OF LEFT TIBIA AND FIBULA, INITIAL ENCOUNTER: Primary | ICD-10-CM

## 2025-02-07 DIAGNOSIS — S82.402A CLOSED FRACTURE OF LEFT TIBIA AND FIBULA, INITIAL ENCOUNTER: Primary | ICD-10-CM

## 2025-02-07 PROCEDURE — C1713 ANCHOR/SCREW BN/BN,TIS/BN: HCPCS | Performed by: ORTHOPAEDIC SURGERY

## 2025-02-07 PROCEDURE — 27769 OPTX POST ANKLE FX: CPT

## 2025-02-07 PROCEDURE — 73590 X-RAY EXAM OF LOWER LEG: CPT

## 2025-02-07 PROCEDURE — 27759 TREATMENT OF TIBIA FRACTURE: CPT

## 2025-02-07 PROCEDURE — 27759 TREATMENT OF TIBIA FRACTURE: CPT | Performed by: ORTHOPAEDIC SURGERY

## 2025-02-07 PROCEDURE — C1769 GUIDE WIRE: HCPCS | Performed by: ORTHOPAEDIC SURGERY

## 2025-02-07 PROCEDURE — 27769 OPTX POST ANKLE FX: CPT | Performed by: ORTHOPAEDIC SURGERY

## 2025-02-07 DEVICE — LOCKING SCREW FOR IM NAIL Ø 5MM/ 34MM/ XL25/ STERILE: Type: IMPLANTABLE DEVICE | Site: TIBIA | Status: FUNCTIONAL

## 2025-02-07 DEVICE — TIBIAL NAIL-ADVANCED / 10MM 390MM / STERILE: Type: IMPLANTABLE DEVICE | Site: LEG | Status: FUNCTIONAL

## 2025-02-07 DEVICE — 2.5MM REAMING ROD WITH BALL TIP/950MM-STERILE: Type: IMPLANTABLE DEVICE | Site: LEG | Status: FUNCTIONAL

## 2025-02-07 DEVICE — LOCKING SCREW FOR IM NAIL Ø 5MM/ 46MM/ XL25/ STERILE: Type: IMPLANTABLE DEVICE | Site: TIBIA | Status: FUNCTIONAL

## 2025-02-07 DEVICE — LOCKING SCREW FOR IM NAIL Ø 5MM/ 44MM/ XL25/ STERILE: Type: IMPLANTABLE DEVICE | Site: TIBIA | Status: FUNCTIONAL

## 2025-02-07 DEVICE — 4.0MM CANNULATED SCREW SHORT THREAD/42MM: Type: IMPLANTABLE DEVICE | Site: LEG | Status: FUNCTIONAL

## 2025-02-07 DEVICE — LOCKING SCREW FOR IM NAIL Ø 5MM/ 42MM/ XL25/ STERILE: Type: IMPLANTABLE DEVICE | Site: TIBIA | Status: FUNCTIONAL

## 2025-02-07 DEVICE — LOCKING SCREW FOR IM NAIL Ø 5MM/ 38MM/ XL25/ STERILE: Type: IMPLANTABLE DEVICE | Site: TIBIA | Status: FUNCTIONAL

## 2025-02-07 RX ORDER — OXYCODONE HYDROCHLORIDE 5 MG/1
5 TABLET ORAL EVERY 4 HOURS PRN
Refills: 0 | Status: DISCONTINUED | OUTPATIENT
Start: 2025-02-07 | End: 2025-02-09 | Stop reason: HOSPADM

## 2025-02-07 RX ORDER — METHOCARBAMOL 500 MG/1
500 TABLET, FILM COATED ORAL 4 TIMES DAILY
Status: DISCONTINUED | OUTPATIENT
Start: 2025-02-07 | End: 2025-02-07

## 2025-02-07 RX ORDER — ACETAMINOPHEN 325 MG/1
975 TABLET ORAL EVERY 8 HOURS
Status: DISCONTINUED | OUTPATIENT
Start: 2025-02-07 | End: 2025-02-09 | Stop reason: HOSPADM

## 2025-02-07 RX ORDER — ENOXAPARIN SODIUM 100 MG/ML
40 INJECTION SUBCUTANEOUS EVERY 12 HOURS SCHEDULED
Status: DISCONTINUED | OUTPATIENT
Start: 2025-02-07 | End: 2025-02-07

## 2025-02-07 RX ORDER — ONDANSETRON 2 MG/ML
INJECTION INTRAMUSCULAR; INTRAVENOUS AS NEEDED
Status: DISCONTINUED | OUTPATIENT
Start: 2025-02-07 | End: 2025-02-07

## 2025-02-07 RX ORDER — OXYCODONE HYDROCHLORIDE 5 MG/1
5 TABLET ORAL EVERY 4 HOURS PRN
Refills: 0 | Status: DISCONTINUED | OUTPATIENT
Start: 2025-02-07 | End: 2025-02-07

## 2025-02-07 RX ORDER — ENOXAPARIN SODIUM 100 MG/ML
40 INJECTION SUBCUTANEOUS
Status: DISCONTINUED | OUTPATIENT
Start: 2025-02-08 | End: 2025-02-07

## 2025-02-07 RX ORDER — MIDAZOLAM HYDROCHLORIDE 2 MG/2ML
INJECTION, SOLUTION INTRAMUSCULAR; INTRAVENOUS AS NEEDED
Status: DISCONTINUED | OUTPATIENT
Start: 2025-02-07 | End: 2025-02-07

## 2025-02-07 RX ORDER — LIDOCAINE HYDROCHLORIDE 20 MG/ML
INJECTION, SOLUTION EPIDURAL; INFILTRATION; INTRACAUDAL; PERINEURAL AS NEEDED
Status: DISCONTINUED | OUTPATIENT
Start: 2025-02-07 | End: 2025-02-07

## 2025-02-07 RX ORDER — LOXAPINE SUCCINATE 5 MG/1
10 TABLET ORAL 2 TIMES DAILY
Status: DISCONTINUED | OUTPATIENT
Start: 2025-02-07 | End: 2025-02-09 | Stop reason: HOSPADM

## 2025-02-07 RX ORDER — ROCURONIUM BROMIDE 10 MG/ML
INJECTION, SOLUTION INTRAVENOUS AS NEEDED
Status: DISCONTINUED | OUTPATIENT
Start: 2025-02-07 | End: 2025-02-07

## 2025-02-07 RX ORDER — DEXAMETHASONE SODIUM PHOSPHATE 10 MG/ML
INJECTION, SOLUTION INTRAMUSCULAR; INTRAVENOUS AS NEEDED
Status: DISCONTINUED | OUTPATIENT
Start: 2025-02-07 | End: 2025-02-07

## 2025-02-07 RX ORDER — METOCLOPRAMIDE HYDROCHLORIDE 5 MG/ML
5 INJECTION INTRAMUSCULAR; INTRAVENOUS ONCE AS NEEDED
Status: DISCONTINUED | OUTPATIENT
Start: 2025-02-07 | End: 2025-02-07 | Stop reason: HOSPADM

## 2025-02-07 RX ORDER — RISPERIDONE 1 MG/1
4 TABLET ORAL 2 TIMES DAILY
Status: DISCONTINUED | OUTPATIENT
Start: 2025-02-07 | End: 2025-02-09 | Stop reason: HOSPADM

## 2025-02-07 RX ORDER — ENOXAPARIN SODIUM 100 MG/ML
40 INJECTION SUBCUTANEOUS EVERY 12 HOURS SCHEDULED
Status: DISCONTINUED | OUTPATIENT
Start: 2025-02-08 | End: 2025-02-09

## 2025-02-07 RX ORDER — ONDANSETRON 2 MG/ML
4 INJECTION INTRAMUSCULAR; INTRAVENOUS ONCE AS NEEDED
Status: DISCONTINUED | OUTPATIENT
Start: 2025-02-07 | End: 2025-02-07 | Stop reason: HOSPADM

## 2025-02-07 RX ORDER — MAGNESIUM HYDROXIDE 1200 MG/15ML
LIQUID ORAL AS NEEDED
Status: DISCONTINUED | OUTPATIENT
Start: 2025-02-07 | End: 2025-02-07 | Stop reason: HOSPADM

## 2025-02-07 RX ORDER — FENTANYL CITRATE 50 UG/ML
INJECTION, SOLUTION INTRAMUSCULAR; INTRAVENOUS AS NEEDED
Status: DISCONTINUED | OUTPATIENT
Start: 2025-02-07 | End: 2025-02-07

## 2025-02-07 RX ORDER — CEFAZOLIN SODIUM 2 G/50ML
2000 SOLUTION INTRAVENOUS ONCE
Status: COMPLETED | OUTPATIENT
Start: 2025-02-07 | End: 2025-02-07

## 2025-02-07 RX ORDER — ONDANSETRON 2 MG/ML
4 INJECTION INTRAMUSCULAR; INTRAVENOUS EVERY 6 HOURS PRN
Status: DISCONTINUED | OUTPATIENT
Start: 2025-02-07 | End: 2025-02-09 | Stop reason: HOSPADM

## 2025-02-07 RX ORDER — LEVOTHYROXINE SODIUM 175 UG/1
175 TABLET ORAL DAILY
Status: DISCONTINUED | OUTPATIENT
Start: 2025-02-08 | End: 2025-02-09 | Stop reason: HOSPADM

## 2025-02-07 RX ORDER — BUPIVACAINE HYDROCHLORIDE 2.5 MG/ML
INJECTION, SOLUTION EPIDURAL; INFILTRATION; INTRACAUDAL AS NEEDED
Status: DISCONTINUED | OUTPATIENT
Start: 2025-02-07 | End: 2025-02-07 | Stop reason: HOSPADM

## 2025-02-07 RX ORDER — ACETAMINOPHEN 325 MG/1
975 TABLET ORAL EVERY 6 HOURS PRN
Status: DISCONTINUED | OUTPATIENT
Start: 2025-02-07 | End: 2025-02-07

## 2025-02-07 RX ORDER — CHLORHEXIDINE GLUCONATE ORAL RINSE 1.2 MG/ML
15 SOLUTION DENTAL ONCE
Status: COMPLETED | OUTPATIENT
Start: 2025-02-07 | End: 2025-02-07

## 2025-02-07 RX ORDER — OXCARBAZEPINE 150 MG/1
600 TABLET, FILM COATED ORAL EVERY 12 HOURS SCHEDULED
Status: DISCONTINUED | OUTPATIENT
Start: 2025-02-07 | End: 2025-02-09 | Stop reason: HOSPADM

## 2025-02-07 RX ORDER — HYDROMORPHONE HCL/PF 1 MG/ML
0.5 SYRINGE (ML) INJECTION
Status: DISCONTINUED | OUTPATIENT
Start: 2025-02-07 | End: 2025-02-07 | Stop reason: HOSPADM

## 2025-02-07 RX ORDER — HYDROMORPHONE HCL/PF 1 MG/ML
SYRINGE (ML) INJECTION AS NEEDED
Status: DISCONTINUED | OUTPATIENT
Start: 2025-02-07 | End: 2025-02-07

## 2025-02-07 RX ORDER — METHOCARBAMOL 500 MG/1
500 TABLET, FILM COATED ORAL EVERY 6 HOURS
Status: DISCONTINUED | OUTPATIENT
Start: 2025-02-07 | End: 2025-02-09 | Stop reason: HOSPADM

## 2025-02-07 RX ORDER — DOCUSATE SODIUM 100 MG/1
100 CAPSULE, LIQUID FILLED ORAL 2 TIMES DAILY
Status: DISCONTINUED | OUTPATIENT
Start: 2025-02-07 | End: 2025-02-09 | Stop reason: HOSPADM

## 2025-02-07 RX ORDER — PROPOFOL 10 MG/ML
INJECTION, EMULSION INTRAVENOUS AS NEEDED
Status: DISCONTINUED | OUTPATIENT
Start: 2025-02-07 | End: 2025-02-07

## 2025-02-07 RX ORDER — HYDROMORPHONE HCL IN WATER/PF 6 MG/30 ML
0.2 PATIENT CONTROLLED ANALGESIA SYRINGE INTRAVENOUS EVERY 4 HOURS PRN
Status: DISCONTINUED | OUTPATIENT
Start: 2025-02-07 | End: 2025-02-09 | Stop reason: HOSPADM

## 2025-02-07 RX ORDER — OXYCODONE HYDROCHLORIDE 10 MG/1
10 TABLET ORAL EVERY 4 HOURS PRN
Refills: 0 | Status: DISCONTINUED | OUTPATIENT
Start: 2025-02-07 | End: 2025-02-09 | Stop reason: HOSPADM

## 2025-02-07 RX ORDER — BENZTROPINE MESYLATE 1 MG/1
1 TABLET ORAL 2 TIMES DAILY PRN
Status: DISCONTINUED | OUTPATIENT
Start: 2025-02-07 | End: 2025-02-09 | Stop reason: HOSPADM

## 2025-02-07 RX ORDER — ENOXAPARIN SODIUM 100 MG/ML
40 INJECTION SUBCUTANEOUS EVERY 12 HOURS SCHEDULED
Status: DISCONTINUED | OUTPATIENT
Start: 2025-02-08 | End: 2025-02-07

## 2025-02-07 RX ORDER — SODIUM CHLORIDE, SODIUM LACTATE, POTASSIUM CHLORIDE, CALCIUM CHLORIDE 600; 310; 30; 20 MG/100ML; MG/100ML; MG/100ML; MG/100ML
125 INJECTION, SOLUTION INTRAVENOUS CONTINUOUS
Status: DISCONTINUED | OUTPATIENT
Start: 2025-02-07 | End: 2025-02-07

## 2025-02-07 RX ORDER — CEFAZOLIN SODIUM 2 G/50ML
2000 SOLUTION INTRAVENOUS EVERY 8 HOURS
Status: COMPLETED | OUTPATIENT
Start: 2025-02-07 | End: 2025-02-08

## 2025-02-07 RX ORDER — ALBUTEROL SULFATE 90 UG/1
2 INHALANT RESPIRATORY (INHALATION) EVERY 6 HOURS PRN
Status: DISCONTINUED | OUTPATIENT
Start: 2025-02-07 | End: 2025-02-09 | Stop reason: HOSPADM

## 2025-02-07 RX ADMIN — PROPOFOL 200 MG: 10 INJECTION, EMULSION INTRAVENOUS at 11:30

## 2025-02-07 RX ADMIN — OXCARBAZEPINE 600 MG: 150 TABLET, FILM COATED ORAL at 21:42

## 2025-02-07 RX ADMIN — ROCURONIUM BROMIDE 50 MG: 10 INJECTION, SOLUTION INTRAVENOUS at 11:31

## 2025-02-07 RX ADMIN — MIDAZOLAM HYDROCHLORIDE 2 MG: 1 INJECTION, SOLUTION INTRAMUSCULAR; INTRAVENOUS at 11:23

## 2025-02-07 RX ADMIN — RISPERIDONE 4 MG: 1 TABLET, FILM COATED ORAL at 18:52

## 2025-02-07 RX ADMIN — CHLORHEXIDINE GLUCONATE 0.12% ORAL RINSE 15 ML: 1.2 LIQUID ORAL at 09:08

## 2025-02-07 RX ADMIN — DEXMEDETOMIDINE HYDROCHLORIDE 8 MCG: 100 INJECTION, SOLUTION, CONCENTRATE INTRAVENOUS at 12:29

## 2025-02-07 RX ADMIN — ACETAMINOPHEN 975 MG: 325 TABLET, FILM COATED ORAL at 18:52

## 2025-02-07 RX ADMIN — OXYCODONE HYDROCHLORIDE 5 MG: 5 TABLET ORAL at 17:14

## 2025-02-07 RX ADMIN — DEXAMETHASONE SODIUM PHOSPHATE 10 MG: 10 INJECTION INTRAMUSCULAR; INTRAVENOUS at 12:52

## 2025-02-07 RX ADMIN — FENTANYL CITRATE 100 MCG: 50 INJECTION, SOLUTION INTRAMUSCULAR; INTRAVENOUS at 11:30

## 2025-02-07 RX ADMIN — FENTANYL CITRATE 50 MCG: 50 INJECTION, SOLUTION INTRAMUSCULAR; INTRAVENOUS at 11:54

## 2025-02-07 RX ADMIN — CEFAZOLIN SODIUM 2000 MG: 2 SOLUTION INTRAVENOUS at 11:30

## 2025-02-07 RX ADMIN — ROCURONIUM BROMIDE 30 MG: 10 INJECTION, SOLUTION INTRAVENOUS at 12:02

## 2025-02-07 RX ADMIN — LOXAPINE 10 MG: 5 CAPSULE ORAL at 21:42

## 2025-02-07 RX ADMIN — DEXMEDETOMIDINE HYDROCHLORIDE 8 MCG: 100 INJECTION, SOLUTION, CONCENTRATE INTRAVENOUS at 12:17

## 2025-02-07 RX ADMIN — HYDROMORPHONE HYDROCHLORIDE 0.5 MG: 1 INJECTION, SOLUTION INTRAMUSCULAR; INTRAVENOUS; SUBCUTANEOUS at 13:35

## 2025-02-07 RX ADMIN — OXYCODONE HYDROCHLORIDE 10 MG: 10 TABLET ORAL at 21:42

## 2025-02-07 RX ADMIN — CEFAZOLIN SODIUM 2000 MG: 2 SOLUTION INTRAVENOUS at 18:43

## 2025-02-07 RX ADMIN — METHOCARBAMOL 500 MG: 500 TABLET ORAL at 18:52

## 2025-02-07 RX ADMIN — DEXMEDETOMIDINE HYDROCHLORIDE 8 MCG: 100 INJECTION, SOLUTION, CONCENTRATE INTRAVENOUS at 12:47

## 2025-02-07 RX ADMIN — ONDANSETRON 4 MG: 2 INJECTION INTRAMUSCULAR; INTRAVENOUS at 12:52

## 2025-02-07 RX ADMIN — LIDOCAINE HYDROCHLORIDE 100 MG: 20 INJECTION, SOLUTION EPIDURAL; INFILTRATION; INTRACAUDAL; PERINEURAL at 11:30

## 2025-02-07 RX ADMIN — FENTANYL CITRATE 50 MCG: 50 INJECTION, SOLUTION INTRAMUSCULAR; INTRAVENOUS at 12:02

## 2025-02-07 RX ADMIN — DEXAMETHASONE SODIUM PHOSPHATE 10 MG: 10 INJECTION INTRAMUSCULAR; INTRAVENOUS at 11:46

## 2025-02-07 RX ADMIN — HYDROMORPHONE HYDROCHLORIDE 0.5 MG: 1 INJECTION, SOLUTION INTRAMUSCULAR; INTRAVENOUS; SUBCUTANEOUS at 14:42

## 2025-02-07 RX ADMIN — DOCUSATE SODIUM 100 MG: 100 CAPSULE, LIQUID FILLED ORAL at 17:05

## 2025-02-07 RX ADMIN — SODIUM CHLORIDE, SODIUM LACTATE, POTASSIUM CHLORIDE, AND CALCIUM CHLORIDE 125 ML/HR: .6; .31; .03; .02 INJECTION, SOLUTION INTRAVENOUS at 08:55

## 2025-02-07 RX ADMIN — SODIUM CHLORIDE, SODIUM LACTATE, POTASSIUM CHLORIDE, AND CALCIUM CHLORIDE 125 ML/HR: .6; .31; .03; .02 INJECTION, SOLUTION INTRAVENOUS at 16:55

## 2025-02-07 RX ADMIN — HYDROMORPHONE HYDROCHLORIDE 0.5 MG: 1 INJECTION, SOLUTION INTRAMUSCULAR; INTRAVENOUS; SUBCUTANEOUS at 13:31

## 2025-02-07 RX ADMIN — ROCURONIUM BROMIDE 30 MG: 10 INJECTION, SOLUTION INTRAVENOUS at 13:08

## 2025-02-07 RX ADMIN — SUGAMMADEX 300 MG: 100 INJECTION, SOLUTION INTRAVENOUS at 13:29

## 2025-02-07 NOTE — DISCHARGE INSTR - AVS FIRST PAGE
Discharge Instructions - Orthopedics  [unfilled]    Weight Bearing Status:                                           Nonweight bearing left lower extremity in splint.    DVT prophylaxis:  Aspirin 325 mg daily as prescribed    Pain:  Continue analgesics as directed  Oxycodone 5 mg as needed for severe pain    Dressing Instructions:   Please keep clean, dry and intact until follow up. If splint gets wet, please call and inform the office.     Appt Instructions:   If you do not have your appointment, please call the clinic at [phone]  Otherwise follow up as scheduled.    Contact the office sooner if you experience any increased numbness/tingling in the extremities.

## 2025-02-07 NOTE — ANESTHESIA PREPROCEDURE EVALUATION
Procedure:  INSERTION NAIL IM LEFT TIBIA (Left: Leg Lower)     1 ppd smoker    Relevant Problems   ENDO   (+) Hypothyroidism      NEURO/PSYCH   (+) Schizophrenia (HCC)      Endocrine   (+) Impaired fasting glucose      Behavioral Health   (+) Amphetamine abuse (HCC) (Last use a few months ago)   (+) ETOH abuse      Orthopedic/Musculoskeletal   (+) Closed fracture of left fibula and tibia        Physical Exam    Airway    Mallampati score: II  TM Distance: >3 FB  Neck ROM: full     Dental       Cardiovascular      Pulmonary      Other Findings        Anesthesia Plan  ASA Score- 3     Anesthesia Type- general with ASA Monitors.         Additional Monitors:     Airway Plan: ETT.    Comment: Recent labs personally reviewed:  Lab Results       Component                Value               Date                       WBC                      8.22                02/04/2025                 HGB                      14.0                02/04/2025                 PLT                      173                 02/04/2025            Lab Results       Component                Value               Date                       NA                       136                 06/22/2018                 K                        3.7                 02/04/2025                 BUN                      10                  02/04/2025                 CREATININE               0.81                02/04/2025            Lab Results       Component                Value               Date                       PTT                      34                  06/29/2022             Lab Results       Component                Value               Date                       INR                      1.11                06/29/2022              Blood type       I, Karli Neumann MD, have personally seen and evaluated the patient prior to anesthetic care.  I have reviewed the pre-anesthetic record, medical history, allergies, medications and any other medical records if  appropriate to the anesthetic care.  If a CRNA is involved in the case, I have reviewed the CRNA assessment, if present, and agree. I consented the patient for general anesthesia with appropriate airway support as indicated. We reviewed the risks associated including PONV, sore throat, allergic reaction to anesthetics and management plan to address these issues. We discussed the indication and risks associated with any invasive monitors that would be placed. We discussed post op pain control and expectations. We discussed rare complications including hypoxia, perioperative cardiac and neurologic events, and death based on the patient's baseline risk. All questions and concerns were addressed.     .       Plan Factors-Exercise tolerance (METS): >4 METS.    Chart reviewed. EKG reviewed. Imaging results reviewed. Existing labs reviewed. Patient summary reviewed.    Patient is a current smoker.  Patient instructed to abstain from smoking on day of procedure. Patient did not smoke on day of surgery.    Obstructive sleep apnea risk education given perioperatively.        Induction- intravenous.    Postoperative Plan-         Informed Consent- Anesthetic plan and risks discussed with patient.  I personally reviewed this patient with the CRNA. Discussed and agreed on the Anesthesia Plan with the CRNA..      NPO Status:  No vitals data found for the desired time range.

## 2025-02-07 NOTE — ANESTHESIA POSTPROCEDURE EVALUATION
Post-Op Assessment Note    CV Status:  Stable    Pain management: adequate       Mental Status:  Alert and awake   Hydration Status:  Euvolemic   PONV Controlled:  Controlled   Airway Patency:  Patent  Two or more mitigation strategies used for obstructive sleep apnea   Post Op Vitals Reviewed: Yes    No anethesia notable event occurred.    Staff: Anesthesiologist           Last Filed PACU Vitals:  Vitals Value Taken Time   Temp 98 °F (36.7 °C) 02/07/25 1407   Pulse 108 02/07/25 1504   /72 02/07/25 1501   Resp 15 02/07/25 1504   SpO2 94 % 02/07/25 1503   Vitals shown include unfiled device data.    Modified [place]:     Vitals Value Taken Time   Activity 2 02/07/25 1500   Respiration 2 02/07/25 1500   Circulation 2 02/07/25 1500   Consciousness 2 02/07/25 1500   Oxygen Saturation 2 02/07/25 1500     Modified [place] Score: 10

## 2025-02-07 NOTE — INTERVAL H&P NOTE
H&P reviewed. After examining the patient I find no changes in the patients condition since the H&P had been written.    Pt will be admitted overnight in observation for elevation/ice, compartment monitoring. May be discharged tomorrow if pain controlled/cleared by PT. Pt with dirt/wear to bottom of splint today. Advised of importance of strict NWB status to lower extremity once surgery occurs/postop period    Vitals:    02/07/25 0851   BP: 119/68   Pulse: (!) 110   Resp: 18   Temp: 97.6 °F (36.4 °C)   SpO2: 96%

## 2025-02-07 NOTE — ANESTHESIA POSTPROCEDURE EVALUATION
Post-Op Assessment Note    CV Status:  Stable  Pain Score: 0    Pain management: adequate       Mental Status:  Alert and awake   PONV Controlled:  None   Airway Patency:  Patent  Two or more mitigation strategies used for obstructive sleep apnea   Post Op Vitals Reviewed: Yes    No anethesia notable event occurred.            Last Filed PACU Vitals:  Vitals Value Taken Time   Temp     Pulse 114 02/07/25 1408   /69 02/07/25 1407   Resp 14 02/07/25 1408   SpO2 98 % 02/07/25 1408   Vitals shown include unfiled device data.

## 2025-02-07 NOTE — PLAN OF CARE
Problem: PAIN - ADULT  Goal: Verbalizes/displays adequate comfort level or baseline comfort level  Description: Interventions:  - Encourage patient to monitor pain and request assistance  - Assess pain using appropriate pain scale  - Administer analgesics based on type and severity of pain and evaluate response  - Implement non-pharmacological measures as appropriate and evaluate response  - Consider cultural and social influences on pain and pain management  - Notify physician/advanced practitioner if interventions unsuccessful or patient reports new pain  Outcome: Progressing     Problem: INFECTION - ADULT  Goal: Absence or prevention of progression during hospitalization  Description: INTERVENTIONS:  - Assess and monitor for signs and symptoms of infection  - Monitor lab/diagnostic results  - Monitor all insertion sites, i.e. indwelling lines, tubes, and drains  - Monitor endotracheal if appropriate and nasal secretions for changes in amount and color  - [place] appropriate cooling/warming therapies per order  - Administer medications as ordered  - Instruct and encourage patient and family to use good hand hygiene technique  - Identify and instruct in appropriate isolation precautions for identified infection/condition  Outcome: Progressing     Problem: SAFETY ADULT  Goal: Patient will remain free of falls  Description: INTERVENTIONS:  - Educate patient/family on patient safety including physical limitations  - Instruct patient to call for assistance with activity   - Consult OT/PT to assist with strengthening/mobility   - Keep Call bell within reach  - Keep bed low and locked with side rails adjusted as appropriate  - Keep care items and personal belongings within reach  - Initiate and maintain comfort rounds  - Make Fall Risk Sign visible to staff  - Offer Toileting every 2 Hours, in advance of need  - Initiate/Maintain bed alarm  - Obtain necessary fall risk management equipment:   - Apply yellow socks and  bracelet for high fall risk patients  - Consider moving patient to room near nurses station  Outcome: Progressing  Goal: Maintain or return to baseline ADL function  Description: INTERVENTIONS:  -  Assess patient's ability to carry out ADLs; assess patient's baseline for ADL function and identify physical deficits which impact ability to perform ADLs (bathing, care of mouth/teeth, toileting, grooming, dressing, etc.)  - Assess/evaluate cause of self-care deficits   - Assess range of motion  - Assess patient's mobility; develop plan if impaired  - Assess patient's need for assistive devices and provide as appropriate  - Encourage maximum independence but intervene and supervise when necessary  - Involve family in performance of ADLs  - Assess for home care needs following discharge   - Consider OT consult to assist with ADL evaluation and planning for discharge  - Provide patient education as appropriate  Outcome: Progressing  Goal: Maintains/Returns to pre admission functional level  Description: INTERVENTIONS:  - Perform AM-PAC 6 Click Basic Mobility/ Daily Activity assessment daily.  - Set and communicate daily mobility goal to care team and patient/family/caregiver.   - Collaborate with rehabilitation services on mobility goals if consulted  - Perform Range of Motion 3 times a day.  - Reposition patient every 2 hours.  - Dangle patient 3 times a day  - Stand patient 3 times a day  - Ambulate patient 3 times a day  - Out of bed to chair 3 times a day   - Out of bed for meals 3 times a day  - Out of bed for toileting  - Record patient progress and toleration of activity level   Outcome: Progressing     Problem: DISCHARGE PLANNING  Goal: Discharge to home or other facility with appropriate resources  Description: INTERVENTIONS:  - Identify barriers to discharge w/patient and caregiver  - Arrange for needed discharge resources and transportation as appropriate  - Identify discharge learning needs (meds, wound care,  etc.)  - Arrange for interpretive services to assist at discharge as needed  - Refer to Case Management Department for coordinating discharge planning if the patient needs post-hospital services based on physician/advanced practitioner order or complex needs related to functional status, cognitive ability, or social support system  Outcome: Progressing     Problem: Knowledge Deficit  Goal: Patient/family/caregiver demonstrates understanding of disease process, treatment plan, medications, and discharge instructions  Description: Complete learning assessment and assess knowledge base.  Interventions:  - Provide teaching at level of understanding  - Provide teaching via preferred learning methods  Outcome: Progressing     Problem: MUSCULOSKELETAL - ADULT  Goal: Maintain or return mobility to safest level of function  Description: INTERVENTIONS:  - Assess patient's ability to carry out ADLs; assess patient's baseline for ADL function and identify physical deficits which impact ability to perform ADLs (bathing, care of mouth/teeth, toileting, grooming, dressing, etc.)  - Assess/evaluate cause of self-care deficits   - Assess range of motion  - Assess patient's mobility  - Assess patient's need for assistive devices and provide as appropriate  - Encourage maximum independence but intervene and supervise when necessary  - Involve family in performance of ADLs  - Assess for home care needs following discharge   - Consider OT consult to assist with ADL evaluation and planning for discharge  - Provide patient education as appropriate  Outcome: Progressing  Goal: Maintain proper alignment of affected body part  Description: INTERVENTIONS:  - Support, maintain and protect limb and body alignment  - Provide patient/ family with appropriate education  Outcome: Progressing

## 2025-02-08 LAB
ALBUMIN SERPL BCG-MCNC: 3.6 G/DL (ref 3.5–5)
ALP SERPL-CCNC: 52 U/L (ref 34–104)
ALT SERPL W P-5'-P-CCNC: 12 U/L (ref 7–52)
ANION GAP SERPL CALCULATED.3IONS-SCNC: 7 MMOL/L (ref 4–13)
AST SERPL W P-5'-P-CCNC: 13 U/L (ref 13–39)
BASOPHILS # BLD AUTO: 0.05 THOUSANDS/ΜL (ref 0–0.1)
BASOPHILS NFR BLD AUTO: 0 % (ref 0–1)
BILIRUB SERPL-MCNC: 0.54 MG/DL (ref 0.2–1)
BUN SERPL-MCNC: 11 MG/DL (ref 5–25)
CALCIUM SERPL-MCNC: 8.5 MG/DL (ref 8.4–10.2)
CHLORIDE SERPL-SCNC: 100 MMOL/L (ref 96–108)
CO2 SERPL-SCNC: 27 MMOL/L (ref 21–32)
CREAT SERPL-MCNC: 0.81 MG/DL (ref 0.6–1.3)
EOSINOPHIL # BLD AUTO: 0.02 THOUSAND/ΜL (ref 0–0.61)
EOSINOPHIL NFR BLD AUTO: 0 % (ref 0–6)
ERYTHROCYTE [DISTWIDTH] IN BLOOD BY AUTOMATED COUNT: 12.8 % (ref 11.6–15.1)
GFR SERPL CREATININE-BSD FRML MDRD: 112 ML/MIN/1.73SQ M
GLUCOSE P FAST SERPL-MCNC: 98 MG/DL (ref 65–99)
GLUCOSE SERPL-MCNC: 98 MG/DL (ref 65–140)
HCT VFR BLD AUTO: 37 % (ref 36.5–49.3)
HGB BLD-MCNC: 12.4 G/DL (ref 12–17)
IMM GRANULOCYTES # BLD AUTO: 0.04 THOUSAND/UL (ref 0–0.2)
IMM GRANULOCYTES NFR BLD AUTO: 0 % (ref 0–2)
LYMPHOCYTES # BLD AUTO: 1.95 THOUSANDS/ΜL (ref 0.6–4.47)
LYMPHOCYTES NFR BLD AUTO: 17 % (ref 14–44)
MCH RBC QN AUTO: 31.2 PG (ref 26.8–34.3)
MCHC RBC AUTO-ENTMCNC: 33.5 G/DL (ref 31.4–37.4)
MCV RBC AUTO: 93 FL (ref 82–98)
MONOCYTES # BLD AUTO: 1.14 THOUSAND/ΜL (ref 0.17–1.22)
MONOCYTES NFR BLD AUTO: 10 % (ref 4–12)
NEUTROPHILS # BLD AUTO: 8.64 THOUSANDS/ΜL (ref 1.85–7.62)
NEUTS SEG NFR BLD AUTO: 73 % (ref 43–75)
NRBC BLD AUTO-RTO: 0 /100 WBCS
PLATELET # BLD AUTO: 328 THOUSANDS/UL (ref 149–390)
PMV BLD AUTO: 9.4 FL (ref 8.9–12.7)
POTASSIUM SERPL-SCNC: 3.7 MMOL/L (ref 3.5–5.3)
PROT SERPL-MCNC: 6.4 G/DL (ref 6.4–8.4)
RBC # BLD AUTO: 3.97 MILLION/UL (ref 3.88–5.62)
SODIUM SERPL-SCNC: 134 MMOL/L (ref 135–147)
WBC # BLD AUTO: 11.84 THOUSAND/UL (ref 4.31–10.16)

## 2025-02-08 PROCEDURE — 85025 COMPLETE CBC W/AUTO DIFF WBC: CPT

## 2025-02-08 PROCEDURE — 80053 COMPREHEN METABOLIC PANEL: CPT

## 2025-02-08 PROCEDURE — 97110 THERAPEUTIC EXERCISES: CPT

## 2025-02-08 PROCEDURE — 97163 PT EVAL HIGH COMPLEX 45 MIN: CPT

## 2025-02-08 PROCEDURE — 99024 POSTOP FOLLOW-UP VISIT: CPT

## 2025-02-08 RX ADMIN — CEFAZOLIN SODIUM 2000 MG: 2 SOLUTION INTRAVENOUS at 11:12

## 2025-02-08 RX ADMIN — ACETAMINOPHEN 975 MG: 325 TABLET, FILM COATED ORAL at 11:12

## 2025-02-08 RX ADMIN — ACETAMINOPHEN 975 MG: 325 TABLET, FILM COATED ORAL at 02:14

## 2025-02-08 RX ADMIN — ENOXAPARIN SODIUM 40 MG: 40 INJECTION SUBCUTANEOUS at 04:40

## 2025-02-08 RX ADMIN — LEVOTHYROXINE SODIUM 175 MCG: 175 TABLET ORAL at 08:58

## 2025-02-08 RX ADMIN — METHOCARBAMOL 500 MG: 500 TABLET ORAL at 12:18

## 2025-02-08 RX ADMIN — METHOCARBAMOL 500 MG: 500 TABLET ORAL at 18:09

## 2025-02-08 RX ADMIN — OXYCODONE HYDROCHLORIDE 10 MG: 10 TABLET ORAL at 06:33

## 2025-02-08 RX ADMIN — DOCUSATE SODIUM 100 MG: 100 CAPSULE, LIQUID FILLED ORAL at 18:09

## 2025-02-08 RX ADMIN — RISPERIDONE 4 MG: 1 TABLET, FILM COATED ORAL at 08:58

## 2025-02-08 RX ADMIN — OXYCODONE HYDROCHLORIDE 10 MG: 10 TABLET ORAL at 11:17

## 2025-02-08 RX ADMIN — ACETAMINOPHEN 975 MG: 325 TABLET, FILM COATED ORAL at 18:09

## 2025-02-08 RX ADMIN — OXCARBAZEPINE 600 MG: 150 TABLET, FILM COATED ORAL at 08:57

## 2025-02-08 RX ADMIN — HYDROMORPHONE HYDROCHLORIDE 0.2 MG: 0.2 INJECTION, SOLUTION INTRAMUSCULAR; INTRAVENOUS; SUBCUTANEOUS at 23:21

## 2025-02-08 RX ADMIN — HYDROMORPHONE HYDROCHLORIDE 0.2 MG: 0.2 INJECTION, SOLUTION INTRAMUSCULAR; INTRAVENOUS; SUBCUTANEOUS at 09:07

## 2025-02-08 RX ADMIN — RISPERIDONE 4 MG: 1 TABLET, FILM COATED ORAL at 18:09

## 2025-02-08 RX ADMIN — LOXAPINE 10 MG: 5 CAPSULE ORAL at 18:33

## 2025-02-08 RX ADMIN — OXYCODONE HYDROCHLORIDE 10 MG: 10 TABLET ORAL at 02:14

## 2025-02-08 RX ADMIN — DOCUSATE SODIUM 100 MG: 100 CAPSULE, LIQUID FILLED ORAL at 08:58

## 2025-02-08 RX ADMIN — NICOTINE 1 PATCH: 7 PATCH, EXTENDED RELEASE TRANSDERMAL at 08:58

## 2025-02-08 RX ADMIN — OXYCODONE HYDROCHLORIDE 10 MG: 10 TABLET ORAL at 20:10

## 2025-02-08 RX ADMIN — METHOCARBAMOL 500 MG: 500 TABLET ORAL at 06:33

## 2025-02-08 RX ADMIN — ENOXAPARIN SODIUM 40 MG: 40 INJECTION SUBCUTANEOUS at 20:10

## 2025-02-08 RX ADMIN — LOXAPINE 10 MG: 5 CAPSULE ORAL at 08:59

## 2025-02-08 RX ADMIN — METHOCARBAMOL 500 MG: 500 TABLET ORAL at 00:55

## 2025-02-08 RX ADMIN — METHOCARBAMOL 500 MG: 500 TABLET ORAL at 23:21

## 2025-02-08 RX ADMIN — OXCARBAZEPINE 600 MG: 150 TABLET, FILM COATED ORAL at 20:10

## 2025-02-08 RX ADMIN — OXYCODONE HYDROCHLORIDE 10 MG: 10 TABLET ORAL at 15:34

## 2025-02-08 RX ADMIN — CEFAZOLIN SODIUM 2000 MG: 2 SOLUTION INTRAVENOUS at 04:40

## 2025-02-08 NOTE — PROGRESS NOTES
"Progress Note - Orthopedics   Name: Rivas Chauhan 39 y.o. male I MRN: [id number]  Unit/Bed#: 2 E 276-01 I Date of Admission: 2/7/2025   Date of Service: 2/8/2025 I Hospital Day: 0    Assessment & Plan  Closed fracture of left fibula and tibia  POD1 IM nail fixation left tibial shaft  NWB LLE in splint  Hgb 12.4  PT/OT for gait training  Multimodal pain control  DVT ppx: Lovenox 40 mg once daily while inpatient, transition to aspirin 325 mg daily outpatient  Regular diet  Dispo: Plan for discharge today if cleared by physical therapy and pain well-controlled    Subjective   39 y.o.male  No acute events, no new complaints. Denies fevers, chills, CP, SOB, N/V, numbness or tingling. Patient reports no issues with urination or bowel movements. Patient states that he has been having moderate pain to the left knee near his suprapatellar incision site.  He notes that he has minimal pain distally. Denies any paresthesias. Planning for discharge today versus tomorrow pending PT clearance and pain control.    Objective :  Temp:  [97.5 °F (36.4 °C)-98 °F (36.7 °C)] 97.5 °F (36.4 °C)  HR:  [] 84  BP: (106-136)/(54-81) 115/72  Resp:  [16-20] 18  SpO2:  [92 %-98 %] 95 %  O2 Device: None (Room air)    Physical Exam  Musculoskeletal: left lower extremity  Splint and dressings clean dry and intact  Exposed skin without erythema or ecchymosis  Compartments soft  Mild tenderness to palpation over suprapatellar incision site  Nontender to palpation elsewhere about the left lower extremity  Fires FHL, EHL  Brisk capillary refill all 5 toes      Lab Results: I have reviewed the following results:  Recent Labs     02/08/25  0432   WBC 11.84*   HGB 12.4   HCT 37.0      BUN 11   CREATININE 0.81     Blood Culture:    Lab Results   Component Value Date    BLOODCX No Growth After 5 Days. 06/29/2022     Wound Culture: No results found for: \"WOUNDCULT\"  "

## 2025-02-08 NOTE — ASSESSMENT & PLAN NOTE
POD1 IM nail fixation left tibial shaft  NWB LLE in splint  Hgb 12.4  PT/OT for gait training  Multimodal pain control  DVT ppx: Lovenox 40 mg once daily while inpatient, transition to aspirin 325 mg daily outpatient  Regular diet  Dispo: Plan for discharge today if cleared by physical therapy and pain well-controlled

## 2025-02-08 NOTE — DISCHARGE INSTR - DIET
Home delivered meal options: Mom's meals or Meals on Wheels (Already prepared for you)  Meal kits delivered to your house: Blue Apron, Hello fresh, Every plate (You prepare on your own)  Protein supplement options: Boost, Ensure, Premier protein drink supplements

## 2025-02-08 NOTE — PLAN OF CARE
Problem: PAIN - ADULT  Goal: Verbalizes/displays adequate comfort level or baseline comfort level  Description: Interventions:  - Encourage patient to monitor pain and request assistance  - Assess pain using appropriate pain scale  - Administer analgesics based on type and severity of pain and evaluate response  - Implement non-pharmacological measures as appropriate and evaluate response  - Consider cultural and social influences on pain and pain management  - Notify physician/advanced practitioner if interventions unsuccessful or patient reports new pain  Outcome: Progressing     Problem: INFECTION - ADULT  Goal: Absence or prevention of progression during hospitalization  Description: INTERVENTIONS:  - Assess and monitor for signs and symptoms of infection  - Monitor lab/diagnostic results  - Monitor all insertion sites, i.e. indwelling lines, tubes, and drains  - Monitor endotracheal if appropriate and nasal secretions for changes in amount and color  - [place] appropriate cooling/warming therapies per order  - Administer medications as ordered  - Instruct and encourage patient and family to use good hand hygiene technique  - Identify and instruct in appropriate isolation precautions for identified infection/condition  Outcome: Progressing     Problem: SAFETY ADULT  Goal: Patient will remain free of falls  Description: INTERVENTIONS:  - Educate patient/family on patient safety including physical limitations  - Instruct patient to call for assistance with activity   - Consult OT/PT to assist with strengthening/mobility   - Keep Call bell within reach  - Keep bed low and locked with side rails adjusted as appropriate  - Keep care items and personal belongings within reach  - Initiate and maintain comfort rounds  - Make Fall Risk Sign visible to staff  - Offer Toileting every 2 Hours, in advance of need  - Initiate/Maintain bed alarm  - Obtain necessary fall risk management equipment: bed alarm, nonskid  socks  - Apply yellow socks and bracelet for high fall risk patients  - Consider moving patient to room near nurses station  Outcome: Progressing  Goal: Maintain or return to baseline ADL function  Description: INTERVENTIONS:  -  Assess patient's ability to carry out ADLs; assess patient's baseline for ADL function and identify physical deficits which impact ability to perform ADLs (bathing, care of mouth/teeth, toileting, grooming, dressing, etc.)  - Assess/evaluate cause of self-care deficits   - Assess range of motion  - Assess patient's mobility; develop plan if impaired  - Assess patient's need for assistive devices and provide as appropriate  - Encourage maximum independence but intervene and supervise when necessary  - Involve family in performance of ADLs  - Assess for home care needs following discharge   - Consider OT consult to assist with ADL evaluation and planning for discharge  - Provide patient education as appropriate  Outcome: Progressing  Goal: Maintains/Returns to pre admission functional level  Description: INTERVENTIONS:  - Perform AM-PAC 6 Click Basic Mobility/ Daily Activity assessment daily.  - Set and communicate daily mobility goal to care team and patient/family/caregiver.   - Collaborate with rehabilitation services on mobility goals if consulted  - Perform Range of Motion 4 times a day.  - Reposition patient every 2 hours.  - Dangle patient 4 times a day  - Stand patient 4 times a day  - Ambulate patient 4 times a day  - Out of bed to chair 4 times a day   - Out of bed for meals 4 times a day  - Out of bed for toileting  - Record patient progress and toleration of activity level   Outcome: Progressing     Problem: DISCHARGE PLANNING  Goal: Discharge to home or other facility with appropriate resources  Description: INTERVENTIONS:  - Identify barriers to discharge w/patient and caregiver  - Arrange for needed discharge resources and transportation as appropriate  - Identify discharge  learning needs (meds, wound care, etc.)  - Arrange for interpretive services to assist at discharge as needed  - Refer to Case Management Department for coordinating discharge planning if the patient needs post-hospital services based on physician/advanced practitioner order or complex needs related to functional status, cognitive ability, or social support system  Outcome: Progressing     Problem: Knowledge Deficit  Goal: Patient/family/caregiver demonstrates understanding of disease process, treatment plan, medications, and discharge instructions  Description: Complete learning assessment and assess knowledge base.  Interventions:  - Provide teaching at level of understanding  - Provide teaching via preferred learning methods  Outcome: Progressing     Problem: MUSCULOSKELETAL - ADULT  Goal: Maintain or return mobility to safest level of function  Description: INTERVENTIONS:  - Assess patient's ability to carry out ADLs; assess patient's baseline for ADL function and identify physical deficits which impact ability to perform ADLs (bathing, care of mouth/teeth, toileting, grooming, dressing, etc.)  - Assess/evaluate cause of self-care deficits   - Assess range of motion  - Assess patient's mobility  - Assess patient's need for assistive devices and provide as appropriate  - Encourage maximum independence but intervene and supervise when necessary  - Involve family in performance of ADLs  - Assess for home care needs following discharge   - Consider OT consult to assist with ADL evaluation and planning for discharge  - Provide patient education as appropriate  Outcome: Progressing  Goal: Maintain proper alignment of affected body part  Description: INTERVENTIONS:  - Support, maintain and protect limb and body alignment  - Provide patient/ family with appropriate education  Outcome: Progressing

## 2025-02-08 NOTE — CASE MANAGEMENT
Case Management Assessment & Discharge Planning Note    Patient name Rivas Chauhan  Location 2 [facility] 276/2 E 276-01 MRN [id number]  : 1985 Date 2025       Current Admission Date: 2025  Current Admission Diagnosis:Closed fracture of left fibula and tibia   Patient Active Problem List    Diagnosis Date Noted Date Diagnosed    ETOH abuse      Closed fracture of left fibula and tibia 2025     Impaired fasting glucose 2023     Acne 2022     Schizophrenia (HCC) 2021     Hypothyroidism 2021     Schizoaffective disorder, bipolar type (HCC) 2018     Amphetamine abuse (HCC) 2018       LOS (days): 0  Geometric Mean LOS (GMLOS) (days):   Days to GMLOS:     OBJECTIVE:              Current admission status: Outpatient Surgery       Preferred Pharmacy:   [id number] - [address] [address]  Phone: [phone] Fax: [phone]    Primary Care Provider: Landon Anne MD    Primary Insurance: BLUE CROSS  Secondary Insurance:     ASSESSMENT:  Active Health Care Proxies       [employer] [facility] Care Representative - Sister In-Law   Primary Phone: [phone] (Mobile)                 Advance Directives  Does patient have a Health Care POA?: No  Was patient offered paperwork?: Yes (not interested)  Does patient currently have a Health Care decision maker?: Yes, please see Health Care Proxy section  Does patient have Advance Directives?: No  Was patient offered paperwork?: Yes         Readmission [facility]  30 Day Readmission: No    Patient Information  Admitted from:: Home  Mental Status: Alert  During Assessment patient was accompanied by: Not accompanied during assessment  Assessment information provided by:: Patient  Primary Caregiver: Self  Support Systems: Family members  County of Residence: [place]  What city do you live in?: [place]  Home entry access options. Select all that apply.: Ramp  Type  of Current Residence: [facility]  Living Arrangements: Lives Alone  Is patient a ?: No    Activities of Daily Living Prior to Admission  Functional Status: Independent  Completes ADLs independently?: Yes  Ambulates independently?: Yes  Does patient use assisted devices?: Yes  Assisted Devices (DME) used: Crutches  Does patient currently own DME?: Yes  What DME does the patient currently own?: Crutches  Does patient have a history of Outpatient Therapy (PT/OT)?: No  Does the patient have a history of Short-Term Rehab?: No  Does patient have a history of HHC?: No  Does patient currently have HHC?: No         Patient Information Continued  Income Source: Employed  Does patient have prescription coverage?: Yes  Does patient receive dialysis treatments?: No  Does patient have a history of substance abuse?: No  Does patient have a history of Mental Health Diagnosis?: Yes (Schizophrenia)  Is patient receiving treatment for mental health?: Yes  Has patient received inpatient treatment related to mental health in the last 2 years?: No         Means of Transportation  Means of Transport to Appts:: Drives Self          DISCHARGE DETAILS:    Discharge planning discussed with:: Patient  Freedom of Choice: Yes  Comments - Freedom of Choice: Pt does not want HHC, no anticipated DC needs, may need walker  CM contacted family/caregiver?: No- see comments                  Requested Home Health Care         Is the patient interested in HHC at discharge?: No    DME Referral Provided  Referral made for DME?: No    Other Referral/Resources/Interventions Provided:  Referral Comments: Pt does not want HHC, no anticipated DC needs, may need walker         Treatment Team Recommendation: Home  Discharge Destination Plan:: Home  Transport at Discharge : Family

## 2025-02-08 NOTE — PLAN OF CARE
Problem: PAIN - ADULT  Goal: Verbalizes/displays adequate comfort level or baseline comfort level  Description: Interventions:  - Encourage patient to monitor pain and request assistance  - Assess pain using appropriate pain scale  - Administer analgesics based on type and severity of pain and evaluate response  - Implement non-pharmacological measures as appropriate and evaluate response  - Consider cultural and social influences on pain and pain management  - Notify physician/advanced practitioner if interventions unsuccessful or patient reports new pain  Outcome: Progressing     Problem: INFECTION - ADULT  Goal: Absence or prevention of progression during hospitalization  Description: INTERVENTIONS:  - Assess and monitor for signs and symptoms of infection  - Monitor lab/diagnostic results  - Monitor all insertion sites, i.e. indwelling lines, tubes, and drains  - Monitor endotracheal if appropriate and nasal secretions for changes in amount and color  - [place] appropriate cooling/warming therapies per order  - Administer medications as ordered  - Instruct and encourage patient and family to use good hand hygiene technique  - Identify and instruct in appropriate isolation precautions for identified infection/condition  Outcome: Progressing

## 2025-02-08 NOTE — PLAN OF CARE
Problem: PHYSICAL THERAPY ADULT  Goal: Performs mobility at highest level of function for planned discharge setting.  See evaluation for individualized goals.  Description: Treatment/Interventions: Functional transfer training, LE strengthening/ROM, Therapeutic exercise, Endurance training, Patient/family training, Equipment eval/education, Bed mobility, Gait training, Compensatory technique education, Continued evaluation, Spoke to MD, OT  Equipment Recommended: Walker       See flowsheet documentation for full assessment, interventions and recommendations.  Outcome: Progressing  Note: Prognosis: Good  Problem List: Decreased strength, Decreased endurance, Decreased mobility, Decreased coordination, Decreased cognition, Decreased safety awareness, Pain, Orthopedic restrictions, Impaired balance  Assessment: Pt is 39 y.o. male seen for PT evaluation s/p admit to [facility] on 2/7/2025 w/ Closed fracture of left fibula and tibia. PT consulted to assess pt's functional mobility and d/c needs. Order placed for PT eval and tx, w/ up as tolerated and NWB L LE order. Comorbidities affecting pt's physical performance at time of assessment include: L ORIF for tibial fracture, smoker, bipolar disorder and schizophrenia, depression and anxiety. PTA, pt was independent w/ all functional mobility w/ out AD, ambulates community distances and elevations, lives alone in single level trailer, and works full time. Personal factors affecting pt at time of IE include: inability to navigate community distances, unable to perform dynamic tasks in community, decreased cognition, anxiety, depression, and inability to perform current job functions. Please find objective findings from PT assessment regarding body systems outlined above with impairments and limitations including weakness, impaired balance, decreased endurance, impaired coordination, gait deviations, decreased activity tolerance, decreased functional mobility  tolerance, decreased safety awareness, and fall risk. The following objective measures performed on IE also reveal limitations: Barthel Index: 85/100, Modified [place]: 2 (slight disability), and AM-PAC 6-Clicks: 23/24. Pt's clinical presentation is currently unstable/unpredictable seen in pt's presentation of history of mental illness, status of living alone and NWB LLE, decreased strength and balance. Pt to benefit from continued PT tx to address deficits as defined above and maximize level of functional independent mobility and consistency. From PT/mobility standpoint, recommendation at time of d/c would be Level 3 Minimum Resource Intensity home with outpatient rehabilitation pending progress in order to facilitate return to PLOF.  Barriers to Discharge: Other (Comment) (Decline in functional mobility)     Rehab Resource Intensity Level, PT: III (Minimum Resource Intensity)    See flowsheet documentation for full assessment.

## 2025-02-08 NOTE — PHYSICAL THERAPY NOTE
Physical Therapy Evaluation     Patient's Name: Rivas Chauhan    Admitting Diagnosis  Closed fracture of left tibia and fibula, initial encounter [S82.202A, S82.402A]    Problem List  Patient Active Problem List   Diagnosis    Amphetamine abuse (HCC)    Schizoaffective disorder, bipolar type (HCC)    Schizophrenia (HCC)    Hypothyroidism    Acne    Impaired fasting glucose    Closed fracture of left fibula and tibia    ETOH abuse       Past Medical History  Past Medical History:   Diagnosis Date    Amphetamine abuse (HCC) 06/24/2018    Anxiety     Bipolar disorder (HCC)     Depression     Disease of thyroid gland     Drug abuse (HCC)     ETOH abuse     Hallucination     Hyperlipidemia     Psychiatric illness     Psychosis (HCC)     Schizoaffective disorder, bipolar type (HCC) 06/30/2018    Schizophrenia (HCC)        Past Surgical History  Past Surgical History:   Procedure Laterality Date    APPENDECTOMY LAPAROSCOPIC N/A 6/29/2022    Procedure: APPENDECTOMY LAPAROSCOPIC, possible open;  Surgeon: Declan Hill DO;  Location: [facility];  Service: General          02/08/25 0811   PT Last Visit   PT Visit Date 02/08/25   Note Type   Note type Evaluation   Pain Assessment   Pain Assessment Tool 0-10   Pain Score 7   Restrictions/Precautions   Weight Bearing Precautions Per Order Yes   LLE Weight Bearing Per Order NWB   Braces or Orthoses Splint   Other Precautions Multiple lines;Fall Risk;Pain   Home Living   Type of Home Mobile home   Home Layout Ramped entrance   Bathroom Shower/Tub Walk-in shower   Bathroom Toilet Raised   Bathroom Equipment Other (Comment)  (None per patient)   Bathroom Accessibility Accessible   Home Equipment Other (Comment)  (None at baseline)   Prior Function   Level of [place] Independent with ADLs;Independent with functional mobility;Independent with IADLS   Lives With Alone   Receives Help From Family  (Brother and sister in law)   IADLs Independent with driving;Independent with  meal prep;Independent with medication management   Falls in the last 6 months 0   Vocational Full time employment  ([employer])   General   Family/Caregiver Present No   Cognition   Overall Cognitive Status WFL   Arousal/Participation Alert   Orientation Level Oriented X4   Memory Within functional limits   Following Commands Follows all commands and directions without difficulty   RLE Assessment   RLE Assessment X   Strength RLE   RLE Overall Strength 4/5   LLE Assessment   LLE Assessment X   Strength LLE   LLE Overall Strength 3/5   L Ankle Plantar Flexion   (unable to assess L ankle and knee secondary to splinting)   Light Touch   LLE Light Touch Grossly intact   Sharp/Dull   RLE Sharp/Dull Grossly intact   Bed Mobility   Supine to Sit 5  Supervision   Additional items Assist x 1;Increased time required;Verbal cues   Sit to Supine 5  Supervision   Additional items Assist x 1;Increased time required;Verbal cues   Transfers   Sit to Stand 5  Supervision   Additional items Assist x 1;Increased time required   Stand to Sit 5  Supervision   Additional items Assist x 1;Increased time required;Verbal cues;Armrests   Ambulation/Elevation   Gait pattern   (NWB LLE)   Gait Assistance 5  Supervision   Additional items Assist x 1;Verbal cues   Assistive Device Rolling walker   Distance 50 ft x 1   Balance   Static Sitting Good   Dynamic Sitting Fair +   Static Standing Fair +   Dynamic Standing Fair   Ambulatory Fair   Endurance Deficit   Endurance Deficit No   Activity Tolerance   Activity Tolerance Patient tolerated treatment well   Nurse Made Aware CATE Acosta made aware   Assessment   Prognosis Good   Problem List Decreased strength;Decreased endurance;Decreased mobility;Decreased coordination;Decreased cognition;Decreased safety awareness;Pain;Orthopedic restrictions;Impaired balance   Assessment Pt is 39 y.o. male seen for PT evaluation s/p admit to [facility] on 2/7/2025 w/ Closed fracture of left fibula  and tibia. PT consulted to assess pt's functional mobility and d/c needs. Order placed for PT eval and tx, w/ up as tolerated and NWB L LE order. Comorbidities affecting pt's physical performance at time of assessment include: L ORIF for tibial fracture, smoker, bipolar disorder and schizophrenia, depression and anxiety. PTA, pt was independent w/ all functional mobility w/ out AD, ambulates community distances and elevations, lives alone in single level trailer, and works full time. Personal factors affecting pt at time of IE include: inability to navigate community distances, unable to perform dynamic tasks in community, decreased cognition, anxiety, depression, and inability to perform current job functions. Please find objective findings from PT assessment regarding body systems outlined above with impairments and limitations including weakness, impaired balance, decreased endurance, impaired coordination, gait deviations, decreased activity tolerance, decreased functional mobility tolerance, decreased safety awareness, and fall risk. The following objective measures performed on IE also reveal limitations: Barthel Index: 85/100, Modified Ovid: 2 (slight disability), and -PAC 6-Clicks: 23/24. Pt's clinical presentation is currently unstable/unpredictable seen in pt's presentation of history of mental illness, status of living alone and NWB LLE, decreased strength and balance. Pt to benefit from continued PT tx to address deficits as defined above and maximize level of functional independent mobility and consistency. From PT/mobility standpoint, recommendation at time of d/c would be Level 3 Minimum Resource Intensity home with outpatient rehabilitation pending progress in order to facilitate return to PLOF.   Barriers to Discharge Other (Comment)  (Decline in functional mobility)   Goals   STG Expiration Date 02/18/25   Short Term Goal #1 In 10 days: Increase bilateral LE strength 1 grade to facilitate  independent mobility, Perform all bed mobility tasks modified independent to decrease caregiver burden, Perform all transfers modified independent to improve independence, Ambulate > 1000 ft. with RW modified independent w/o LOB and w/ normalized gait pattern 100% of the time, and Increase all balance 1 grade to decrease risk for falls   Plan   Treatment/Interventions Functional transfer training;LE strengthening/ROM;Therapeutic exercise;Endurance training;Patient/family training;Equipment eval/education;Bed mobility;Gait training;Compensatory technique education;Continued evaluation;Spoke to MD;OT   PT Frequency 4-6x/wk   Discharge Recommendation   Rehab Resource Intensity Level, PT III (Minimum Resource Intensity)   Equipment Recommended Walker   AM-PAC Basic Mobility Inpatient   Turning in Flat Bed Without Bedrails 4   Lying on Back to Sitting on Edge of Flat Bed Without Bedrails 4   Moving Bed to Chair 4   Standing Up From Chair Using Arms 4   Walk in Room 4   Climb 3-5 Stairs With Railing 3   Basic Mobility Inpatient Raw Score 23   Basic Mobility Standardized Score 50.88   [facility] Highest Level Of Mobility   -HLM Goal 7: Walk 25 feet or more   -HLM Achieved 7: Walk 25 feet or more   Modified [place] Scale   Modified [place] Scale 2   Barthel Index   Feeding 10   Bathing 5   Grooming Score 5   Dressing Score 10   Bladder Score 10   Bowels Score 10   Toilet Use Score 10   Transfers (Bed/Chair) Score 10   Mobility (Level Surface) Score 10   Stairs Score 5   Barthel Index Score 85   Exercises   Hip Flexion Sitting;10 reps;AROM;Bilateral   Knee AROM Extension Supine Sitting;10 reps;AROM;Bilateral   Ankle Pumps Sitting;10 reps;AROM;Right   Squat   (1 arm Chair push up to encourage strengthening for NWB ambulation)   End of Consult   Patient Position at End of Consult Bedside chair;Bed/Chair alarm activated;All needs within reach         rGay Barksdale, LILIAN

## 2025-02-09 VITALS
HEIGHT: 75 IN | DIASTOLIC BLOOD PRESSURE: 67 MMHG | OXYGEN SATURATION: 95 % | HEART RATE: 110 BPM | BODY MASS INDEX: 24.37 KG/M2 | TEMPERATURE: 97.9 F | SYSTOLIC BLOOD PRESSURE: 126 MMHG | RESPIRATION RATE: 18 BRPM | WEIGHT: 195.99 LBS

## 2025-02-09 LAB
DME PARACHUTE DELIVERY DATE REQUESTED: NORMAL
DME PARACHUTE ITEM DESCRIPTION: NORMAL
DME PARACHUTE ORDER STATUS: NORMAL
DME PARACHUTE SUPPLIER NAME: NORMAL
DME PARACHUTE SUPPLIER PHONE: NORMAL

## 2025-02-09 PROCEDURE — NC001 PR NO CHARGE

## 2025-02-09 PROCEDURE — 99024 POSTOP FOLLOW-UP VISIT: CPT

## 2025-02-09 RX ORDER — ACETAMINOPHEN 500 MG
1000 TABLET ORAL EVERY 8 HOURS
Qty: 60 TABLET | Refills: 2 | Status: SHIPPED | OUTPATIENT
Start: 2025-02-09 | End: 2025-03-11

## 2025-02-09 RX ORDER — ENOXAPARIN SODIUM 100 MG/ML
40 INJECTION SUBCUTANEOUS
Status: DISCONTINUED | OUTPATIENT
Start: 2025-02-09 | End: 2025-02-09 | Stop reason: HOSPADM

## 2025-02-09 RX ORDER — DOCUSATE SODIUM 100 MG/1
100 CAPSULE, LIQUID FILLED ORAL 2 TIMES DAILY
Qty: 20 CAPSULE | Refills: 0 | Status: SHIPPED | OUTPATIENT
Start: 2025-02-09 | End: 2025-02-20

## 2025-02-09 RX ORDER — OXYCODONE HYDROCHLORIDE 5 MG/1
5 TABLET ORAL EVERY 4 HOURS PRN
Qty: 30 TABLET | Refills: 0 | Status: SHIPPED | OUTPATIENT
Start: 2025-02-09

## 2025-02-09 RX ADMIN — ACETAMINOPHEN 975 MG: 325 TABLET, FILM COATED ORAL at 10:39

## 2025-02-09 RX ADMIN — LOXAPINE 10 MG: 5 CAPSULE ORAL at 09:08

## 2025-02-09 RX ADMIN — OXCARBAZEPINE 600 MG: 150 TABLET, FILM COATED ORAL at 09:08

## 2025-02-09 RX ADMIN — OXYCODONE HYDROCHLORIDE 10 MG: 10 TABLET ORAL at 15:11

## 2025-02-09 RX ADMIN — METHOCARBAMOL 500 MG: 500 TABLET ORAL at 13:23

## 2025-02-09 RX ADMIN — NICOTINE 1 PATCH: 7 PATCH, EXTENDED RELEASE TRANSDERMAL at 09:08

## 2025-02-09 RX ADMIN — LEVOTHYROXINE SODIUM 175 MCG: 175 TABLET ORAL at 09:08

## 2025-02-09 RX ADMIN — DOCUSATE SODIUM 100 MG: 100 CAPSULE, LIQUID FILLED ORAL at 09:07

## 2025-02-09 RX ADMIN — RISPERIDONE 4 MG: 1 TABLET, FILM COATED ORAL at 09:13

## 2025-02-09 RX ADMIN — OXYCODONE HYDROCHLORIDE 10 MG: 10 TABLET ORAL at 10:39

## 2025-02-09 RX ADMIN — ENOXAPARIN SODIUM 40 MG: 40 INJECTION SUBCUTANEOUS at 09:07

## 2025-02-09 RX ADMIN — METHOCARBAMOL 500 MG: 500 TABLET ORAL at 05:48

## 2025-02-09 RX ADMIN — ACETAMINOPHEN 975 MG: 325 TABLET, FILM COATED ORAL at 01:53

## 2025-02-09 RX ADMIN — OXYCODONE HYDROCHLORIDE 10 MG: 10 TABLET ORAL at 05:48

## 2025-02-09 RX ADMIN — OXYCODONE HYDROCHLORIDE 10 MG: 10 TABLET ORAL at 01:53

## 2025-02-09 NOTE — DISCHARGE SUMMARY
Discharge Summary - Orthopedics   Name: Rivas Chauhan 39 y.o. male I MRN: [id number]  Unit/Bed#: 2 E 276-01 I Date of Admission: 2/7/2025   Date of Service: 2/9/2025 I Hospital Day: 0    Admission Date: 2/7/2025 0827  Discharge Date: 02/09/25  Admitting Diagnosis: Closed fracture of left tibia and fibula, initial encounter [S82.202A, S82.402A]  Discharge Diagnosis:   Medical Problems       Resolved Problems  Date Reviewed: 2/7/2025   None         HPI: 39 y.o. male with history of hypothyroidism, schizophrenia who had an injury on 2/2/25 and was seen by Dr. Champagne in clinic on 2/4/25. Patient was recommended operative management and was scheduled for left tibial IM nail fixation on 2/7/25.  Prior to surgery, the risks and benefits were discussed and informed consent was obtained.    Procedures Performed: IM nail fixation left tibia shaft    Summary of Hospital Course: Patient was taken to the OR on 2/7/25.  Surgery went without complications and patient was discharged to PACU in stable condition and was transferred to the floor.  POD 1 patient was doing well and working with physical therapy.  He was experiencing 8/10 pain near the suprapatellar incision site. POD2 pain improved to 6/10 and patient felt comfortable for discharge home  On discharge date patient was cleared by PT and determined to be safe for discharge.  Daily discussion was had with patient, nursing staff, orthopedic team, and family members if present.  All questions were answered to the patient's satisfaction.    Complications: None     Condition at Discharge: good       Discharge instructions/Information to patient and family:   See After Visit Summary (AVS) for information provided to patient and family.      Provisions for Follow-Up Care:  See after visit summary for information related to follow-up care and any pertinent home health orders.      PCP: Landon Anne MD    Disposition: Home    Planned Readmission: No     Discharge  Medications:  See after visit summary for reconciled discharge medications provided to patient and family.      Discharge Statement:  I have spent a total time of 30 minutes in caring for this patient on the day of the visit/encounter.

## 2025-02-09 NOTE — ASSESSMENT & PLAN NOTE
POD2 IM nail fixation left tibial shaft  NWB LLE in splint  Ice/elevation  PT/OT for gait training  Multimodal pain control  DVT ppx: Lovenox 40 mg once daily while inpatient, transition to aspirin 325 mg daily outpatient  Regular diet  Dispo: Plan for discharge home today.

## 2025-02-09 NOTE — PROGRESS NOTES
"Progress Note - Orthopedics   Name: Rivas Chauhan 39 y.o. male I MRN: [id number]  Unit/Bed#: 2 E 276-01 I Date of Admission: 2/7/2025   Date of Service: 2/9/2025 I Hospital Day: 0    Assessment & Plan  Closed fracture of left fibula and tibia  POD2 IM nail fixation left tibial shaft  NWB LLE in splint  Ice/elevation  PT/OT for gait training  Multimodal pain control  DVT ppx: Lovenox 40 mg once daily while inpatient, transition to aspirin 325 mg daily outpatient  Regular diet  Dispo: Plan for discharge home today.    Subjective   39 y.o.male  No acute events, no new complaints. Denies fevers, chills, CP, SOB, N/V, numbness or tingling. Patient reports no issues with urination or bowel movements. Patient states he continues to have pain near the suprapatellar incision site, but notes that it has improved to 6/10 today compared to 8/10 yesterday.  He notes that he had not been icing lower extremity much.      Objective :  Temp:  [97.9 °F (36.6 °C)-98.4 °F (36.9 °C)] 97.9 °F (36.6 °C)  HR:  [] 86  BP: (101-124)/(54-67) 124/66  Resp:  [18-19] 19  SpO2:  [91 %-93 %] 91 %  O2 Device: None (Room air)    Physical Exam  Musculoskeletal: leftlower extremity  Splint and dressings clean dry and intact  Exposed skin without erythema or ecchymosis  Compartments soft  Mild tenderness to palpation over suprapatellar incision site  Nontender to palpation elsewhere about the left lower extremity  Fires FHL, EHL  Brisk capillary refill all 5 toes      Lab Results: I have reviewed the following results:  Recent Labs     02/08/25  0432   WBC 11.84*   HGB 12.4   HCT 37.0      BUN 11   CREATININE 0.81     Blood Culture:    Lab Results   Component Value Date    BLOODCX No Growth After 5 Days. 06/29/2022     Wound Culture: No results found for: \"WOUNDCULT\"  "

## 2025-02-09 NOTE — PLAN OF CARE
Problem: PAIN - ADULT  Goal: Verbalizes/displays adequate comfort level or baseline comfort level  Description: Interventions:  - Encourage patient to monitor pain and request assistance  - Assess pain using appropriate pain scale  - Administer analgesics based on type and severity of pain and evaluate response  - Implement non-pharmacological measures as appropriate and evaluate response  - Consider cultural and social influences on pain and pain management  - Notify physician/advanced practitioner if interventions unsuccessful or patient reports new pain  Outcome: Progressing     Problem: INFECTION - ADULT  Goal: Absence or prevention of progression during hospitalization  Description: INTERVENTIONS:  - Assess and monitor for signs and symptoms of infection  - Monitor lab/diagnostic results  - Monitor all insertion sites, i.e. indwelling lines, tubes, and drains  - Monitor endotracheal if appropriate and nasal secretions for changes in amount and color  - [place] appropriate cooling/warming therapies per order  - Administer medications as ordered  - Instruct and encourage patient and family to use good hand hygiene technique  - Identify and instruct in appropriate isolation precautions for identified infection/condition  Outcome: Progressing     Problem: SAFETY ADULT  Goal: Patient will remain free of falls  Description: INTERVENTIONS:  - Educate patient/family on patient safety including physical limitations  - Instruct patient to call for assistance with activity   - Consult OT/PT to assist with strengthening/mobility   - Keep Call bell within reach  - Keep bed low and locked with side rails adjusted as appropriate  - Keep care items and personal belongings within reach  - Initiate and maintain comfort rounds  - Make Fall Risk Sign visible to staff  - Offer Toileting every 2 Hours, in advance of need  - Initiate/Maintain bed alarm  - Obtain necessary fall risk management equipment: bed alarm, nonskid  socks  - Apply yellow socks and bracelet for high fall risk patients  - Consider moving patient to room near nurses station  Outcome: Progressing  Goal: Maintain or return to baseline ADL function  Description: INTERVENTIONS:  -  Assess patient's ability to carry out ADLs; assess patient's baseline for ADL function and identify physical deficits which impact ability to perform ADLs (bathing, care of mouth/teeth, toileting, grooming, dressing, etc.)  - Assess/evaluate cause of self-care deficits   - Assess range of motion  - Assess patient's mobility; develop plan if impaired  - Assess patient's need for assistive devices and provide as appropriate  - Encourage maximum independence but intervene and supervise when necessary  - Involve family in performance of ADLs  - Assess for home care needs following discharge   - Consider OT consult to assist with ADL evaluation and planning for discharge  - Provide patient education as appropriate  Outcome: Progressing  Goal: Maintains/Returns to pre admission functional level  Description: INTERVENTIONS:  - Perform AM-PAC 6 Click Basic Mobility/ Daily Activity assessment daily.  - Set and communicate daily mobility goal to care team and patient/family/caregiver.   - Collaborate with rehabilitation services on mobility goals if consulted  - Perform Range of Motion 4 times a day.  - Reposition patient every 2 hours.  - Dangle patient 4 times a day  - Stand patient 4 times a day  - Ambulate patient 4 times a day  - Out of bed to chair 4 times a day   - Out of bed for meals 4 times a day  - Out of bed for toileting  - Record patient progress and toleration of activity level   Outcome: Progressing     Problem: DISCHARGE PLANNING  Goal: Discharge to home or other facility with appropriate resources  Description: INTERVENTIONS:  - Identify barriers to discharge w/patient and caregiver  - Arrange for needed discharge resources and transportation as appropriate  - Identify discharge  learning needs (meds, wound care, etc.)  - Arrange for interpretive services to assist at discharge as needed  - Refer to Case Management Department for coordinating discharge planning if the patient needs post-hospital services based on physician/advanced practitioner order or complex needs related to functional status, cognitive ability, or social support system  Outcome: Progressing     Problem: Knowledge Deficit  Goal: Patient/family/caregiver demonstrates understanding of disease process, treatment plan, medications, and discharge instructions  Description: Complete learning assessment and assess knowledge base.  Interventions:  - Provide teaching at level of understanding  - Provide teaching via preferred learning methods  Outcome: Progressing     Problem: MUSCULOSKELETAL - ADULT  Goal: Maintain or return mobility to safest level of function  Description: INTERVENTIONS:  - Assess patient's ability to carry out ADLs; assess patient's baseline for ADL function and identify physical deficits which impact ability to perform ADLs (bathing, care of mouth/teeth, toileting, grooming, dressing, etc.)  - Assess/evaluate cause of self-care deficits   - Assess range of motion  - Assess patient's mobility  - Assess patient's need for assistive devices and provide as appropriate  - Encourage maximum independence but intervene and supervise when necessary  - Involve family in performance of ADLs  - Assess for home care needs following discharge   - Consider OT consult to assist with ADL evaluation and planning for discharge  - Provide patient education as appropriate  Outcome: Progressing  Goal: Maintain proper alignment of affected body part  Description: INTERVENTIONS:  - Support, maintain and protect limb and body alignment  - Provide patient/ family with appropriate education  Outcome: Progressing

## 2025-02-09 NOTE — CASE MANAGEMENT
Case Management Discharge Planning Note    Patient name Rivas Chauhan  Location 2 [facility] 276/2 E 276-01 MRN [id number]  : 1985 Date 2025       Current Admission Date: 2025  Current Admission Diagnosis:Closed fracture of left fibula and tibia   Patient Active Problem List    Diagnosis Date Noted Date Diagnosed    ETOH abuse      Closed fracture of left fibula and tibia 2025     Impaired fasting glucose 2023     Acne 2022     Schizophrenia (HCC) 2021     Hypothyroidism 2021     Schizoaffective disorder, bipolar type (HCC) 2018     Amphetamine abuse (HCC) 2018       LOS (days): 0  Geometric Mean LOS (GMLOS) (days):   Days to GMLOS:     OBJECTIVE:            Current admission status: Outpatient Surgery   Preferred Pharmacy:   [id number] [address] [address]  Phone: [phone] Fax: [phone]    Primary Care Provider: Landon Anne MD    Primary Insurance: BLUE CROSS  Secondary Insurance:     DISCHARGE DETAILS:    Discharge planning discussed with:: patient  Freedom of Choice: Yes  Comments - Freedom of Choice: Pt has now changed his mind and wants HHC.  Referrals placed.  Pt also needs a walker and this was approved and delivered to room  CM contacted family/caregiver?: Yes  Were Treatment Team discharge recommendations reviewed with patient/caregiver?: Yes  Did patient/caregiver verbalize understanding of patient care needs?: Yes  Were patient/caregiver advised of the risks associated with not following Treatment Team discharge recommendations?: Yes         Requested Home Health Care         Is the patient interested in HHC at discharge?: Yes  Home Health Discipline requested:: Nursing, Physical Therapy  Home Health Follow-Up Provider:: PCP  Home Health Services Needed:: Evaluate Functional Status and Safety, Gait/ADL Training, Strengthening/Theraputic Exercises to Improve  Function  Homebound Criteria Met:: Uses an Assist Device (i.e. cane, walker, etc), Requires the Assistance of Another Person for Safe Ambulation or to Leave the Home  Supporting Clincal Findings:: Fatigues Easliy in Short Distances, Limited Endurance    DME Referral Provided  Referral made for DME?: Yes  DME referral completed for the following items:: Walker  DME Supplier Name:: [employer]    Other Referral/Resources/Interventions Provided:  Interventions: DME, HHC  Referral Comments: HHC pended per pt request/Walker delivered to room    Would you like to participate in our [facility] service program?  : No - Declined    Treatment Team Recommendation: Home with Home Health Care  Discharge Destination Plan:: Home with Home Health Care  Transport at Discharge : Family

## 2025-02-09 NOTE — PLAN OF CARE
Problem: INFECTION - ADULT  Goal: Absence or prevention of progression during hospitalization  Description: INTERVENTIONS:  - Assess and monitor for signs and symptoms of infection  - Monitor lab/diagnostic results  - Monitor all insertion sites, i.e. indwelling lines, tubes, and drains  - Monitor endotracheal if appropriate and nasal secretions for changes in amount and color  - [place] appropriate cooling/warming therapies per order  - Administer medications as ordered  - Instruct and encourage patient and family to use good hand hygiene technique  - Identify and instruct in appropriate isolation precautions for identified infection/condition  Outcome: Progressing     Problem: PAIN - ADULT  Goal: Verbalizes/displays adequate comfort level or baseline comfort level  Description: Interventions:  - Encourage patient to monitor pain and request assistance  - Assess pain using appropriate pain scale  - Administer analgesics based on type and severity of pain and evaluate response  - Implement non-pharmacological measures as appropriate and evaluate response  - Consider cultural and social influences on pain and pain management  - Notify physician/advanced practitioner if interventions unsuccessful or patient reports new pain  Outcome: Progressing

## 2025-02-10 NOTE — OP NOTE
OPERATIVE REPORT  PATIENT NAME: Rivas Chauhan    :  1985  MRN: [id number]  Pt Location: [facility] 05    SURGERY DATE: 2025    Surgeons and Role:     * Prateek Champagne MD - Primary     * Gallo Swan PA-C - Assisting    Preop Diagnosis:  Closed fracture of left tibia and fibula, initial encounter [S82.202A, S82.402A]  Left tibia posterior malleolus fracture    Postop Diagnosis:  Same    Procedure(s):  IM nail fixation L tibia shaft fracture (CPT 76702)  ORIF L posterior malleolus fracture (CPT 62256)    Specimen(s):  * No specimens in log *    Estimated Blood Loss:   Minimal    Drains:  * No LDAs found *    Anesthesia Type:   General    Operative Indications:  Displaced tibial shaft, posterior malleolus fracture in ambulatory patient    Operative Findings:  See below    Complications:   None    Implants: Synthes 4.0 mm cannulated screw x1, 10x390 tibial nail, 5.0 mm locking bolts x5    Procedure and Technique:  The patient is a 1 pack/week smoker and is at elevated risk for complications including but not limited to infection, wound healing complications, malunion, nonunion, need for additional procedures, purulent fracture, loss of limb, DVT/PE, compartment syndrome.  The patient's operative site, laterality, procedure, consent were verified in the preoperative area and the patient was taken to the operating room.  General anesthesia was induced and the operative extremity was prepped and draped in the usual sterile fashion.  After timeout, wire was advanced from anterior to posterior and stab incision was made and soft tissue was spread and drilling took place and partially-threaded cannulated screw was placed to compress posterior malleolus fracture which was satisfactory on biplanar fluoroscopic imaging.    Incision was then made superior to the proximal pole of the patella and dissection was carried down to the level of the quad tendon which was incised.  Knee joint was entered and  imaging was introduced into the knee joint.  Starting wire was advanced in appropriate starting point trajectory.  Opening reamer was advanced a ball-tipped guidewire was advanced to the level of the fracture.  Fracture was unable to be reduced by closed means and 2 stab incisions were utilized and reduction clamp was utilized to reduce fracture.  Altered guidewire was then advanced to the level of the physeal scar.  Sequential reaming took place to the 11.5 mm diameter and tibial nail was then placed and seated into ideal position.  Proximally, utilizing 2 stab incisions, drilling took place for 2 proximal medial to lateral bolts.  Drilling took place distally utilizing stab incisions and perfect [employer] [place] technique for 3 distal locking bolts.  Reduction was found to be satisfactory after removal of reduction clamp.  All wounds were copiously irrigated with sterile saline.  Closure took place at quad tendon with heavy Vicryl suture, subcutaneous and skin layer closure to place with Vicryl sutures and nylon sutures.  Sterile dressings were applied.  The patient was placed in a well-padded posterior fiberglass splint.  All final counts were correct.  I was present for the entire procedure.  The patient was extubated and awakened and taken to the PACU in stable condition.  There were no immediate complications.    There was no qualified resident available for the procedure. Critical assistance from Gallo Swan PA-C was required for all components of the procedure including but not limited to positioning, soft tissue retraction, passage of instrumentation, reduction of fracture, soft tissue closure. This was particularly important given the complex nature of the fracture/fixation.    The patient will be nonweight bearing for 6 wks. DVT PPX in form of aspirin may continue for 6 wks. We will consider suture removal in 2 wks.      Patient Disposition:  PACU              SIGNATURE: Prateek Champagne MD  DATE: February 10,  2025  TIME: 12:18 PM

## 2025-02-15 LAB
DME PARACHUTE DELIVERY DATE ACTUAL: NORMAL
DME PARACHUTE DELIVERY DATE REQUESTED: NORMAL
DME PARACHUTE ITEM DESCRIPTION: NORMAL
DME PARACHUTE ORDER STATUS: NORMAL
DME PARACHUTE SUPPLIER NAME: NORMAL
DME PARACHUTE SUPPLIER PHONE: NORMAL

## 2025-02-17 DIAGNOSIS — S82.202A CLOSED FRACTURE OF LEFT TIBIA AND FIBULA, INITIAL ENCOUNTER: Primary | ICD-10-CM

## 2025-02-17 DIAGNOSIS — S82.402A CLOSED FRACTURE OF LEFT TIBIA AND FIBULA, INITIAL ENCOUNTER: Primary | ICD-10-CM

## 2025-02-19 ENCOUNTER — TELEPHONE (OUTPATIENT)
Age: 40
End: 2025-02-19

## 2025-02-20 ENCOUNTER — APPOINTMENT (OUTPATIENT)
Dept: RADIOLOGY | Facility: CLINIC | Age: 40
End: 2025-02-20
Payer: COMMERCIAL

## 2025-02-20 ENCOUNTER — OFFICE VISIT (OUTPATIENT)
Dept: OBGYN CLINIC | Facility: CLINIC | Age: 40
End: 2025-02-20

## 2025-02-20 VITALS — BODY MASS INDEX: 24.25 KG/M2 | HEIGHT: 75 IN | WEIGHT: 195 LBS

## 2025-02-20 DIAGNOSIS — S82.202A CLOSED FRACTURE OF LEFT TIBIA AND FIBULA, INITIAL ENCOUNTER: Primary | ICD-10-CM

## 2025-02-20 DIAGNOSIS — S82.202A CLOSED FRACTURE OF LEFT TIBIA AND FIBULA, INITIAL ENCOUNTER: ICD-10-CM

## 2025-02-20 DIAGNOSIS — S82.402A CLOSED FRACTURE OF LEFT TIBIA AND FIBULA, INITIAL ENCOUNTER: ICD-10-CM

## 2025-02-20 DIAGNOSIS — S82.402A CLOSED FRACTURE OF LEFT TIBIA AND FIBULA, INITIAL ENCOUNTER: Primary | ICD-10-CM

## 2025-02-20 PROCEDURE — 73590 X-RAY EXAM OF LOWER LEG: CPT

## 2025-02-20 PROCEDURE — 73610 X-RAY EXAM OF ANKLE: CPT

## 2025-02-20 PROCEDURE — 99024 POSTOP FOLLOW-UP VISIT: CPT | Performed by: ORTHOPAEDIC SURGERY

## 2025-02-20 NOTE — PATIENT INSTRUCTIONS
Nonweightbearing left lower extremity  Sutures removed in the office.  Patient tolerated well.   Okay To begin showering.  Allow water to flow over the incision sites.  Do not vigorously scrubbed or soak wounds until otherwise stated   Continue to take Aspirin 325mg  daily until otherwise stated   Begin physical therapy as directed   Maintain steri-strips for 3-5 days in duration. If steri-strips remain intact for 7 days, peel off carefully   Podous boot provided for patient. Maintain as directed   Over the counter analgesics as needed / directed   Ice / heat as directed   Please discontinue tobacco use until otherwise stated. Failure to do so may result in wound healing complications, suboptimal surgical results, possible limb impairment, loss of limb.   Follow up 4 weeks with repeat XR

## 2025-02-20 NOTE — LETTER
February 20, 2025     Patient: Rivas Chauhan  YOB: 1985  Date of Visit: 2/20/2025      To Whom it May Concern:    Rivas Chahuan is under my professional care. Rivas was seen in my office on 2/20/2025. Rivas may not return to work until otherwise stated.      If you have any questions or concerns, please don't hesitate to call.             Sincerely,       Prateek Champagne MD

## 2025-02-20 NOTE — ASSESSMENT & PLAN NOTE
13 days s/p IM nail fixation left tibial shaft fracture,  posterior Malleolus ORIF    DOS 2/7/25  WC  + Tobacco use     Nonweightbearing left lower extremity  Sutures removed in the office.  Patient tolerated well.   Okay To begin showering.  Allow water to flow over the incision sites.  Do not vigorously scrubbed or soak wounds until otherwise stated   Continue to take Aspirin 325mg  daily until otherwise stated   Begin physical therapy as directed   Maintain steri-strips for 3-5 days in duration. If steri-strips remain intact for 7 days, peel off carefully   Podous boot provided for patient. Maintain as directed   Over the counter analgesics as needed / directed   Ice / heat as directed   Please discontinue tobacco use until otherwise stated. Failure to do so may result in wound healing complications, suboptimal surgical results, possible limb impairment, loss of limb.   Follow up 4 weeks with repeat XR       Orders:    Suture removal    Ambulatory Referral to Physical Therapy; Future    Podous Boot

## 2025-02-20 NOTE — PROGRESS NOTES
Name: Rivas Chauhan      : 1985       MRN: [id number]   Encounter Provider: Prateek Champagne MD   Encounter Date: 25  Encounter department: [facility] [place]     Assessment & Plan  Closed fracture of left tibia and fibula, initial encounter  13 days s/p IM nail fixation left tibial shaft fracture,  posterior Malleolus ORIF    DOS 25  WC  + Tobacco use     Nonweightbearing left lower extremity  Sutures removed in the office.  Patient tolerated well.   Okay To begin showering.  Allow water to flow over the incision sites.  Do not vigorously scrubbed or soak wounds until otherwise stated   Continue to take Aspirin 325mg  daily until otherwise stated   Begin physical therapy as directed   Maintain steri-strips for 3-5 days in duration. If steri-strips remain intact for 7 days, peel off carefully   Podous boot provided for patient. Maintain as directed   Over the counter analgesics as needed / directed   Ice / heat as directed   Please discontinue tobacco use until otherwise stated. Failure to do so may result in wound healing complications, suboptimal surgical results, possible limb impairment, loss of limb.   Follow up 4 weeks with repeat XR       Orders:    Suture removal    Ambulatory Referral to Physical Therapy; Future    Podous Boot       To Do Next Visit:  Left tibia / fibula XR     _____________________________________________________  CHIEF COMPLAINT:  Chief Complaint   Patient presents with    Left Leg - Follow-up, Post-op     Surgery 25. Updated xrays today. Intermittent pain and discomfort since the surgery. He finished the Oxycodone, and is now taking Robaxin PRN.          SUBJECTIVE:  Rivas Chauhan is a 39 y.o. male who presents  13 days s/p IM nail fixation left tibial shaft fracture   DOS 25.  Patient states that his leg is doing well overall.  Patient states he has minimal pain.  Patient denies any numbness or tingling in the leg  currently.  Patient has been compliant with nonweightbearing restrictions of the left lower extremity.  Patient has been compliant with anticoagulation.  Patient offers no other complaints at this time.      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    Anxiety     Bipolar disorder (HCC)     Depression     Disease of thyroid gland     Drug abuse (HCC)     ETOH abuse     Hallucination     Hyperlipidemia     Psychiatric illness     Psychosis (HCC)     Schizoaffective disorder, bipolar type (HCC) 06/30/2018    Schizophrenia (HCC)        PAST SURGICAL HISTORY:  Past Surgical History:   Procedure Laterality Date    APPENDECTOMY LAPAROSCOPIC N/A 6/29/2022    Procedure: APPENDECTOMY LAPAROSCOPIC, possible open;  Surgeon: Declan Hill DO;  Location: [facility];  Service: General    RI TX TIBL SHFT FX IMED IMPLT W/WO SCREWS&/CERCLA Left 2/7/2025    Procedure: INSERTION NAIL IM LEFT TIBIA, ORIF OF POSTERIOR MALEOLUS;  Surgeon: Prateek Champagne MD;  Location: [facility];  Service: Orthopedics       FAMILY HISTORY:  History reviewed. No pertinent family history.    SOCIAL HISTORY:  Social History     Tobacco Use    Smoking status: Every Day     Current packs/day: 0.50     Average packs/day: 0.5 packs/day for 25.0 years (12.5 ttl pk-yrs)     Types: Cigarettes    Smokeless tobacco: Current   Vaping Use    Vaping status: Former   Substance Use Topics    Alcohol use: Not Currently     Alcohol/week: 15.0 standard drinks of alcohol     Types: 15 Shots of liquor per week     Comment: 5 to 6 shots in a sitting    Drug use: Not Currently     Types: Marijuana, Methamphetamines     Comment: smoked at 13 last night last used 5 months ago (snort or smoked)       MEDICATIONS:    Current Outpatient Medications:     acetaminophen (TYLENOL) 500 mg tablet, Take 2 tablets (1,000 mg total) by mouth every 8 (eight) hours, Disp: 60 tablet, Rfl: 2    albuterol (ProAir HFA) 90 mcg/act  "inhaler, Inhale 2 puffs every 6 (six) hours as needed for wheezing, Disp: 8.5 g, Rfl: 0    aspirin 325 mg tablet, Take 1 tablet (325 mg total) by mouth daily Take with food, Disp: 45 tablet, Rfl: 0    benztropine (COGENTIN) 1 mg tablet, Take 1 tablet (1 mg total) by mouth 2 (two) times a day, Disp: 60 tablet, Rfl: 0    docusate sodium (COLACE) 100 mg capsule, Take 1 capsule (100 mg total) by mouth 2 (two) times a day for 10 days Take for as long as taking opioids., Disp: 20 capsule, Rfl: 0    levothyroxine 175 mcg tablet, take 1 tablet by mouth once daily, Disp: 30 tablet, Rfl: 1    loxapine (LOXITANE) 10 mg capsule, 10 mg 2 (two) times a day, Disp: , Rfl:     methocarbamol (ROBAXIN) 500 mg tablet, Take 1 tablet (500 mg total) by mouth 4 (four) times a day, Disp: 20 tablet, Rfl: 0    OXcarbazepine (TRILEPTAL) 600 mg tablet, Take 600 mg by mouth every 12 (twelve) hours  , Disp: , Rfl:     risperiDONE (RisperDAL) 4 mg tablet, Take 4 mg by mouth 2 (two) times a day, Disp: , Rfl:     oxyCODONE (Roxicodone) 5 immediate release tablet, Take 1 tablet (5 mg total) by mouth every 6 (six) hours as needed for severe pain for up to 20 doses Max Daily Amount: 20 mg (Patient not taking: Reported on 2/20/2025), Disp: 8 tablet, Rfl: 0    oxyCODONE (Roxicodone) 5 immediate release tablet, Take 1 tablet (5 mg total) by mouth every 4 (four) hours as needed for moderate pain for up to 30 doses Max Daily Amount: 30 mg (Patient not taking: Reported on 2/20/2025), Disp: 30 tablet, Rfl: 0    ALLERGIES:  No Known Allergies    LABS:  HgA1c:   Lab Results   Component Value Date    HGBA1C 5.9 (H) 03/05/2024     BMP:   Lab Results   Component Value Date    CALCIUM 8.5 02/08/2025     06/22/2018    K 3.7 02/08/2025    CO2 27 02/08/2025     02/08/2025    BUN 11 02/08/2025    CREATININE 0.81 02/08/2025     CBC: No components found for: \"CBC\"    _____________________________________________________  PHYSICAL EXAMINATION:  Vital signs: Ht " "6' 3\" (1.905 m)   Wt 88.5 kg (195 lb)   BMI 24.37 kg/m²   General: No acute distress, awake and alert  Psychiatric: Mood and affect appear appropriate  HEENT: Trachea Midline, No torticollis, no apparent facial trauma  Cardiovascular: No audible murmurs; Extremities appear perfused  Pulmonary: No audible wheezing or stridor  Skin: No open lesions; see further details (if any) below      MUSCULOSKELETAL EXAMINATION:  Left lower extremity examination :  Patient sitting comfortably in the office in no apparent distress   Healed incision site noted throughout the left lower extremity.  Resolving ecchymosis noted over the distal aspect of the lower extremity.  Sutures intact  Mild tenderness palpation noted over the midshaft tibia, quadriceps tendon.  No other bony or soft tissue tenderness to palpation noted at this time.  Patient able to actively flex knee to 90 degrees and is able to extend the knee to 5 degrees off neutral.  Limited range of motion of the ankle in all planes of motion secondary to pain  NV intact    _____________________________________________________  STUDIES REVIEWED:  I personally reviewed the images obtained in office today and my independent interpretation is as follows:  Left tibia / fibula XR 2 views :  Healing tibial shaft fracture with retained hardware present.        PROCEDURES PERFORMED:  Suture removal    Date/Time: 2/20/2025 8:15 AM    Performed by: Prateek Champagne MD  Authorized by: Prateek Champagne MD  Universal Protocol:  procedure performed by consultantConsent: Verbal consent obtained.  Risks and benefits: risks, benefits and alternatives were discussed  Consent given by: patient  Patient understanding: patient states understanding of the procedure being performed  Site marked: the operative site was marked  Patient identity confirmed: verbally with patient      Patient location:  Bedside  Location:     Laterality:  Left    Location:  Lower extremity    Lower extremity " location:  Leg    Leg location:  L lower leg  Procedure details:     Tools used:  Suture removal kit    Wound appearance:  No sign(s) of infection, clean and good wound healing  Post-procedure details:     Post-removal:  Steri-Strips applied    Patient tolerance of procedure:  Tolerated well, no immediate complications                Pierre Vivar PA-C - assisting  Prateek Champagne MD

## 2025-03-05 ENCOUNTER — TELEPHONE (OUTPATIENT)
Age: 40
End: 2025-03-05

## 2025-03-05 NOTE — TELEPHONE ENCOUNTER
Caller: Yoon from [facility] PT     Doctor: Dr. Champagne    Reason for call: please fax PT script to fax # [phone]    Call back#: [phone]    Thank you.

## 2025-03-13 DIAGNOSIS — E03.9 HYPOTHYROIDISM, UNSPECIFIED TYPE: ICD-10-CM

## 2025-03-14 RX ORDER — LEVOTHYROXINE SODIUM 175 UG/1
175 TABLET ORAL DAILY
Qty: 30 TABLET | Refills: 1 | Status: SHIPPED | OUTPATIENT
Start: 2025-03-14

## 2025-03-18 ENCOUNTER — OFFICE VISIT (OUTPATIENT)
Dept: OBGYN CLINIC | Facility: CLINIC | Age: 40
End: 2025-03-18

## 2025-03-18 ENCOUNTER — TELEPHONE (OUTPATIENT)
Age: 40
End: 2025-03-18

## 2025-03-18 ENCOUNTER — APPOINTMENT (OUTPATIENT)
Dept: RADIOLOGY | Facility: CLINIC | Age: 40
End: 2025-03-18
Payer: COMMERCIAL

## 2025-03-18 VITALS — WEIGHT: 195.2 LBS | HEIGHT: 75 IN | BODY MASS INDEX: 24.27 KG/M2

## 2025-03-18 DIAGNOSIS — S82.402A CLOSED FRACTURE OF LEFT TIBIA AND FIBULA, INITIAL ENCOUNTER: Primary | ICD-10-CM

## 2025-03-18 DIAGNOSIS — S82.202A CLOSED FRACTURE OF LEFT TIBIA AND FIBULA, INITIAL ENCOUNTER: Primary | ICD-10-CM

## 2025-03-18 PROCEDURE — 99024 POSTOP FOLLOW-UP VISIT: CPT | Performed by: PHYSICIAN ASSISTANT

## 2025-03-18 PROCEDURE — 73590 X-RAY EXAM OF LOWER LEG: CPT

## 2025-03-18 NOTE — LETTER
March 18, 2025     Patient: Rivas Chauhan  YOB: 1985  Date of Visit: 3/18/2025      To Whom it May Concern:    Rivas Chauhan is under my professional care. Rivas was seen in my office on 3/18/2025. Rivas may now return to work and is otherwise stated.    If you have any questions or concerns, please don't hesitate to call.             Sincerely,        Pierre Vivar PA-C        CC: No Recipients

## 2025-03-18 NOTE — ASSESSMENT & PLAN NOTE
5.5 weeks status post IM nail fixation right tibial shaft fracture, ORIF posterior malleolus   DOS 2/7/25  Minimal healing on XR   Swelling and discoloration of left lower extremity with calf pain  + Tobacco use  Begin weight bearing as tolerated left lower extremity in cam walker in 1 week  Patient sent for stat venous ultrasound of the left lower extremity to evaluate for acute DVT  Continue physical therapy as directed  Please discontinue tobacco use until otherwise stated. Failure to do so may result in wound healing complications, suboptimal surgical results, possible limb impairment, loss of limb.   Over the counter analgesics as needed / directed   Ice / heat as directed   Follow-up 4 weeks for repeat x-ray      Orders:    XR tibia fibula 2 vw left    VAS VENOUS DUPLEX -LOWER LIMB UNILATERAL; Future    Cam Boot

## 2025-03-18 NOTE — PATIENT INSTRUCTIONS
Begin weight bearing as tolerated left lower extremity in cam walker in 1 week  Patient sent for stat venous ultrasound of the left lower extremity to evaluate for acute DVT  Continue physical therapy as directed  Please discontinue tobacco use until otherwise stated. Failure to do so may result in wound healing complications, suboptimal surgical results, possible limb impairment, loss of limb.   Over the counter analgesics as needed / directed   Ice / heat as directed   Follow-up 4 weeks for repeat x-ray

## 2025-03-18 NOTE — PROGRESS NOTES
Name: Rivas Chauhan      : 1985       MRN: [id number]   Encounter Provider: Pierre Vivar PA-C   Encounter Date: 25  Encounter department: [facility] [place]         Assessment & Plan  Closed fracture of left tibia and fibula, initial encounter  5.5 weeks status post IM nail fixation right tibial shaft fracture, ORIF posterior malleolus   DOS 25  Minimal healing on XR   Swelling and discoloration of left lower extremity with calf pain  + Tobacco use  Begin weight bearing as tolerated left lower extremity in cam walker in 1 week  Patient sent for stat venous ultrasound of the left lower extremity to evaluate for acute DVT  Continue physical therapy as directed  Please discontinue tobacco use until otherwise stated. Failure to do so may result in wound healing complications, suboptimal surgical results, possible limb impairment, loss of limb.   Over the counter analgesics as needed / directed   Ice / heat as directed   Follow-up 4 weeks for repeat x-ray      Orders:    XR tibia fibula 2 vw left    VAS VENOUS DUPLEX -LOWER LIMB UNILATERAL; Future    Cam Boot      To Do Next Visit:  X-ray left tibia / fibula     _____________________________________________________  CHIEF COMPLAINT:  Chief Complaint   Patient presents with    Left Lower Leg - Follow-up, Post-op     Surgery 25. Updated xrays today. Patient states the discomfort has been kept to a minimum, and only c/o pain at times with bending his knee. He's currently doing PT which he states has been going well.          SUBJECTIVE:  Rivas Chauhan is a 39 y.o. male who presents  5.5 weeks status post IM nail fixation right tibial shaft fracture, ORIF posterior malleolus   DOS 25.  Patient states that he is doing well overall.  Patient states that he has no pain in the leg currently.  Patient states that he has been compliant with nonweightbearing restrictions of the left lower extremity.   Patient has continued to use tobacco products throughout the postoperative period.  Patient denies any numbness or tingling in the leg.  Patient has been compliant with anticoagulation.  Patient offers no other complaints at this time.    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    Anxiety     Bipolar disorder (HCC)     Depression     Disease of thyroid gland     Drug abuse (HCC)     ETOH abuse     Hallucination     Hyperlipidemia     Psychiatric illness     Psychosis (HCC)     Schizoaffective disorder, bipolar type (HCC) 06/30/2018    Schizophrenia (HCC)        PAST SURGICAL HISTORY:  Past Surgical History:   Procedure Laterality Date    APPENDECTOMY LAPAROSCOPIC N/A 6/29/2022    Procedure: APPENDECTOMY LAPAROSCOPIC, possible open;  Surgeon: Declan Hill DO;  Location: [facility];  Service: General    [place] TX TIBL SHFT FX IMED IMPLT W/WO SCREWS&/CERCLA Left 2/7/2025    Procedure: INSERTION NAIL IM LEFT TIBIA, ORIF OF POSTERIOR MALEOLUS;  Surgeon: Prateek Champagne MD;  Location: [facility];  Service: Orthopedics       FAMILY HISTORY:  History reviewed. No pertinent family history.    SOCIAL HISTORY:  Social History     Tobacco Use    Smoking status: Every Day     Current packs/day: 0.50     Average packs/day: 0.5 packs/day for 25.0 years (12.5 ttl pk-yrs)     Types: Cigarettes    Smokeless tobacco: Current   Vaping Use    Vaping status: Former   Substance Use Topics    Alcohol use: Not Currently     Alcohol/week: 15.0 standard drinks of alcohol     Types: 15 Shots of liquor per week     Comment: 5 to 6 shots in a sitting    Drug use: Not Currently     Types: Marijuana, Methamphetamines     Comment: smoked at 12 last night last used 5 months ago (snort or smoked)       MEDICATIONS:    Current Outpatient Medications:     albuterol (ProAir HFA) 90 mcg/act inhaler, Inhale 2 puffs every 6 (six) hours as needed for wheezing, Disp: 8.5 g, Rfl: 0    aspirin 325 mg  "tablet, Take 1 tablet (325 mg total) by mouth daily Take with food, Disp: 45 tablet, Rfl: 0    benztropine (COGENTIN) 1 mg tablet, Take 1 tablet (1 mg total) by mouth 2 (two) times a day, Disp: 60 tablet, Rfl: 0    docusate sodium (COLACE) 100 mg capsule, Take 1 capsule (100 mg total) by mouth 2 (two) times a day for 10 days Take for as long as taking opioids., Disp: 20 capsule, Rfl: 0    levothyroxine 175 mcg tablet, take 1 tablet by mouth once daily, Disp: 30 tablet, Rfl: 1    loxapine (LOXITANE) 10 mg capsule, 10 mg 2 (two) times a day, Disp: , Rfl:     methocarbamol (ROBAXIN) 500 mg tablet, Take 1 tablet (500 mg total) by mouth 4 (four) times a day, Disp: 20 tablet, Rfl: 0    OXcarbazepine (TRILEPTAL) 600 mg tablet, Take 600 mg by mouth every 12 (twelve) hours  , Disp: , Rfl:     risperiDONE (RisperDAL) 4 mg tablet, Take 4 mg by mouth 2 (two) times a day, Disp: , Rfl:     ALLERGIES:  No Known Allergies    LABS:  HgA1c:   Lab Results   Component Value Date    HGBA1C 5.9 (H) 03/05/2024     BMP:   Lab Results   Component Value Date    CALCIUM 8.5 02/08/2025     06/22/2018    K 3.7 02/08/2025    CO2 27 02/08/2025     02/08/2025    BUN 11 02/08/2025    CREATININE 0.81 02/08/2025     CBC: No components found for: \"CBC\"    _____________________________________________________  PHYSICAL EXAMINATION:  Vital signs: Ht 6' 3\" (1.905 m)   Wt 88.5 kg (195 lb 3.2 oz)   BMI 24.40 kg/m²   General: No acute distress, awake and alert  Psychiatric: Mood and affect appear appropriate  HEENT: Trachea Midline, No torticollis, no apparent facial trauma  Cardiovascular: No audible murmurs; Extremities appear perfused  Pulmonary: No audible wheezing or stridor  Skin: No open lesions; see further details (if any) below      MUSCULOSKELETAL EXAMINATION:  Left lower extremity examination:  Patient sitting comfortably in the office in no apparent distress   Healed incision sites noted throughout the left lower extremity.  " Discoloration present from mid lower extremity down into the foot with minimal edema present.  Tenderness palpation noted over the mid calf.  No other bony or soft tissue tenderness to palpation noted at this time.  0 to 110 degrees of range of motion of the knee appreciated with mild pain.  Full range of motion of the ankle present with no pain  NV intact    _____________________________________________________  STUDIES REVIEWED:  I personally reviewed the images obtained in office today and my independent interpretation is as follows:  Left tibia / fibula XR 2 views :  Healing distal tibia fracture with minimal callus summation noted.  Hardware intact        PROCEDURES PERFORMED:  No procedures were performed at this time.                   Pierre Vivar PA-C   Scribe Attestation      I,:   am acting as a scribe while in the presence of the attending physician.:       I,:   personally performed the services described in this documentation    as scribed in my presence.:

## 2025-03-18 NOTE — TELEPHONE ENCOUNTER
Caller: Sudha Bello from [place]     Doctor: Edwar Vivar    Reason for call: Work note currently says Rivas may now return to work and is otherwise stated.     Can this please be corrected to MAY NOT and please clarify what is meant by otherwise stated.     Fax # [phone]    Call back#: [phone]      Thank you.

## 2025-03-19 ENCOUNTER — HOSPITAL ENCOUNTER (OUTPATIENT)
Dept: VASCULAR ULTRASOUND | Facility: HOSPITAL | Age: 40
Discharge: HOME | End: 2025-03-19
Payer: OTHER MISCELLANEOUS

## 2025-03-19 DIAGNOSIS — S82.402A CLOSED FRACTURE OF LEFT TIBIA AND FIBULA, INITIAL ENCOUNTER: ICD-10-CM

## 2025-03-19 DIAGNOSIS — S82.202A CLOSED FRACTURE OF LEFT TIBIA AND FIBULA, INITIAL ENCOUNTER: ICD-10-CM

## 2025-03-19 PROCEDURE — 93971 EXTREMITY STUDY: CPT

## 2025-03-19 PROCEDURE — 93971 EXTREMITY STUDY: CPT | Performed by: SURGERY

## 2025-04-10 DIAGNOSIS — S82.202A CLOSED FRACTURE OF LEFT TIBIA AND FIBULA, INITIAL ENCOUNTER: Primary | ICD-10-CM

## 2025-04-10 DIAGNOSIS — S82.402A CLOSED FRACTURE OF LEFT TIBIA AND FIBULA, INITIAL ENCOUNTER: Primary | ICD-10-CM

## 2025-04-15 ENCOUNTER — OFFICE VISIT (OUTPATIENT)
Dept: OBGYN CLINIC | Facility: CLINIC | Age: 40
End: 2025-04-15

## 2025-04-15 ENCOUNTER — APPOINTMENT (OUTPATIENT)
Dept: RADIOLOGY | Facility: CLINIC | Age: 40
End: 2025-04-15
Attending: ORTHOPAEDIC SURGERY
Payer: COMMERCIAL

## 2025-04-15 VITALS — BODY MASS INDEX: 23.6 KG/M2 | WEIGHT: 189.8 LBS | HEIGHT: 75 IN

## 2025-04-15 DIAGNOSIS — S82.202K CLOSED FRACTURE OF LEFT FIBULA AND TIBIA WITH NONUNION, SUBSEQUENT ENCOUNTER: Primary | ICD-10-CM

## 2025-04-15 DIAGNOSIS — Z98.890 HISTORY OF OPEN REDUCTION AND INTERNAL FIXATION (ORIF) PROCEDURE: ICD-10-CM

## 2025-04-15 DIAGNOSIS — S82.202A CLOSED FRACTURE OF LEFT TIBIA AND FIBULA, INITIAL ENCOUNTER: ICD-10-CM

## 2025-04-15 DIAGNOSIS — S82.402K CLOSED FRACTURE OF LEFT FIBULA AND TIBIA WITH NONUNION, SUBSEQUENT ENCOUNTER: Primary | ICD-10-CM

## 2025-04-15 DIAGNOSIS — S82.402A CLOSED FRACTURE OF LEFT TIBIA AND FIBULA, INITIAL ENCOUNTER: ICD-10-CM

## 2025-04-15 PROCEDURE — 73610 X-RAY EXAM OF ANKLE: CPT

## 2025-04-15 PROCEDURE — 99024 POSTOP FOLLOW-UP VISIT: CPT | Performed by: ORTHOPAEDIC SURGERY

## 2025-04-15 PROCEDURE — 73590 X-RAY EXAM OF LOWER LEG: CPT

## 2025-04-15 NOTE — ASSESSMENT & PLAN NOTE
Metabolic bone health labs ordered  Pt with history of thyroid dysfunction, last labs on file not wnl    9.5 weeks status post IM nail fixation right tibial shaft fracture, ORIF posterior malleolus   DOS 2/7/25; workman's compensation case  Increased healing on XR   + Tobacco use - continues to smoke  Discussed today's x-rays demonstrating subtle increase of callus formation to 2/3 fracture locations.   Bone stimulator is ordered at today's office visit  Cleared to transition out of cam boot and discontinue use of walker   Provided new Pt script stating aggressive strengthening and work hardening exercises are needed.    Ordered Bone Healing labs.   Provided work note stating he is cleared for seated duty, no lifting more than 5 pounds  Follow up 4 weeks.   To Do Next Visit:  X-ray left tibia / fibula     Orders:    Ambulatory Referral to Physical Therapy; Future    Vitamin D 25 hydroxy; Future    Osteogenesic Stimulator    TSH, 3rd generation with Free T4 reflex; Future    PTH, Intact and Calcium; Future    Testosterone, free, total; Future    Alkaline phosphatase, bone specific; Future

## 2025-04-15 NOTE — PROGRESS NOTES
Name: Rivas Chauhan      : 1985       MRN: [id number]   Encounter Provider: Prateek Champagne MD   Encounter Date: 04/15/25  Encounter department: [facility] [place]         Assessment & Plan  Closed fracture of left tibia and fibula, initial encounter  Metabolic bone health labs ordered  Pt with history of thyroid dysfunction, last labs on file not wnl    9.5 weeks status post IM nail fixation right tibial shaft fracture, ORIF posterior malleolus   DOS 25; workman's compensation case  Increased healing on XR   + Tobacco use - continues to smoke  Discussed today's x-rays demonstrating subtle increase of callus formation to 2/3 fracture locations.   Bone stimulator is ordered at today's office visit  Cleared to transition out of cam boot and discontinue use of walker   Provided new Pt script stating aggressive strengthening and work hardening exercises are needed.    Ordered Bone Healing labs.   Provided work note stating he is cleared for seated duty, no lifting more than 5 pounds  Follow up 4 weeks.   To Do Next Visit:  X-ray left tibia / fibula     Orders:    Ambulatory Referral to Physical Therapy; Future    Vitamin D 25 hydroxy; Future    Osteogenesic Stimulator    TSH, 3rd generation with Free T4 reflex; Future    PTH, Intact and Calcium; Future    Testosterone, free, total; Future    Alkaline phosphatase, bone specific; Future    History of open reduction and internal fixation (ORIF) procedure    Orders:    Ambulatory Referral to Physical Therapy; Future      _____________________________________________________  CHIEF COMPLAINT:  Chief Complaint   Patient presents with    Left Leg - Follow-up, Post-op     Surgery 25. Updated Ankle & TibFib XR today. He states there is intermittent pain and discomfort, primarily when walking, but it is improving. He's doing outpatient PT which is going well.       SUBJECTIVE:  Rivas Chauhan is a 39 y.o. male who  presents  9.5 weeks months status post IM nail fixation right tibial shaft fracture, ORIF posterior malleolus   DOS 2/7/25.  Patient states that he is doing well overall.  He has mild complaints of lateral ankle discomfort when weightbearing in cam boot. He is compliant with going to formal PT twice a week with home exercises. He presents today using a walker for assistance in ambulating. Denies numbness or tingling to his toes. Patient has continued to use tobacco products throughout the postoperative period, admits to half a pack a day. Patient offers no other complaints at this time. He has not returned to work at this point of time.     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    Anxiety     Bipolar disorder (formerly [facility])     Depression     Disease of thyroid gland     Drug abuse (formerly [facility])     ETOH abuse     Hallucination     Hyperlipidemia     Psychiatric illness     Psychosis (formerly [facility])     Schizoaffective disorder, bipolar type (formerly [facility]) 06/30/2018    Schizophrenia (formerly [facility])        PAST SURGICAL HISTORY:  Past Surgical History:   Procedure Laterality Date    APPENDECTOMY LAPAROSCOPIC N/A 6/29/2022    Procedure: APPENDECTOMY LAPAROSCOPIC, possible open;  Surgeon: Declan Hill DO;  Location: [facility];  Service: General    [place] TX TIBL SHFT FX IMED IMPLT W/WO SCREWS&/CERCLA Left 2/7/2025    Procedure: INSERTION NAIL IM LEFT TIBIA, ORIF OF POSTERIOR MALEOLUS;  Surgeon: Prateek Champagne MD;  Location: [facility];  Service: Orthopedics       FAMILY HISTORY:  History reviewed. No pertinent family history.    SOCIAL HISTORY:  Social History     Tobacco Use    Smoking status: Every Day     Current packs/day: 0.50     Average packs/day: 0.5 packs/day for 25.0 years (12.5 ttl pk-yrs)     Types: Cigarettes    Smokeless tobacco: Current   Vaping Use    Vaping status: Former   Substance Use Topics    Alcohol use: Not Currently     Alcohol/week: 15.0 standard drinks of alcohol      "Types: 15 Shots of liquor per week     Comment: 5 to 6 shots in a sitting    Drug use: Not Currently     Types: Marijuana, Methamphetamines     Comment: smoked at 12 last night last used 5 months ago (snort or smoked)       MEDICATIONS:    Current Outpatient Medications:     albuterol (ProAir HFA) 90 mcg/act inhaler, Inhale 2 puffs every 6 (six) hours as needed for wheezing, Disp: 8.5 g, Rfl: 0    aspirin 325 mg tablet, Take 1 tablet (325 mg total) by mouth daily Take with food, Disp: 45 tablet, Rfl: 0    benztropine (COGENTIN) 1 mg tablet, Take 1 tablet (1 mg total) by mouth 2 (two) times a day, Disp: 60 tablet, Rfl: 0    docusate sodium (COLACE) 100 mg capsule, Take 1 capsule (100 mg total) by mouth 2 (two) times a day for 10 days Take for as long as taking opioids., Disp: 20 capsule, Rfl: 0    levothyroxine 175 mcg tablet, take 1 tablet by mouth once daily, Disp: 30 tablet, Rfl: 1    loxapine (LOXITANE) 10 mg capsule, 10 mg 2 (two) times a day, Disp: , Rfl:     methocarbamol (ROBAXIN) 500 mg tablet, Take 1 tablet (500 mg total) by mouth 4 (four) times a day, Disp: 20 tablet, Rfl: 0    OXcarbazepine (TRILEPTAL) 600 mg tablet, Take 600 mg by mouth every 12 (twelve) hours  , Disp: , Rfl:     risperiDONE (RisperDAL) 4 mg tablet, Take 4 mg by mouth 2 (two) times a day, Disp: , Rfl:     ALLERGIES:  No Known Allergies    LABS:  HgA1c:   Lab Results   Component Value Date    HGBA1C 5.9 (H) 03/05/2024     BMP:   Lab Results   Component Value Date    CALCIUM 8.5 02/08/2025     06/22/2018    K 3.7 02/08/2025    CO2 27 02/08/2025     02/08/2025    BUN 11 02/08/2025    CREATININE 0.81 02/08/2025     CBC: No components found for: \"CBC\"    _____________________________________________________  PHYSICAL EXAMINATION:  Vital signs: Ht 6' 3\" (1.905 m)   Wt 86.1 kg (189 lb 12.8 oz)   BMI 23.72 kg/m²   General: No acute distress, awake and alert  Psychiatric: Mood and affect appear appropriate  HEENT: Trachea Midline, " No torticollis, no apparent facial trauma  Cardiovascular: No audible murmurs; Extremities appear perfused  Pulmonary: No audible wheezing or stridor  Skin: No open lesions; see further details (if any) below      MUSCULOSKELETAL EXAMINATION:  Left lower extremity examination:  Patient sitting comfortably in the office in no apparent distress   Healed incision sites noted throughout the left lower extremity.  Discoloration present from mid lower extremity down into the foot with minimal edema present.  Mild tenderness to distal lateral tibia at fracture site  0 to 110 degrees of range of motion of the knee appreciated with mild pain.  Full range of motion of the ankle present with no pain  NV intact    _____________________________________________________  STUDIES REVIEWED:  I personally reviewed the images obtained in office today and my independent interpretation is as follows:  Left tibia / fibula XR 2 views :  Healing distal tibia fracture with subtle increase callus formation noted.  Hardware intact      PROCEDURES PERFORMED:  No procedures were performed at this time.     Scribe Attestation      I,:  Adelaide Casiano am acting as a scribe while in the presence of the attending physician.:       I,:  Prateek Champagne MD personally performed the services described in this documentation    as scribed in my presence.:

## 2025-04-15 NOTE — LETTER
April 15, 2025     Patient: Rivas Chauhan  YOB: 1985  Date of Visit: 4/15/2025      To Whom it May Concern:    Rivas Chauhan is under my professional care. Rivas was seen in my office on 4/15/2025. Rivas may return to work with limitations 4/16/2025 . Cleared for seated duty only with limitations of lifting, pushing, pulling, no more than 5 pounds.      If you have any questions or concerns, please don't hesitate to call.         Sincerely,          Prateek Champagne MD        CC: No Recipients

## 2025-04-21 PROBLEM — S82.202K: Status: ACTIVE | Noted: 2025-04-21

## 2025-04-21 PROBLEM — S82.402K: Status: ACTIVE | Noted: 2025-04-21

## 2025-05-02 ENCOUNTER — TELEPHONE (OUTPATIENT)
Age: 40
End: 2025-05-02

## 2025-05-02 NOTE — TELEPHONE ENCOUNTER
Caller: Day-Work comp CM    Doctor: Dr. Champagne    Reason for call: Calling to check status of bone stimulator. They were told someone would be reaching out to patient about this but has not heard anything. Can someone  look into this and call day back     Call back#: [phone]

## 2025-05-03 ENCOUNTER — APPOINTMENT (OUTPATIENT)
Dept: LAB | Facility: CLINIC | Age: 40
End: 2025-05-03
Payer: COMMERCIAL

## 2025-05-03 DIAGNOSIS — R73.01 IMPAIRED FASTING GLUCOSE: ICD-10-CM

## 2025-05-03 DIAGNOSIS — E03.9 HYPOTHYROIDISM, UNSPECIFIED TYPE: ICD-10-CM

## 2025-05-03 DIAGNOSIS — Z13.220 NEED FOR LIPID SCREENING: ICD-10-CM

## 2025-05-03 LAB
ALBUMIN SERPL BCG-MCNC: 4.4 G/DL (ref 3.5–5)
ALP SERPL-CCNC: 115 U/L (ref 34–104)
ALT SERPL W P-5'-P-CCNC: 15 U/L (ref 7–52)
ANION GAP SERPL CALCULATED.3IONS-SCNC: 9 MMOL/L (ref 4–13)
AST SERPL W P-5'-P-CCNC: 18 U/L (ref 13–39)
BILIRUB SERPL-MCNC: 0.38 MG/DL (ref 0.2–1)
BUN SERPL-MCNC: 13 MG/DL (ref 5–25)
CALCIUM SERPL-MCNC: 9.8 MG/DL (ref 8.4–10.2)
CHLORIDE SERPL-SCNC: 100 MMOL/L (ref 96–108)
CHOLEST SERPL-MCNC: 181 MG/DL (ref ?–200)
CO2 SERPL-SCNC: 30 MMOL/L (ref 21–32)
CREAT SERPL-MCNC: 1.03 MG/DL (ref 0.6–1.3)
EST. AVERAGE GLUCOSE BLD GHB EST-MCNC: 111 MG/DL
GFR SERPL CREATININE-BSD FRML MDRD: 91 ML/MIN/1.73SQ M
GLUCOSE P FAST SERPL-MCNC: 86 MG/DL (ref 65–99)
HBA1C MFR BLD: 5.5 %
HDLC SERPL-MCNC: 49 MG/DL
LDLC SERPL CALC-MCNC: 91 MG/DL (ref 0–100)
NONHDLC SERPL-MCNC: 132 MG/DL
POTASSIUM SERPL-SCNC: 4 MMOL/L (ref 3.5–5.3)
PROT SERPL-MCNC: 7.4 G/DL (ref 6.4–8.4)
PTH-INTACT SERPL-MCNC: 19 PG/ML (ref 12–88)
SODIUM SERPL-SCNC: 139 MMOL/L (ref 135–147)
TRIGL SERPL-MCNC: 205 MG/DL (ref ?–150)

## 2025-05-03 PROCEDURE — 80053 COMPREHEN METABOLIC PANEL: CPT

## 2025-05-03 PROCEDURE — 80061 LIPID PANEL: CPT

## 2025-05-03 PROCEDURE — 83970 ASSAY OF PARATHORMONE: CPT

## 2025-05-03 PROCEDURE — 83036 HEMOGLOBIN GLYCOSYLATED A1C: CPT

## 2025-05-04 ENCOUNTER — RESULTS FOLLOW-UP (OUTPATIENT)
Dept: FAMILY MEDICINE CLINIC | Facility: CLINIC | Age: 40
End: 2025-05-04

## 2025-05-06 DIAGNOSIS — E55.9 VITAMIN D DEFICIENCY: Primary | ICD-10-CM

## 2025-05-09 DIAGNOSIS — S82.202K CLOSED FRACTURE OF LEFT FIBULA AND TIBIA WITH NONUNION, SUBSEQUENT ENCOUNTER: Primary | ICD-10-CM

## 2025-05-09 DIAGNOSIS — S82.402K CLOSED FRACTURE OF LEFT FIBULA AND TIBIA WITH NONUNION, SUBSEQUENT ENCOUNTER: Primary | ICD-10-CM

## 2025-05-11 DIAGNOSIS — E03.9 HYPOTHYROIDISM, UNSPECIFIED TYPE: ICD-10-CM

## 2025-05-12 RX ORDER — LEVOTHYROXINE SODIUM 175 UG/1
175 TABLET ORAL DAILY
Qty: 30 TABLET | Refills: 1 | Status: SHIPPED | OUTPATIENT
Start: 2025-05-12

## 2025-05-13 ENCOUNTER — OFFICE VISIT (OUTPATIENT)
Dept: OBGYN CLINIC | Facility: CLINIC | Age: 40
End: 2025-05-13
Payer: OTHER MISCELLANEOUS

## 2025-05-13 ENCOUNTER — TELEPHONE (OUTPATIENT)
Dept: OBGYN CLINIC | Facility: CLINIC | Age: 40
End: 2025-05-13

## 2025-05-13 ENCOUNTER — APPOINTMENT (OUTPATIENT)
Dept: RADIOLOGY | Facility: CLINIC | Age: 40
End: 2025-05-13
Attending: ORTHOPAEDIC SURGERY
Payer: COMMERCIAL

## 2025-05-13 VITALS — HEIGHT: 75 IN | BODY MASS INDEX: 24.82 KG/M2 | WEIGHT: 199.6 LBS

## 2025-05-13 DIAGNOSIS — S82.202K CLOSED FRACTURE OF LEFT FIBULA AND TIBIA WITH NONUNION, SUBSEQUENT ENCOUNTER: ICD-10-CM

## 2025-05-13 DIAGNOSIS — S82.402K CLOSED FRACTURE OF LEFT FIBULA AND TIBIA WITH NONUNION, SUBSEQUENT ENCOUNTER: Primary | ICD-10-CM

## 2025-05-13 DIAGNOSIS — S82.202K CLOSED FRACTURE OF LEFT FIBULA AND TIBIA WITH NONUNION, SUBSEQUENT ENCOUNTER: Primary | ICD-10-CM

## 2025-05-13 DIAGNOSIS — S82.402K CLOSED FRACTURE OF LEFT FIBULA AND TIBIA WITH NONUNION, SUBSEQUENT ENCOUNTER: ICD-10-CM

## 2025-05-13 PROCEDURE — 99213 OFFICE O/P EST LOW 20 MIN: CPT | Performed by: ORTHOPAEDIC SURGERY

## 2025-05-13 PROCEDURE — 73590 X-RAY EXAM OF LOWER LEG: CPT

## 2025-05-13 NOTE — ASSESSMENT & PLAN NOTE
X-rays obtained and reviewed  Continue calcium vitamin D supplementation and follow up regarding thyroid labs with PCP  Return to work on 6/2/2025 with no restrictions  Continue physical therapy as directed  OTC analgesics as needed  Begin bone stimulator when it arrives  Follow up 6 weeks

## 2025-05-13 NOTE — TELEPHONE ENCOUNTER
Caller: IGLESIA     Doctor: Dr. Champagne / Arun    Reason for call: Please fax PT orders from 4/15   To below:     IGLESIA / Auth Dep't   Fax # [phone]

## 2025-05-13 NOTE — PROGRESS NOTES
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 male who presents 3 months s/p IM nail fixation right tibial shaft fracture, ORIF posterior malleolus DOS 2025. Today, the patient reports he is doing well overall today. He admits minimal pain in his lower leg and ankle. He continues physical therapy twice per week. He is not taking any oral analgesics. He has not received bone stimulator yet.  He has been working on light and seated duty. He has started calcium and vitamin D supplementation.     PAST MEDICAL HISTORY:  Past Medical History:   Diagnosis Date    Amphetamine abuse (HCC) 2018    Anxiety     Bipolar disorder (HCC)     Depression     Disease of thyroid gland     Drug abuse (HCC)     ETOH abuse     Hallucination     Hyperlipidemia     Psychiatric illness     Psychosis ([facility])     Schizoaffective disorder, bipolar type (HCC) 2018    Schizophrenia ([facility])        PAST SURGICAL HISTORY:  Past Surgical History:   Procedure Laterality Date    APPENDECTOMY LAPAROSCOPIC N/A 2022    Procedure: APPENDECTOMY LAPAROSCOPIC, possible open;  Surgeon: Declan Hill DO;  [facility];  Service: General     [place] TIBL SHFT FX IMED IMPLT W/WO SCREWS&/CERCLA Left 2/7/2025    Procedure: INSERTION NAIL IM LEFT TIBIA, ORIF OF POSTERIOR MALEOLUS;  Surgeon: Prateek Champagne MD;  Location: [facility];  Service: Orthopedics       FAMILY HISTORY:  History reviewed. No pertinent family history.    SOCIAL HISTORY:  Social History     Tobacco Use    Smoking status: Every Day     Current packs/day: 0.50     Average packs/day: 0.5 packs/day for 25.0 years (12.5 ttl pk-yrs)     Types: Cigarettes    Smokeless tobacco: Current   Vaping Use    Vaping status: Former   Substance Use Topics    Alcohol use: Not Currently     Alcohol/week: 15.0 standard drinks of alcohol     Types: 15 Shots of liquor per week     Comment: 5 to 6 shots in a sitting    Drug use: Not Currently     Types: Marijuana, Methamphetamines     Comment: smoked at 12 last night last used 5 months ago (snort or smoked)       MEDICATIONS:    Current Outpatient Medications:     albuterol (ProAir HFA) 90 mcg/act inhaler, Inhale 2 puffs every 6 (six) hours as needed for wheezing, Disp: 8.5 g, Rfl: 0    benztropine (COGENTIN) 1 mg tablet, Take 1 tablet (1 mg total) by mouth 2 (two) times a day, Disp: 60 tablet, Rfl: 0    cholecalciferol (VITAMIN D3) 250 MCG (22872 UT) capsule, Take 1 capsule (10,000 Units total) by mouth daily, Disp: 90 capsule, Rfl: 3    levothyroxine 175 mcg tablet, take 1 tablet by mouth once daily, Disp: 30 tablet, Rfl: 1    loxapine (LOXITANE) 10 mg capsule, 10 mg 2 (two) times a day, Disp: , Rfl:     methocarbamol (ROBAXIN) 500 mg tablet, Take 1 tablet (500 mg total) by mouth 4 (four) times a day, Disp: 20 tablet, Rfl: 0    OXcarbazepine (TRILEPTAL) 600 mg tablet, Take 600 mg by mouth every 12 (twelve) hours  , Disp: , Rfl:     risperiDONE (RisperDAL) 4 mg tablet, Take 4 mg by mouth 2 (two) times a day, Disp: , Rfl:     aspirin 325 mg tablet, Take 1 tablet (325 mg total) by mouth daily Take with food (Patient not taking: Reported on 5/13/2025), Disp: 45  "tablet, Rfl: 0    docusate sodium (COLACE) 100 mg capsule, Take 1 capsule (100 mg total) by mouth 2 (two) times a day for 10 days Take for as long as taking opioids. (Patient not taking: Reported on 5/13/2025), Disp: 20 capsule, Rfl: 0    ALLERGIES:  No Known Allergies    LABS:  HgA1c:   Lab Results   Component Value Date    HGBA1C 5.5 05/03/2025     BMP:   Lab Results   Component Value Date    CALCIUM 9.8 05/03/2025     06/22/2018    K 4.0 05/03/2025    CO2 30 05/03/2025     05/03/2025    BUN 13 05/03/2025    CREATININE 1.03 05/03/2025     CBC: No components found for: \"CBC\"    _____________________________________________________  PHYSICAL EXAMINATION:  Vital signs: Ht 6' 3\" (1.905 m)   Wt 90.5 kg (199 lb 9.6 oz)   BMI 24.95 kg/m²   General: No acute distress, awake and alert  Psychiatric: Mood and affect appear appropriate  HEENT: Trachea Midline, No torticollis, no apparent facial trauma  Cardiovascular: No audible murmurs; Extremities appear perfused  Pulmonary: No audible wheezing or stridor  Skin: No open lesions; see further details (if any) below    MUSCULOSKELETAL EXAMINATION:  Extremities:    Left leg  Incisions well healed  Active ankle range of motion: well maintained  Knee motion 0 to 120 degrees  There is normal range of motion of toes in plantar flexion and dorsiflexion.   There is no swelling and ecchymosis present over the ankle or lower leg.   Sensation is intact to light touch superficial peroneal, deep peroneal, tibial, saphenous, and sural nerve distributions.    2+ DP pulse present.        _____________________________________________________  STUDIES REVIEWED:  I personally reviewed the images obtained in office today and my independent interpretation is as follows:  X-rays of the left tibia obtained 5/13/2025 demonstrate orthopedic hardware intact without evidence of loosening or failure. No acute fracture.      PROCEDURES PERFORMED:  Procedures      Scribe Attestation      I,: "  Elisabet Hernandez am acting as a scribe while in the presence of the attending physician.:       I,:  Prateek Champagne MD personally performed the services described in this documentation    as scribed in my presence.:

## 2025-05-13 NOTE — LETTER
May 13, 2025     Patient: Rivas Chauhan  YOB: 1985  Date of Visit: 5/13/2025      To Whom it May Concern:    Rivas Cahuhan is under my professional care. Rivas was seen in my office on 5/13/2025. Rivas may return to full, unrestricted duty on 6/2/2025.    If you have any questions or concerns, please don't hesitate to call.         Sincerely,          Prateek Champagne MD        CC: No Recipients

## 2025-06-16 DIAGNOSIS — E55.9 VITAMIN D DEFICIENCY: ICD-10-CM

## 2025-06-16 DIAGNOSIS — E03.9 HYPOTHYROIDISM, UNSPECIFIED TYPE: ICD-10-CM

## 2025-06-16 NOTE — TELEPHONE ENCOUNTER
Not a duplicate- change in pharmacy     Reason for call:   [ ] Refill   [ ] Prior Auth  [ ] Other:     Office:   [ ] PCP/Provider - Landon Pacheco - [facility]     Medication:   levothyroxine 175 mcg tablet        Dose/Frequency: take 1 tablet by mouth once daily    Quantity: 30    Medication:   cholecalciferol (VITAMIN D3) 250 MCG (40940 UT) capsule        Dose/Frequency: Take 1 capsule (10,000 Units total) by mouth daily    Quantity: 90    Pharmacy: [facility]     Local Pharmacy   Does the patient have enough for 3 days?   [ ] Yes   [ ] No - Send as HP to POD

## 2025-06-17 RX ORDER — LEVOTHYROXINE SODIUM 175 UG/1
175 TABLET ORAL DAILY
Qty: 30 TABLET | Refills: 5 | OUTPATIENT
Start: 2025-06-17

## 2025-06-20 DIAGNOSIS — S82.402K CLOSED FRACTURE OF LEFT FIBULA AND TIBIA WITH NONUNION, SUBSEQUENT ENCOUNTER: Primary | ICD-10-CM

## 2025-06-20 DIAGNOSIS — S82.202K CLOSED FRACTURE OF LEFT FIBULA AND TIBIA WITH NONUNION, SUBSEQUENT ENCOUNTER: Primary | ICD-10-CM

## 2025-06-24 ENCOUNTER — APPOINTMENT (OUTPATIENT)
Dept: RADIOLOGY | Facility: CLINIC | Age: 40
End: 2025-06-24
Attending: ORTHOPAEDIC SURGERY
Payer: COMMERCIAL

## 2025-06-24 ENCOUNTER — OFFICE VISIT (OUTPATIENT)
Dept: OBGYN CLINIC | Facility: CLINIC | Age: 40
End: 2025-06-24
Payer: OTHER MISCELLANEOUS

## 2025-06-24 VITALS — WEIGHT: 198.6 LBS | HEIGHT: 75 IN | BODY MASS INDEX: 24.69 KG/M2

## 2025-06-24 DIAGNOSIS — S82.202A CLOSED FRACTURE OF LEFT TIBIA AND FIBULA, INITIAL ENCOUNTER: Primary | ICD-10-CM

## 2025-06-24 DIAGNOSIS — S82.402K CLOSED FRACTURE OF LEFT FIBULA AND TIBIA WITH NONUNION, SUBSEQUENT ENCOUNTER: ICD-10-CM

## 2025-06-24 DIAGNOSIS — S82.202K CLOSED FRACTURE OF LEFT FIBULA AND TIBIA WITH NONUNION, SUBSEQUENT ENCOUNTER: ICD-10-CM

## 2025-06-24 DIAGNOSIS — S82.402A CLOSED FRACTURE OF LEFT TIBIA AND FIBULA, INITIAL ENCOUNTER: Primary | ICD-10-CM

## 2025-06-24 PROCEDURE — 99213 OFFICE O/P EST LOW 20 MIN: CPT | Performed by: ORTHOPAEDIC SURGERY

## 2025-06-24 PROCEDURE — 73590 X-RAY EXAM OF LOWER LEG: CPT

## 2025-06-24 NOTE — PROGRESS NOTES
Name: Rivas Chauhan      : 1985       MRN: [id number]   Encounter Provider: Prateek Champagne MD   Encounter Date: 25  Encounter department: [facility] [place]         Assessment & Plan  Closed fracture of left tibia and fibula, initial encounter  4.5 months s/p IM nail fixation right tibial shaft fracture, ORIF posterior malleolus DOS 2025       WC   + Tobacco use   Recent bone stimulator use   Weight bearing as tolerated left lower extremity   Continue to use bone stimulator device as directed  Continue physical therapy  Patient will continue to work full duty status at this time  Over the counter analgesics as needed / directed   Ice / heat as directed   Follow up 1 month with repeat XR             To Do Next Visit:  XR left tibia / fibula   ____________________________________________________  CHIEF COMPLAINT:  Chief Complaint   Patient presents with    Left Leg - Follow-up         SUBJECTIVE:  Rivas Chauhan is a 39 y.o. male who presents  4.5 months s/p IM nail fixation right tibial shaft fracture, ORIF posterior malleolus DOS 2025.  Patient states he has minimal pain in the leg currently.  Patient states he does have mild pain over the fracture site and in his knee associated with prolonged weightbearing.  Patient denies any numbness or tingling in the leg.  Patient has been working full duty but does have to take intermittent breaks.  Patient denies any new or worsening symptoms.  Patient has not been taking any type pain medication.  Patient has been utilizing a bone stimulator for the left leg.  Patient has been attending physical therapy with good results.  Patient offers no other complaints at this time.      PAST MEDICAL HISTORY:  Past Medical History[1]    PAST SURGICAL HISTORY:  Past Surgical History[2]    FAMILY HISTORY:  Family History[3]    SOCIAL HISTORY:  Social History[4]    MEDICATIONS:  Current  "Medications[5]    ALLERGIES:  Allergies[6]    LABS:  HgA1c:   Lab Results   Component Value Date    HGBA1C 5.5 05/03/2025     BMP:   Lab Results   Component Value Date    CALCIUM 9.8 05/03/2025     06/22/2018    K 4.0 05/03/2025    CO2 30 05/03/2025     05/03/2025    BUN 13 05/03/2025    CREATININE 1.03 05/03/2025     CBC: No components found for: \"CBC\"    _____________________________________________________  PHYSICAL EXAMINATION:  Vital signs: Ht 6' 3\" (1.905 m)   Wt 90.1 kg (198 lb 9.6 oz)   BMI 24.82 kg/m²   General: No acute distress, awake and alert  Psychiatric: Mood and affect appear appropriate  HEENT: Trachea Midline, No torticollis, no apparent facial trauma  Cardiovascular: No audible murmurs; Extremities appear perfused  Pulmonary: No audible wheezing or stridor  Skin: No open lesions; see further details (if any) below      MUSCULOSKELETAL EXAMINATION:  Left lower extremity examination :  Patient sitting comfortably in the office in no apparent distress   Healed incision sites noted throughout the left lower extremity  Mild tenderness palpation noted over the medial aspect of the knee.  No other bony or soft tissue tenderness to palpation noted at this time.  Full range of motion of the knee and ankle present with no pain  NV intact      _____________________________________________________  STUDIES REVIEWED:  I personally reviewed the images obtained in office today and my independent interpretation is as follows:  Left tibia/fibula x-ray 2 views each:  Healing distal tibia fracture with maintained alignment in comparison to previous radiograph.  Increased callus ration present.   Hardware intact           PROCEDURES PERFORMED:  No procedures were performed at this time.               Pierre Vivar PA-C - assisting  Prateek Champagne MD             [1]   Past Medical History:  Diagnosis Date    Amphetamine abuse (HCC) 06/24/2018    Anxiety     Bipolar disorder (HCC)     Depression  "    Disease of thyroid gland     Drug abuse (HCC)     ETOH abuse     Hallucination     Hyperlipidemia     Psychiatric illness     Psychosis (HCC)     Schizoaffective disorder, bipolar type (HCC) 06/30/2018    Schizophrenia (HCC)    [2]   Past Surgical History:  Procedure Laterality Date    APPENDECTOMY LAPAROSCOPIC N/A 6/29/2022    Procedure: APPENDECTOMY LAPAROSCOPIC, possible open;  Surgeon: Declan Hill DO;  [facility]: [facility];  Service: General    [place] TIBL SHFT FX IMED IMPLT W/WO SCREWS&/CERCLA Left 2/7/2025    Procedure: INSERTION NAIL IM LEFT TIBIA, ORIF OF POSTERIOR MALEOLUS;  Surgeon: Prateek Champagne MD;  Location: [facility];  Service: Orthopedics   [3] No family history on file.  [4]   Social History  Tobacco Use    Smoking status: Every Day     Current packs/day: 0.50     Average packs/day: 0.5 packs/day for 25.0 years (12.5 ttl pk-yrs)     Types: Cigarettes    Smokeless tobacco: Current   Vaping Use    Vaping status: Former   Substance Use Topics    Alcohol use: Not Currently     Alcohol/week: 15.0 standard drinks of alcohol     Types: 15 Shots of liquor per week     Comment: 5 to 6 shots in a sitting    Drug use: Not Currently     Types: Marijuana, Methamphetamines     Comment: smoked at 12 last night last used 5 months ago (snort or smoked)   [5]   Current Outpatient Medications:     benztropine (COGENTIN) 1 mg tablet, Take 1 tablet (1 mg total) by mouth 2 (two) times a day, Disp: 60 tablet, Rfl: 0    cholecalciferol (VITAMIN D3) 250 MCG (27981 UT) capsule, Take 1 capsule (10,000 Units total) by mouth daily, Disp: 90 capsule, Rfl: 3    levothyroxine 175 mcg tablet, take 1 tablet by mouth once daily, Disp: 30 tablet, Rfl: 1    loxapine (LOXITANE) 10 mg capsule, 10 mg in the morning and 10 mg in the evening., Disp: , Rfl:     methocarbamol (ROBAXIN) 500 mg tablet, Take 1 tablet (500 mg total) by mouth 4 (four) times a day, Disp: 20 tablet, Rfl: 0    OXcarbazepine (TRILEPTAL) 600 mg tablet, Take  600 mg by mouth every 12 (twelve) hours, Disp: , Rfl:     risperiDONE (RisperDAL) 4 mg tablet, Take 4 mg by mouth in the morning and 4 mg in the evening., Disp: , Rfl:     albuterol (ProAir HFA) 90 mcg/act inhaler, Inhale 2 puffs every 6 (six) hours as needed for wheezing, Disp: 8.5 g, Rfl: 0    aspirin 325 mg tablet, Take 1 tablet (325 mg total) by mouth daily Take with food (Patient not taking: Reported on 5/13/2025), Disp: 45 tablet, Rfl: 0    docusate sodium (COLACE) 100 mg capsule, Take 1 capsule (100 mg total) by mouth 2 (two) times a day for 10 days Take for as long as taking opioids. (Patient not taking: Reported on 5/13/2025), Disp: 20 capsule, Rfl: 0  [6] No Known Allergies

## 2025-06-24 NOTE — LETTER
June 24, 2025     Patient: Rivas Chauhan  YOB: 1985  Date of Visit: 6/24/2025      To Whom it May Concern:    Rivas Chauhan is under my professional care. Rivas was seen in my office on 6/24/2025.  Patient may continue to work full duty status at this time    If you have any questions or concerns, please don't hesitate to call.              Sincerely,       Prateek Champagne MD        CC: No Recipients

## 2025-06-24 NOTE — PATIENT INSTRUCTIONS
Weight bearing as tolerated left lower extremity   Continue to use bone stimulator device as directed  Continue physical therapy  Patient will continue to work full duty status at this time  Over the counter analgesics as needed / directed   Ice / heat as directed   Follow up 1 month with repeat XR

## 2025-06-24 NOTE — ASSESSMENT & PLAN NOTE
4.5 months s/p IM nail fixation right tibial shaft fracture, ORIF posterior malleolus DOS 2/7/2025       WC   + Tobacco use   Recent bone stimulator use   Weight bearing as tolerated left lower extremity   Continue to use bone stimulator device as directed  Continue physical therapy  Patient will continue to work full duty status at this time  Over the counter analgesics as needed / directed   Ice / heat as directed   Follow up 1 month with repeat XR

## 2025-06-25 DIAGNOSIS — E03.9 HYPOTHYROIDISM, UNSPECIFIED TYPE: ICD-10-CM

## 2025-06-25 RX ORDER — LEVOTHYROXINE SODIUM 175 UG/1
175 TABLET ORAL DAILY
Qty: 30 TABLET | Refills: 1 | Status: SHIPPED | OUTPATIENT
Start: 2025-06-25

## 2025-06-25 NOTE — TELEPHONE ENCOUNTER
PHARMACY CHANGE !       Reason for call:   [x] Refill   [] Prior Auth  [] Other:     Office:   [x] PCP/Provider -   [] Specialty/Provider -     Medication: levothyroxine 175 mcg tablet-  take 1 tablet by mouth once daily,       Pharmacy: [facility] [place] [place]     Local Pharmacy   Does the patient have enough for 3 days?   [] Yes   [x] No - Send as HP to POD    Mail Away Pharmacy   Does the patient have enough for 10 days?   [] Yes   [] No - Send as HP to POD

## 2025-07-21 DIAGNOSIS — S82.402K CLOSED FRACTURE OF LEFT FIBULA AND TIBIA WITH NONUNION, SUBSEQUENT ENCOUNTER: Primary | ICD-10-CM

## 2025-07-21 DIAGNOSIS — S82.202K CLOSED FRACTURE OF LEFT FIBULA AND TIBIA WITH NONUNION, SUBSEQUENT ENCOUNTER: Primary | ICD-10-CM

## 2025-07-24 ENCOUNTER — APPOINTMENT (OUTPATIENT)
Dept: RADIOLOGY | Facility: CLINIC | Age: 40
End: 2025-07-24
Attending: ORTHOPAEDIC SURGERY
Payer: COMMERCIAL

## 2025-07-24 VITALS — BODY MASS INDEX: 24.94 KG/M2 | HEIGHT: 75 IN | WEIGHT: 200.6 LBS

## 2025-07-24 DIAGNOSIS — S82.402K CLOSED FRACTURE OF LEFT FIBULA AND TIBIA WITH NONUNION, SUBSEQUENT ENCOUNTER: ICD-10-CM

## 2025-07-24 DIAGNOSIS — S82.202K CLOSED FRACTURE OF LEFT FIBULA AND TIBIA WITH NONUNION, SUBSEQUENT ENCOUNTER: ICD-10-CM

## 2025-07-24 DIAGNOSIS — S82.202K CLOSED FRACTURE OF LEFT FIBULA AND TIBIA WITH NONUNION, SUBSEQUENT ENCOUNTER: Primary | ICD-10-CM

## 2025-07-24 DIAGNOSIS — S82.402K CLOSED FRACTURE OF LEFT FIBULA AND TIBIA WITH NONUNION, SUBSEQUENT ENCOUNTER: Primary | ICD-10-CM

## 2025-07-24 PROCEDURE — 73590 X-RAY EXAM OF LOWER LEG: CPT

## 2025-07-24 PROCEDURE — 99213 OFFICE O/P EST LOW 20 MIN: CPT | Performed by: ORTHOPAEDIC SURGERY

## 2025-07-24 NOTE — PROGRESS NOTES
Name: Rivas Chauhan      : 1985       MRN: [id number]   Encounter Provider: Prateek Champagne MD   Encounter Date: 25  Encounter department: 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, subsequent encounter  5.5 months s/p IM nail fixation right tibial shaft fracture, ORIF posterior malleolus DOS 2025       WC   + Tobacco use   Recent bone stimulator use , significantly increased callus with bone stimulator  Xrays obtained and reviewed  May discontinue bone stimulator  Continue WB and activity as tolerated  Okay to finish physical therapy and continue HEP as directed  Continue work at full duty  Follow up 6 months            To Do Next Visit:  Xray left tibia/fibula    _____________________________________________________  CHIEF COMPLAINT:  Chief Complaint   Patient presents with    Left Leg - Follow-up         SUBJECTIVE:  Rivas Chauhan is a 39 y.o. male who presents 5.5 months s/p IM nail fixation right tibial shaft fracture, ORIF posterior malleolus DOS 2025. Today, the patient reports he is doing well overall. He is no longer having pain in his lower leg. He admits mild discomfort in his knee sometimes, but not consistently. He continues to use his bone stimulator three times per day. The patient has been working full duty at work. He has been continuing physical therapy and is planning for discharge on Monday. The patient offers no other complaints at this time.     PAST MEDICAL HISTORY:  Past Medical History[1]    PAST SURGICAL HISTORY:  Past Surgical History[2]    FAMILY HISTORY:  Family History[3]    SOCIAL HISTORY:  Social History[4]    MEDICATIONS:  Current Medications[5]    ALLERGIES:  Allergies[6]    LABS:  HgA1c:   Lab Results   Component Value Date    HGBA1C 5.5 2025     BMP:   Lab Results   Component Value Date    CALCIUM 9.8 2025     2018    K 4.0  "05/03/2025    CO2 30 05/03/2025     05/03/2025    BUN 13 05/03/2025    CREATININE 1.03 05/03/2025     CBC: No components found for: \"CBC\"    _____________________________________________________  PHYSICAL EXAMINATION:  Vital signs: Ht 6' 3\" (1.905 m)   Wt 91 kg (200 lb 9.6 oz)   BMI 25.07 kg/m²   General: No acute distress, awake and alert  Psychiatric: Mood and affect appear appropriate  HEENT: Trachea Midline, No torticollis, no apparent facial trauma  Cardiovascular: No audible murmurs; Extremities appear perfused  Pulmonary: No audible wheezing or stridor  Skin: No open lesions; see further details (if any) below    MUSCULOSKELETAL EXAMINATION:  Extremities:    Left leg  Range of motion from 0 to 120.    Full ankle motion  There is no effusion.    There is no tenderness over the knee, ankle, lower leg.    The patient is able to perform a straight leg raise.    Varus stress testing reveals no instability at 0 and 30 degrees   Valgus stress testing reveals no instability at 0 and 30 degrees  The patient is neurovascular intact distally.        _____________________________________________________  STUDIES REVIEWED:  I personally reviewed the images obtained in office today and my independent interpretation is as follows:  Xrays of the left tibia/fibula obtained 7/24/2025 demonstrate orthopedic hardware intact without evidence of loosening or failure. Significant callus at all fracture sites      PROCEDURES PERFORMED:  Procedures  None performed.      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    Anxiety     Bipolar disorder (HCC)     Depression     Disease of thyroid gland     Drug abuse (HCC)     ETOH abuse "     Hallucination     Hyperlipidemia     Psychiatric illness     Psychosis (HCC)     Schizoaffective disorder, bipolar type (HCC) 06/30/2018    Schizophrenia (HCC)    [2]   Past Surgical History:  Procedure Laterality Date    APPENDECTOMY LAPAROSCOPIC N/A 6/29/2022    Procedure: APPENDECTOMY LAPAROSCOPIC, possible open;  Surgeon: Declan Hill DO;  Location: [facility];  Service: General    MD TX TIBL SHFT FX IMED IMPLT W/WO SCREWS&/CERCLA Left 2/7/2025    Procedure: INSERTION NAIL IM LEFT TIBIA, ORIF OF POSTERIOR MALEOLUS;  Surgeon: Prateek Champagne MD;  Location: [facility];  Service: Orthopedics   [3] No family history on file.  [4]   Social History  Tobacco Use    Smoking status: Every Day     Current packs/day: 0.50     Average packs/day: 0.5 packs/day for 25.0 years (12.5 ttl pk-yrs)     Types: Cigarettes    Smokeless tobacco: Current   Vaping Use    Vaping status: Former   Substance Use Topics    Alcohol use: Not Currently     Alcohol/week: 15.0 standard drinks of alcohol     Types: 15 Shots of liquor per week     Comment: 5 to 6 shots in a sitting    Drug use: Not Currently     Types: Marijuana, Methamphetamines     Comment: smoked at 13 last night last used 5 months ago (snort or smoked)   [5]   Current Outpatient Medications:     benztropine (COGENTIN) 1 mg tablet, Take 1 tablet (1 mg total) by mouth 2 (two) times a day, Disp: 60 tablet, Rfl: 0    cholecalciferol (VITAMIN D3) 250 MCG (86197 UT) capsule, Take 1 capsule (10,000 Units total) by mouth daily, Disp: 90 capsule, Rfl: 3    levothyroxine 175 mcg tablet, Take 1 tablet (175 mcg total) by mouth daily, Disp: 30 tablet, Rfl: 1    loxapine (LOXITANE) 10 mg capsule, 10 mg in the morning and 10 mg in the evening., Disp: , Rfl:     methocarbamol (ROBAXIN) 500 mg tablet, Take 1 tablet (500 mg total) by mouth 4 (four) times a day, Disp: 20 tablet, Rfl: 0    OXcarbazepine (TRILEPTAL) 600 mg tablet, Take 600 mg by mouth every 12 (twelve) hours, Disp: , Rfl:      risperiDONE (RisperDAL) 4 mg tablet, Take 4 mg by mouth in the morning and 4 mg in the evening., Disp: , Rfl:     albuterol (ProAir HFA) 90 mcg/act inhaler, Inhale 2 puffs every 6 (six) hours as needed for wheezing, Disp: 8.5 g, Rfl: 0    aspirin 325 mg tablet, Take 1 tablet (325 mg total) by mouth daily Take with food (Patient not taking: Reported on 5/13/2025), Disp: 45 tablet, Rfl: 0    docusate sodium (COLACE) 100 mg capsule, Take 1 capsule (100 mg total) by mouth 2 (two) times a day for 10 days Take for as long as taking opioids. (Patient not taking: Reported on 5/13/2025), Disp: 20 capsule, Rfl: 0  [6] No Known Allergies

## 2025-07-24 NOTE — ASSESSMENT & PLAN NOTE
5.5 months s/p IM nail fixation right tibial shaft fracture, ORIF posterior malleolus DOS 2/7/2025       WC   + Tobacco use   Recent bone stimulator use , significantly increased callus with bone stimulator  Xrays obtained and reviewed  May discontinue bone stimulator  Continue WB and activity as tolerated  Okay to finish physical therapy and continue HEP as directed  Continue work at full duty  Follow up 6 months

## 2025-07-24 NOTE — LETTER
July 24, 2025     Patient: Rivas Chauhan  YOB: 1985  Date of Visit: 7/24/2025      To Whom it May Concern:    Rivas Chauhan is under my professional care. Rivas was seen in my office on 7/24/2025. Rivas may continue work at full duty.     If you have any questions or concerns, please don't hesitate to call.         Sincerely,          Prateek Champagne MD        CC: No Recipients

## (undated) DEVICE — PAD GROUNDING DUAL ADULT

## (undated) DEVICE — SPONGE SCRUB 4 PCT CHLORHEXIDINE

## (undated) DEVICE — PLUMEPEN PRO 10FT

## (undated) DEVICE — KERLIX BANDAGE ROLL: Brand: KERLIX

## (undated) DEVICE — NEEDLE 25G X 1 1/2

## (undated) DEVICE — 3M™ IOBAN™ 2 ANTIMICROBIAL INCISE DRAPE 6650EZ: Brand: IOBAN™ 2

## (undated) DEVICE — PROXIMATE PLUS MD MULTI-DIRECTIONAL RELEASE SKIN STAPLERS CONTAINS 35 STAINLESS STEEL STAPLES APPROXIMATE CLOSED DIMENSIONS: 6.9MM X 3.9MM WIDE: Brand: PROXIMATE

## (undated) DEVICE — JP CHANNEL DRAIN, 19FR HUBLESS: Brand: CARDINAL HEALTH

## (undated) DEVICE — PADDING CAST 4 IN  COTTON STRL

## (undated) DEVICE — BANDAGE, ESMARK LF STR 6"X9' (20/CS): Brand: CYPRESS

## (undated) DEVICE — GLOVE SRG LF STRL BGL SKNSNS 7 PF

## (undated) DEVICE — GLOVE SRG BIOGEL ECLIPSE 7

## (undated) DEVICE — 4-PORT MANIFOLD: Brand: NEPTUNE 2

## (undated) DEVICE — 3.2MM GUIDE WIRE 400MM

## (undated) DEVICE — ACE WRAP 6 IN UNSTERILE

## (undated) DEVICE — SUT VICRYL PLUS 1 CT-1 27IN VCPP40D

## (undated) DEVICE — TROCARS: Brand: KII® BALLOON BLUNT TIP SYSTEM

## (undated) DEVICE — CHLORAPREP HI-LITE 26ML ORANGE

## (undated) DEVICE — 3.9/4.2MM THREE-FLT DRL BIT QC 331MM/100MM CALIB FOR 5.0 ASL

## (undated) DEVICE — TROCAR: Brand: KII FIOS FIRST ENTRY

## (undated) DEVICE — CURITY NON-ADHERENT STRIPS: Brand: CURITY

## (undated) DEVICE — TROCAR: Brand: KII SLEEVE

## (undated) DEVICE — 1.25MM NON-THREADED GUIDE WIRE 150MM
Type: IMPLANTABLE DEVICE | Site: LEG | Status: NON-FUNCTIONAL
Removed: 2025-02-07

## (undated) DEVICE — DRAPE C-ARMOUR

## (undated) DEVICE — BETHLEHEM UNIV MAJ EXT ,KIT: Brand: CARDINAL HEALTH

## (undated) DEVICE — LAPAROSCOPY PACK: Brand: MEDLINE INDUSTRIES, INC.

## (undated) DEVICE — 3M™ TEGADERM™ TRANSPARENT FILM DRESSING FRAME STYLE, 1624W, 2-3/8 IN X 2-3/4 IN (6 CM X 7 CM), 100/CT 4CT/CASE: Brand: 3M™ TEGADERM™

## (undated) DEVICE — GAUZE SPONGES,8 PLY: Brand: CURITY

## (undated) DEVICE — C-ARM: Brand: UNBRANDED

## (undated) DEVICE — ENDOPATH ETS-FLEX45 ARTICULATING ENDOSCOPIC LINEAR CUTTER, NO RELOAD: Brand: ENDOPATH

## (undated) DEVICE — SUT ETHILON 2-0 PS 18 IN 585H

## (undated) DEVICE — IMPERVIOUS STOCKINETTE: Brand: DEROYAL

## (undated) DEVICE — ENDOPATH 5MM CURVED SCISSORS WITH MONOPOLAR CAUTERY: Brand: ENDOPATH

## (undated) DEVICE — 4.2MM THREE-FLUTED DRILL BIT QC/NEEDLE POINT/145MM

## (undated) DEVICE — ETS45 RELOAD STANDARD 45MM: Brand: ENDOPATH

## (undated) DEVICE — GLOVE INDICATOR PI UNDERGLOVE SZ 8 BLUE

## (undated) DEVICE — BULB SYRINGE, IRRIGATION WITH PROTECTIVE CAP, 60 CC, INDIVIDUALLY WRAPPED: Brand: DOVER

## (undated) DEVICE — JACKSON-PRATT 100CC BULB RESERVOIR: Brand: CARDINAL HEALTH

## (undated) DEVICE — SUT VICRYL PLUS 2-0 CTB-1 27 IN VCPB259H

## (undated) DEVICE — 2.7MM CANNULATED DRILL BIT/QC 160MM

## (undated) DEVICE — GAUZE SPONGES,16 PLY: Brand: CURITY

## (undated) DEVICE — 12.0MM OUTER PROTECTION SLEEVE FOR SUPRAPATELLAR - STERILE

## (undated) DEVICE — ENDOPOUCH RETRIEVER SPECIMEN RETRIEVAL BAGS: Brand: ENDOPOUCH RETRIEVER

## (undated) DEVICE — SUT VICRYL 0 UR-6 27 IN J603H

## (undated) DEVICE — ABDOMINAL PAD: Brand: DERMACEA

## (undated) DEVICE — INTENDED FOR TISSUE SEPARATION, AND OTHER PROCEDURES THAT REQUIRE A SHARP SURGICAL BLADE TO PUNCTURE OR CUT.: Brand: BARD-PARKER SAFETY BLADES SIZE 10, STERILE

## (undated) DEVICE — DRESSING MEPILEX AG BORDER POST-OP 4 X 8 IN

## (undated) DEVICE — DRESSING MEPILEX AG BORDER 4 X 4 IN

## (undated) DEVICE — INTENDED FOR TISSUE SEPARATION, AND OTHER PROCEDURES THAT REQUIRE A SHARP SURGICAL BLADE TO PUNCTURE OR CUT.: Brand: BARD-PARKER SAFETY BLADES SIZE 15, STERILE

## (undated) DEVICE — MEDI-VAC YANK SUCT HNDL W/TPRD BULBOUS TIP: Brand: CARDINAL HEALTH

## (undated) DEVICE — SUT MONOCRYL 4-0 PS-2 27 IN Y426H

## (undated) DEVICE — GLOVE SRG BIOGEL ORTHOPEDIC 8